# Patient Record
Sex: FEMALE | Race: WHITE | Employment: OTHER | URBAN - NONMETROPOLITAN AREA
[De-identification: names, ages, dates, MRNs, and addresses within clinical notes are randomized per-mention and may not be internally consistent; named-entity substitution may affect disease eponyms.]

---

## 2017-03-13 ENCOUNTER — OFFICE VISIT (OUTPATIENT)
Age: 82
End: 2017-03-13

## 2017-03-13 VITALS
HEIGHT: 63 IN | WEIGHT: 180 LBS | SYSTOLIC BLOOD PRESSURE: 130 MMHG | RESPIRATION RATE: 16 BRPM | HEART RATE: 87 BPM | OXYGEN SATURATION: 98 % | TEMPERATURE: 97.6 F | BODY MASS INDEX: 31.89 KG/M2 | DIASTOLIC BLOOD PRESSURE: 70 MMHG

## 2017-03-13 DIAGNOSIS — Z98.890 S/P LUMPECTOMY, LEFT BREAST: ICD-10-CM

## 2017-03-13 DIAGNOSIS — C50.212 CARCINOMA OF LEFT BREAST UPPER INNER QUADRANT (HCC): Primary | ICD-10-CM

## 2017-03-13 PROCEDURE — G8417 CALC BMI ABV UP PARAM F/U: HCPCS | Performed by: SURGERY

## 2017-03-13 PROCEDURE — G8427 DOCREV CUR MEDS BY ELIG CLIN: HCPCS | Performed by: SURGERY

## 2017-03-13 PROCEDURE — G8400 PT W/DXA NO RESULTS DOC: HCPCS | Performed by: SURGERY

## 2017-03-13 PROCEDURE — 1090F PRES/ABSN URINE INCON ASSESS: CPT | Performed by: SURGERY

## 2017-03-13 PROCEDURE — 4040F PNEUMOC VAC/ADMIN/RCVD: CPT | Performed by: SURGERY

## 2017-03-13 PROCEDURE — 1036F TOBACCO NON-USER: CPT | Performed by: SURGERY

## 2017-03-13 PROCEDURE — G8484 FLU IMMUNIZE NO ADMIN: HCPCS | Performed by: SURGERY

## 2017-03-13 PROCEDURE — 1123F ACP DISCUSS/DSCN MKR DOCD: CPT | Performed by: SURGERY

## 2017-03-13 PROCEDURE — 99212 OFFICE O/P EST SF 10 MIN: CPT | Performed by: SURGERY

## 2017-03-15 ENCOUNTER — OFFICE VISIT (OUTPATIENT)
Dept: ONCOLOGY | Age: 82
End: 2017-03-15

## 2017-03-15 VITALS
HEART RATE: 79 BPM | DIASTOLIC BLOOD PRESSURE: 75 MMHG | BODY MASS INDEX: 31.54 KG/M2 | SYSTOLIC BLOOD PRESSURE: 164 MMHG | OXYGEN SATURATION: 97 % | WEIGHT: 178 LBS | HEIGHT: 63 IN | TEMPERATURE: 97.1 F | RESPIRATION RATE: 18 BRPM

## 2017-03-15 DIAGNOSIS — C50.212 CARCINOMA OF LEFT BREAST UPPER INNER QUADRANT (HCC): Primary | ICD-10-CM

## 2017-03-15 DIAGNOSIS — Z98.890 S/P LUMPECTOMY, LEFT BREAST: ICD-10-CM

## 2017-03-15 DIAGNOSIS — Z92.3 STATUS POST RADIATION THERAPY WITHIN LAST FOUR WEEKS: ICD-10-CM

## 2017-03-15 PROCEDURE — G8417 CALC BMI ABV UP PARAM F/U: HCPCS | Performed by: INTERNAL MEDICINE

## 2017-03-15 PROCEDURE — 4040F PNEUMOC VAC/ADMIN/RCVD: CPT | Performed by: INTERNAL MEDICINE

## 2017-03-15 PROCEDURE — G8484 FLU IMMUNIZE NO ADMIN: HCPCS | Performed by: INTERNAL MEDICINE

## 2017-03-15 PROCEDURE — G8427 DOCREV CUR MEDS BY ELIG CLIN: HCPCS | Performed by: INTERNAL MEDICINE

## 2017-03-15 PROCEDURE — 99213 OFFICE O/P EST LOW 20 MIN: CPT | Performed by: INTERNAL MEDICINE

## 2017-03-15 PROCEDURE — 1123F ACP DISCUSS/DSCN MKR DOCD: CPT | Performed by: INTERNAL MEDICINE

## 2017-03-15 PROCEDURE — G8400 PT W/DXA NO RESULTS DOC: HCPCS | Performed by: INTERNAL MEDICINE

## 2017-03-15 PROCEDURE — 1036F TOBACCO NON-USER: CPT | Performed by: INTERNAL MEDICINE

## 2017-03-15 PROCEDURE — 1090F PRES/ABSN URINE INCON ASSESS: CPT | Performed by: INTERNAL MEDICINE

## 2017-03-15 ASSESSMENT — ENCOUNTER SYMPTOMS
BLOOD IN STOOL: 0
COUGH: 0
ABDOMINAL PAIN: 0
CONSTIPATION: 0
TROUBLE SWALLOWING: 0
DIARRHEA: 0
EYE REDNESS: 0
RECTAL PAIN: 0
CHEST TIGHTNESS: 0
COLOR CHANGE: 0
EYE ITCHING: 0
SORE THROAT: 0
SHORTNESS OF BREATH: 0
FACIAL SWELLING: 0
BACK PAIN: 0
ABDOMINAL DISTENTION: 0
VOICE CHANGE: 0
VOMITING: 0
WHEEZING: 0
EYE DISCHARGE: 0
NAUSEA: 0

## 2017-07-17 ENCOUNTER — OFFICE VISIT (OUTPATIENT)
Dept: ONCOLOGY | Age: 82
End: 2017-07-17
Payer: MEDICARE

## 2017-07-17 ENCOUNTER — APPOINTMENT (OUTPATIENT)
Dept: INFUSION THERAPY | Age: 82
End: 2017-07-17
Payer: MEDICARE

## 2017-07-17 VITALS
SYSTOLIC BLOOD PRESSURE: 166 MMHG | OXYGEN SATURATION: 96 % | WEIGHT: 179 LBS | TEMPERATURE: 97.2 F | BODY MASS INDEX: 31.71 KG/M2 | HEART RATE: 48 BPM | DIASTOLIC BLOOD PRESSURE: 68 MMHG | RESPIRATION RATE: 18 BRPM | HEIGHT: 63 IN

## 2017-07-17 DIAGNOSIS — Z12.31 ENCOUNTER FOR SCREENING MAMMOGRAM FOR MALIGNANT NEOPLASM OF BREAST: ICD-10-CM

## 2017-07-17 DIAGNOSIS — Z98.890 S/P LUMPECTOMY, LEFT BREAST: ICD-10-CM

## 2017-07-17 DIAGNOSIS — C50.212 CARCINOMA OF LEFT BREAST UPPER INNER QUADRANT (HCC): Primary | ICD-10-CM

## 2017-07-17 PROCEDURE — G8417 CALC BMI ABV UP PARAM F/U: HCPCS | Performed by: INTERNAL MEDICINE

## 2017-07-17 PROCEDURE — 4040F PNEUMOC VAC/ADMIN/RCVD: CPT | Performed by: INTERNAL MEDICINE

## 2017-07-17 PROCEDURE — 1036F TOBACCO NON-USER: CPT | Performed by: INTERNAL MEDICINE

## 2017-07-17 PROCEDURE — G8400 PT W/DXA NO RESULTS DOC: HCPCS | Performed by: INTERNAL MEDICINE

## 2017-07-17 PROCEDURE — 99213 OFFICE O/P EST LOW 20 MIN: CPT | Performed by: INTERNAL MEDICINE

## 2017-07-17 PROCEDURE — 99211 OFF/OP EST MAY X REQ PHY/QHP: CPT

## 2017-07-17 PROCEDURE — G8427 DOCREV CUR MEDS BY ELIG CLIN: HCPCS | Performed by: INTERNAL MEDICINE

## 2017-07-17 PROCEDURE — 1090F PRES/ABSN URINE INCON ASSESS: CPT | Performed by: INTERNAL MEDICINE

## 2017-07-17 PROCEDURE — 1123F ACP DISCUSS/DSCN MKR DOCD: CPT | Performed by: INTERNAL MEDICINE

## 2017-07-17 RX ORDER — TRAMADOL HYDROCHLORIDE 50 MG/1
50 TABLET ORAL EVERY 6 HOURS PRN
COMMUNITY
End: 2018-06-28 | Stop reason: ALTCHOICE

## 2017-07-17 ASSESSMENT — ENCOUNTER SYMPTOMS
ABDOMINAL DISTENTION: 0
BACK PAIN: 0
COUGH: 0
ABDOMINAL PAIN: 0
WHEEZING: 0
COLOR CHANGE: 0
EYE DISCHARGE: 0
RECTAL PAIN: 0
TROUBLE SWALLOWING: 0
EYE ITCHING: 0
SHORTNESS OF BREATH: 0
EYE REDNESS: 0
BLOOD IN STOOL: 0
SORE THROAT: 0
NAUSEA: 0
CHEST TIGHTNESS: 0
VOMITING: 0
VOICE CHANGE: 0
FACIAL SWELLING: 0
CONSTIPATION: 0
DIARRHEA: 0

## 2017-09-07 ENCOUNTER — HOSPITAL ENCOUNTER (OUTPATIENT)
Age: 82
Discharge: HOME | End: 2017-09-07
Payer: MEDICARE

## 2017-09-07 ENCOUNTER — HOSPITAL ENCOUNTER (OUTPATIENT)
Dept: RADIATION ONCOLOGY | Age: 82
Discharge: HOME | End: 2017-09-07
Payer: MEDICARE

## 2017-09-07 DIAGNOSIS — C50.212 CARCINOMA OF LEFT BREAST UPPER INNER QUADRANT (HCC): ICD-10-CM

## 2017-09-07 DIAGNOSIS — R53.81 PHYSICAL DECONDITIONING: ICD-10-CM

## 2017-09-07 DIAGNOSIS — I49.9 IRREGULAR HEART RATE: Primary | ICD-10-CM

## 2017-09-07 PROCEDURE — 99211 OFF/OP EST MAY X REQ PHY/QHP: CPT

## 2017-09-07 PROCEDURE — 93005 ELECTROCARDIOGRAM TRACING: CPT

## 2017-09-08 LAB
EKG ATRIAL RATE: 83 BPM
EKG P AXIS: 79 DEGREES
EKG P-R INTERVAL: 158 MS
EKG Q-T INTERVAL: 402 MS
EKG QRS DURATION: 130 MS
EKG QTC CALCULATION (BAZETT): 472 MS
EKG R AXIS: 35 DEGREES
EKG T AXIS: 72 DEGREES
EKG VENTRICULAR RATE: 83 BPM

## 2017-09-21 ENCOUNTER — HOSPITAL ENCOUNTER (OUTPATIENT)
Dept: PHYSICAL THERAPY | Age: 82
Setting detail: THERAPIES SERIES
Discharge: HOME | End: 2017-09-21
Payer: MEDICARE

## 2017-09-21 PROCEDURE — G8991 OTHER PT/OT GOAL STATUS: HCPCS

## 2017-09-21 PROCEDURE — G8990 OTHER PT/OT CURRENT STATUS: HCPCS

## 2017-09-21 ASSESSMENT — PAIN DESCRIPTION - DESCRIPTORS: DESCRIPTORS: ACHING

## 2017-09-21 ASSESSMENT — PAIN SCALES - GENERAL: PAINLEVEL_OUTOF10: 6

## 2017-09-21 ASSESSMENT — PAIN DESCRIPTION - LOCATION: LOCATION: SHOULDER

## 2017-09-21 ASSESSMENT — PAIN DESCRIPTION - ORIENTATION: ORIENTATION: LEFT;ANTERIOR

## 2017-09-28 ENCOUNTER — APPOINTMENT (OUTPATIENT)
Dept: PHYSICAL THERAPY | Age: 82
End: 2017-09-28
Payer: MEDICARE

## 2017-11-02 ENCOUNTER — HOSPITAL ENCOUNTER (OUTPATIENT)
Dept: PHYSICAL THERAPY | Age: 82
Setting detail: THERAPIES SERIES
Discharge: HOME | End: 2017-11-02
Payer: MEDICARE

## 2017-11-02 NOTE — PROGRESS NOTES
[address] [facility] DISCHARGE NOTE  OUTPATIENT  [address] [facility]    Patient Name: Geoffrey Arce        CSN: [id number]   YOB: 1934  Gender: female          Patient is discharged from Physical Therapy services at this time. See last note for details related to results of therapy and goal achievement. Reason for discharge:  She has only been seen for eval and chart has been on hold for over 30 days. Will discharge at this time.   New order needed to continue      Catalina Coker [id number]

## 2017-11-15 ENCOUNTER — HOSPITAL ENCOUNTER (OUTPATIENT)
Dept: WOMENS IMAGING | Age: 82
Discharge: HOME | End: 2017-11-15
Payer: MEDICARE

## 2017-11-15 DIAGNOSIS — C50.212 CARCINOMA OF LEFT BREAST UPPER INNER QUADRANT (HCC): ICD-10-CM

## 2017-11-15 DIAGNOSIS — Z98.890 S/P LUMPECTOMY, LEFT BREAST: ICD-10-CM

## 2017-11-15 PROCEDURE — G0279 TOMOSYNTHESIS, MAMMO: HCPCS

## 2017-11-30 ENCOUNTER — OFFICE VISIT (OUTPATIENT)
Dept: ONCOLOGY | Age: 82
End: 2017-11-30
Payer: MEDICARE

## 2017-11-30 ENCOUNTER — HOSPITAL ENCOUNTER (OUTPATIENT)
Dept: INFUSION THERAPY | Age: 82
Discharge: HOME | End: 2017-11-30
Payer: MEDICARE

## 2017-11-30 VITALS
HEART RATE: 76 BPM | SYSTOLIC BLOOD PRESSURE: 145 MMHG | DIASTOLIC BLOOD PRESSURE: 74 MMHG | HEIGHT: 63 IN | OXYGEN SATURATION: 96 % | RESPIRATION RATE: 16 BRPM | BODY MASS INDEX: 32.67 KG/M2 | TEMPERATURE: 97 F | WEIGHT: 184.4 LBS

## 2017-11-30 DIAGNOSIS — Z98.890 S/P LUMPECTOMY, LEFT BREAST: ICD-10-CM

## 2017-11-30 DIAGNOSIS — C50.212 CARCINOMA OF UPPER-INNER QUADRANT OF LEFT BREAST IN FEMALE, ESTROGEN RECEPTOR NEGATIVE (HCC): Primary | ICD-10-CM

## 2017-11-30 DIAGNOSIS — Z92.3 STATUS POST RADIATION THERAPY WITHIN LAST FOUR WEEKS: ICD-10-CM

## 2017-11-30 DIAGNOSIS — Z17.1 CARCINOMA OF UPPER-INNER QUADRANT OF LEFT BREAST IN FEMALE, ESTROGEN RECEPTOR NEGATIVE (HCC): Primary | ICD-10-CM

## 2017-11-30 PROCEDURE — 1090F PRES/ABSN URINE INCON ASSESS: CPT | Performed by: INTERNAL MEDICINE

## 2017-11-30 PROCEDURE — 1123F ACP DISCUSS/DSCN MKR DOCD: CPT | Performed by: INTERNAL MEDICINE

## 2017-11-30 PROCEDURE — 4040F PNEUMOC VAC/ADMIN/RCVD: CPT | Performed by: INTERNAL MEDICINE

## 2017-11-30 PROCEDURE — G8417 CALC BMI ABV UP PARAM F/U: HCPCS | Performed by: INTERNAL MEDICINE

## 2017-11-30 PROCEDURE — G8484 FLU IMMUNIZE NO ADMIN: HCPCS | Performed by: INTERNAL MEDICINE

## 2017-11-30 PROCEDURE — G8400 PT W/DXA NO RESULTS DOC: HCPCS | Performed by: INTERNAL MEDICINE

## 2017-11-30 PROCEDURE — 1036F TOBACCO NON-USER: CPT | Performed by: INTERNAL MEDICINE

## 2017-11-30 PROCEDURE — 99214 OFFICE O/P EST MOD 30 MIN: CPT | Performed by: INTERNAL MEDICINE

## 2017-11-30 PROCEDURE — G8427 DOCREV CUR MEDS BY ELIG CLIN: HCPCS | Performed by: INTERNAL MEDICINE

## 2017-11-30 PROCEDURE — 99211 OFF/OP EST MAY X REQ PHY/QHP: CPT

## 2017-11-30 RX ORDER — DESONIDE 0.5 MG/G
CREAM TOPICAL 2 TIMES DAILY
COMMUNITY

## 2017-11-30 RX ORDER — ACETAMINOPHEN 325 MG/1
650 TABLET ORAL PRN
COMMUNITY

## 2017-11-30 RX ORDER — METOPROLOL SUCCINATE 25 MG/1
25 TABLET, EXTENDED RELEASE ORAL DAILY
COMMUNITY
Start: 2017-10-23

## 2017-11-30 ASSESSMENT — ENCOUNTER SYMPTOMS
DIARRHEA: 0
CHEST TIGHTNESS: 0
SHORTNESS OF BREATH: 0
EYE REDNESS: 0
EYE DISCHARGE: 0
ABDOMINAL PAIN: 0
VOMITING: 0
BACK PAIN: 0
FACIAL SWELLING: 0
COLOR CHANGE: 0
CONSTIPATION: 0
VOICE CHANGE: 0
RECTAL PAIN: 0
NAUSEA: 0
WHEEZING: 0
BLOOD IN STOOL: 0
TROUBLE SWALLOWING: 0
COUGH: 0
ABDOMINAL DISTENTION: 0
EYE ITCHING: 0
SORE THROAT: 0

## 2017-11-30 NOTE — PROGRESS NOTES
Negative for color change, rash and wound. Neurological: Negative for dizziness, tremors, seizures, syncope, speech difficulty, weakness, light-headedness, numbness and headaches. Hematological: Negative for adenopathy. Does not bruise/bleed easily. Psychiatric/Behavioral: Negative for confusion and sleep disturbance. The patient is not nervous/anxious. Objective:   Physical Exam   Constitutional: She is oriented to person, place, and time. She appears well-developed and well-nourished. No distress. HENT:   Head: Normocephalic. Mouth/Throat: Oropharynx is clear and moist. No oropharyngeal exudate. Eyes: EOM are normal. Pupils are equal, round, and reactive to light. Right eye exhibits no discharge. Left eye exhibits no discharge. No scleral icterus. Neck: Normal range of motion. Neck supple. No JVD present. No tracheal deviation present. No thyromegaly present. Cardiovascular: Normal rate and normal heart sounds. Exam reveals no gallop and no friction rub. No murmur heard. Pulmonary/Chest: Effort normal and breath sounds normal. No stridor. No respiratory distress. She has no wheezes. She has no rales. She exhibits no tenderness. Left breast exhibits skin change (status post lumpectomy, incison well healed. ). Abdominal: Soft. Bowel sounds are normal. She exhibits no distension and no mass. There is no tenderness. There is no rebound. Musculoskeletal: Normal range of motion. She exhibits no edema. Good range of motion in all four extremities. Lymphadenopathy:     She has no cervical adenopathy. Neurological: She is alert and oriented to person, place, and time. She has normal reflexes. No cranial nerve deficit. She exhibits normal muscle tone. Skin: Skin is warm. No rash noted. No erythema. Psychiatric: She has a normal mood and affect. Her behavior is normal. Judgment and thought content normal.   Vitals reviewed.      BP (!) 145/74 (Site: Left Arm, Position: Sitting, Cuff

## 2018-03-07 ENCOUNTER — HOSPITAL ENCOUNTER (OUTPATIENT)
Dept: RADIATION ONCOLOGY | Age: 83
Discharge: HOME | End: 2018-03-07
Payer: MEDICARE

## 2018-03-07 PROCEDURE — 99211 OFF/OP EST MAY X REQ PHY/QHP: CPT

## 2018-06-28 ENCOUNTER — OFFICE VISIT (OUTPATIENT)
Dept: ONCOLOGY | Age: 83
End: 2018-06-28
Payer: MEDICARE

## 2018-06-28 ENCOUNTER — HOSPITAL ENCOUNTER (OUTPATIENT)
Dept: INFUSION THERAPY | Age: 83
Discharge: HOME | End: 2018-06-28
Payer: MEDICARE

## 2018-06-28 VITALS
OXYGEN SATURATION: 96 % | SYSTOLIC BLOOD PRESSURE: 157 MMHG | RESPIRATION RATE: 18 BRPM | HEIGHT: 63 IN | WEIGHT: 182.2 LBS | TEMPERATURE: 97.6 F | DIASTOLIC BLOOD PRESSURE: 70 MMHG | BODY MASS INDEX: 32.28 KG/M2 | HEART RATE: 87 BPM

## 2018-06-28 DIAGNOSIS — C50.212 CARCINOMA OF UPPER-INNER QUADRANT OF LEFT BREAST IN FEMALE, ESTROGEN RECEPTOR NEGATIVE (HCC): Primary | ICD-10-CM

## 2018-06-28 DIAGNOSIS — Z98.890 S/P LUMPECTOMY, LEFT BREAST: ICD-10-CM

## 2018-06-28 DIAGNOSIS — Z17.1 CARCINOMA OF UPPER-INNER QUADRANT OF LEFT BREAST IN FEMALE, ESTROGEN RECEPTOR NEGATIVE (HCC): Primary | ICD-10-CM

## 2018-06-28 PROCEDURE — G8427 DOCREV CUR MEDS BY ELIG CLIN: HCPCS | Performed by: INTERNAL MEDICINE

## 2018-06-28 PROCEDURE — 99211 OFF/OP EST MAY X REQ PHY/QHP: CPT

## 2018-06-28 PROCEDURE — 1036F TOBACCO NON-USER: CPT | Performed by: INTERNAL MEDICINE

## 2018-06-28 PROCEDURE — G8417 CALC BMI ABV UP PARAM F/U: HCPCS | Performed by: INTERNAL MEDICINE

## 2018-06-28 PROCEDURE — G8400 PT W/DXA NO RESULTS DOC: HCPCS | Performed by: INTERNAL MEDICINE

## 2018-06-28 PROCEDURE — 1090F PRES/ABSN URINE INCON ASSESS: CPT | Performed by: INTERNAL MEDICINE

## 2018-06-28 PROCEDURE — 1123F ACP DISCUSS/DSCN MKR DOCD: CPT | Performed by: INTERNAL MEDICINE

## 2018-06-28 PROCEDURE — 99213 OFFICE O/P EST LOW 20 MIN: CPT | Performed by: INTERNAL MEDICINE

## 2018-06-28 PROCEDURE — 4040F PNEUMOC VAC/ADMIN/RCVD: CPT | Performed by: INTERNAL MEDICINE

## 2018-06-28 ASSESSMENT — ENCOUNTER SYMPTOMS
RECTAL PAIN: 0
TROUBLE SWALLOWING: 0
EYE REDNESS: 0
SHORTNESS OF BREATH: 0
NAUSEA: 0
ABDOMINAL PAIN: 0
COLOR CHANGE: 0
VOMITING: 0
VOICE CHANGE: 0
SORE THROAT: 0
BACK PAIN: 0
BLOOD IN STOOL: 0
FACIAL SWELLING: 0
WHEEZING: 0
ABDOMINAL DISTENTION: 0
CHEST TIGHTNESS: 0
EYE DISCHARGE: 0
EYE ITCHING: 0
DIARRHEA: 0
CONSTIPATION: 0
COUGH: 0

## 2018-09-17 ENCOUNTER — HOSPITAL ENCOUNTER (OUTPATIENT)
Dept: RADIATION ONCOLOGY | Age: 83
Discharge: HOME | End: 2018-09-17
Payer: MEDICARE

## 2018-09-17 PROCEDURE — 99211 OFF/OP EST MAY X REQ PHY/QHP: CPT

## 2018-09-17 NOTE — PROGRESS NOTES
[address] [place] [address] 47, [address]  Phone: [phone]   Toll Free: [phone]   Fax: [phone]    RADIATION ONCOLOGY FOLLOW UP REPORT    PATIENT NAME:  Heladio Robertson     : 1934  MEDICAL RECORD NO: [id number]    LOCATION: [facility] NO: [id number]      PROVIDER: JAYSON Flores CNP        DATE OF SERVICE: 2018    FOLLOW UP PHYSICIANS: Dr. Francesca Jama, Dr. Isai Choe, Dr. 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  10/24/2016 ultrasound was performed of her left breast which confirm the abnormality.  2016 a biopsy was completed of the suspicious area.  The biopsy demonstrated the diagnosis listed above.  2016 she underwent lumpectomy and sentinel lymph node biopsy.  She had a positive margin which was reexcised on 2016.  She declined systemic chemotherapy despite the recommendation from the medical oncologist. Norma Shaffer did however agreed to undergo radiation therapy.  During the last week of treatment she developed superficial/dry desquamation along the inframammary fold she had moderate erythema in the axilla, nipple areola and in the upper inner quadrant tumor bed boost area.  Her skin reaction responded well to topical management and she did not have any unexpected side effects. INTERVAL HISTORY: Kingsley Morgan returns to [facility] today for a posttreatment checkup as part of her survivorship care after undergoing surgery and radiation therapy for breast cancer.  She denies any complaints specific to her radiation at this time. States she has been feeling well. She denies chest pain, sob, nausea, vomiting, constipation, diarrhea, pain, left breast or arm swelling or decreased ROM to LUE. She continues to use a cane for ambulation. She has triple negative disease and does not require antiestrogen treatment. TESTS: None completed since last visit. MEDICATIONS:   Current Outpatient Prescriptions   Medication Sig Dispense Refill    metoprolol succinate (TOPROL XL) 25 MG extended release tablet Take 25 mg by mouth daily       aspirin 81 MG tablet Take 81 mg by mouth daily      desonide (DESOWEN) 0.05 % cream Apply topically 2 times daily Apply topically 2 times daily.  acetaminophen (TYLENOL) 325 MG tablet Take 650 mg by mouth nightly      ramipril (ALTACE) 5 MG capsule Take 2.5 mg by mouth daily       venlafaxine (EFFEXOR) 37.5 MG tablet Take 75 mg by mouth daily       Multiple Vitamins-Minerals (MULTIVITAMIN PO) Take by mouth daily      Multiple Vitamins-Minerals (PRESERVISION AREDS PO) Take 1 capsule by mouth 2 times daily      docusate sodium (COLACE) 100 MG capsule Take 100 mg by mouth 2 times daily      metFORMIN (GLUCOPHAGE) 500 MG tablet Take 500 mg by mouth 2 times daily (with meals)      levothyroxine (SYNTHROID) 125 MCG tablet Take 88 mcg by mouth Daily       amitriptyline (ELAVIL) 10 MG tablet Take 25 mg by mouth nightly       pravastatin (PRAVACHOL) 20 MG tablet Take 20 mg by mouth daily      doxycycline hyclate (PERIOSTAT) 20 MG tablet Take 50 mg by mouth daily       calcium carbonate 600 MG TABS tablet Take 1 tablet by mouth daily           ROS: As noted in the HPI and Interval history, otherwise negative. EXAMINATION:   GENERAL: Nadine Carrion is a pleasant well-developed developed adult female she is alert and oriented ×3 in no acute distress  VITAL SIGNS:  Weight 81.6 kg, temperature 35.7°C, pulse 80, blood pressure 136/69, respirations 20, pulse ox 98% on room air.   HEENT: Normocephalic, atraumatic. PERRL. EOMI. ears nose and lips are within normal limits.  Oropharynx is unremarkable. Skin to cheeks are sue in color due to rosacea, this has greatly improved from last visit. NECK: Without palpable  adenopathy. Tender pink skin from rosacea (improved from last visit)  LYMPH: Without palpable supraclavicular, or axillary adenopathy  LUNGS: Clear no adventitious sounds, respiratory rate easy and unlabored. HEART: Regular rate and rhythm without murmur. BREASTS: Right breast is soft, nontender.  There are no suspicious masses, lumps or architectural distortions.  Nipple is everted without discharge.  Left breast is soft, nontender. There is postsurgical architectural distortion.  Surgical incision site is well-healed without signs of infection.  There are no suspicious masses or lesions.  Nipple is everted without discharge  ABDOMEN: Soft, nontender.  Active bowel sounds ×4. EXTREMITIES:  Without clubbing, or cyanosis.  No evidence of extremity lymphedema. NEUROLOGIC EXAMINATION: Cranial nerves grossly intact  MUSCULOSKELETAL: Has full range of motion of bilateral lower extremities. Good ROM to bilateral upper extremities.         ASSESSMENT AND PLAN: Richard Sanderson continues to be doing well after completing radiation therapy.  At this time she does not have any physical or mammographic signs of recurrent or persistent disease.       Plan:  1.  Follow up with her in 6 months time, offsetting appointment with medical oncology. 2.  Mammogram due November 2018: scheduled to have completed November 6th. 3.  Continue follow-up all other physicians as scheduled  4.  Rosacea: [address] PCP. Has greatly improved since last visit. Skin in affected areas remain tender and sore with palpation.   5.  Receptor negative breast cancer: Does not require anti-hormonal treatment        Electronically signed by JAYSON Smith CNP on 9/17/18 at 1:56 PM

## 2018-11-06 ENCOUNTER — HOSPITAL ENCOUNTER (OUTPATIENT)
Dept: WOMENS IMAGING | Age: 83
Discharge: HOME | End: 2018-11-06
Payer: MEDICARE

## 2018-11-06 DIAGNOSIS — Z17.1 CARCINOMA OF UPPER-INNER QUADRANT OF LEFT BREAST IN FEMALE, ESTROGEN RECEPTOR NEGATIVE (HCC): ICD-10-CM

## 2018-11-06 DIAGNOSIS — C50.212 CARCINOMA OF UPPER-INNER QUADRANT OF LEFT BREAST IN FEMALE, ESTROGEN RECEPTOR NEGATIVE (HCC): ICD-10-CM

## 2018-11-06 PROCEDURE — 77066 DX MAMMO INCL CAD BI: CPT

## 2019-01-07 ENCOUNTER — OFFICE VISIT (OUTPATIENT)
Dept: ONCOLOGY | Age: 84
End: 2019-01-07
Payer: MEDICARE

## 2019-01-07 ENCOUNTER — HOSPITAL ENCOUNTER (OUTPATIENT)
Dept: INFUSION THERAPY | Age: 84
Discharge: HOME | End: 2019-01-07
Payer: MEDICARE

## 2019-01-07 VITALS
HEIGHT: 63 IN | DIASTOLIC BLOOD PRESSURE: 67 MMHG | WEIGHT: 175.4 LBS | BODY MASS INDEX: 31.08 KG/M2 | RESPIRATION RATE: 16 BRPM | OXYGEN SATURATION: 94 % | TEMPERATURE: 98.1 F | SYSTOLIC BLOOD PRESSURE: 149 MMHG | HEART RATE: 82 BPM

## 2019-01-07 DIAGNOSIS — Z98.890 S/P LUMPECTOMY, LEFT BREAST: ICD-10-CM

## 2019-01-07 DIAGNOSIS — Z17.1 CARCINOMA OF UPPER-INNER QUADRANT OF LEFT BREAST IN FEMALE, ESTROGEN RECEPTOR NEGATIVE (HCC): Primary | ICD-10-CM

## 2019-01-07 DIAGNOSIS — Z92.3 STATUS POST RADIATION THERAPY WITHIN LAST FOUR WEEKS: ICD-10-CM

## 2019-01-07 DIAGNOSIS — C50.212 CARCINOMA OF UPPER-INNER QUADRANT OF LEFT BREAST IN FEMALE, ESTROGEN RECEPTOR NEGATIVE (HCC): Primary | ICD-10-CM

## 2019-01-07 PROCEDURE — 1090F PRES/ABSN URINE INCON ASSESS: CPT | Performed by: INTERNAL MEDICINE

## 2019-01-07 PROCEDURE — 99213 OFFICE O/P EST LOW 20 MIN: CPT | Performed by: INTERNAL MEDICINE

## 2019-01-07 PROCEDURE — G8484 FLU IMMUNIZE NO ADMIN: HCPCS | Performed by: INTERNAL MEDICINE

## 2019-01-07 PROCEDURE — 99211 OFF/OP EST MAY X REQ PHY/QHP: CPT

## 2019-01-07 PROCEDURE — 4040F PNEUMOC VAC/ADMIN/RCVD: CPT | Performed by: INTERNAL MEDICINE

## 2019-01-07 PROCEDURE — G8400 PT W/DXA NO RESULTS DOC: HCPCS | Performed by: INTERNAL MEDICINE

## 2019-01-07 PROCEDURE — G8417 CALC BMI ABV UP PARAM F/U: HCPCS | Performed by: INTERNAL MEDICINE

## 2019-01-07 PROCEDURE — 1036F TOBACCO NON-USER: CPT | Performed by: INTERNAL MEDICINE

## 2019-01-07 PROCEDURE — G8427 DOCREV CUR MEDS BY ELIG CLIN: HCPCS | Performed by: INTERNAL MEDICINE

## 2019-01-07 PROCEDURE — 1101F PT FALLS ASSESS-DOCD LE1/YR: CPT | Performed by: INTERNAL MEDICINE

## 2019-01-07 PROCEDURE — 1123F ACP DISCUSS/DSCN MKR DOCD: CPT | Performed by: INTERNAL MEDICINE

## 2019-01-07 ASSESSMENT — ENCOUNTER SYMPTOMS
RECTAL PAIN: 0
ABDOMINAL DISTENTION: 0
FACIAL SWELLING: 0
SORE THROAT: 0
DIARRHEA: 0
TROUBLE SWALLOWING: 0
BLOOD IN STOOL: 0
COUGH: 0
EYE REDNESS: 0
SHORTNESS OF BREATH: 0
EYE DISCHARGE: 0
EYE ITCHING: 0
ABDOMINAL PAIN: 0
NAUSEA: 0
VOICE CHANGE: 0
COLOR CHANGE: 0
WHEEZING: 0
CHEST TIGHTNESS: 0
BACK PAIN: 0
VOMITING: 0
CONSTIPATION: 0

## 2019-05-06 ENCOUNTER — HOSPITAL ENCOUNTER (OUTPATIENT)
Dept: RADIATION ONCOLOGY | Age: 84
Discharge: HOME | End: 2019-05-06
Payer: MEDICARE

## 2019-05-06 PROCEDURE — 99211 OFF/OP EST MAY X REQ PHY/QHP: CPT | Performed by: NURSE PRACTITIONER

## 2019-05-06 NOTE — PROGRESS NOTES
[address] [place] [address] 57, [address]  Phone: [phone]   Toll Free: [phone]   Fax: [phone]    RADIATION ONCOLOGY FOLLOW UP REPORT    PATIENT NAME:  Thom Robertson     : 1934  MEDICAL RECORD NO: [id number]    LOCATION: [facility] NO: [id number]      PROVIDER: JAYSON Lang CNP        DATE OF SERVICE: 2019    FOLLOW UP PHYSICIANS: Dr. Minh Miranda, Dr. Dillon Sánchez, Dr. 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  10/24/2016 ultrasound was performed of her left breast which confirm the abnormality.  2016 a biopsy was completed of the suspicious area.  The biopsy demonstrated the diagnosis listed above.  2016 she underwent lumpectomy and sentinel lymph node biopsy.  She had a positive margin which was reexcised on 2016.  She declined systemic chemotherapy despite the recommendation from the medical oncologist. Alan Reed did however agreed to undergo radiation therapy.  During the last week of treatment she developed superficial/dry desquamation along the inframammary fold she had moderate erythema in the axilla, nipple areola and in the upper inner quadrant tumor bed boost area.  Her skin reaction responded well to topical management and she did not have any unexpected side effects.      INTERVAL HISTORY: Dean Dunn returns to [facility] today for a posttreatment checkup as part of her survivorship care after undergoing surgery and radiation therapy for breast cancer.  She denies any complaints specific to her [address] PCP. Has greatly improved since last visit. Skin in affected areas remain tender and sore with palpation.   5.  Receptor negative breast cancer: Does not require anti-hormonal treatment         Electronically signed by JAYSON Hodges CNP on 5/6/19 at 2:19 PM

## 2019-07-09 ENCOUNTER — HOSPITAL ENCOUNTER (OUTPATIENT)
Dept: INFUSION THERAPY | Age: 84
Discharge: HOME | End: 2019-07-09
Payer: MEDICARE

## 2019-07-09 ENCOUNTER — OFFICE VISIT (OUTPATIENT)
Dept: ONCOLOGY | Age: 84
End: 2019-07-09
Payer: MEDICARE

## 2019-07-09 VITALS
OXYGEN SATURATION: 93 % | HEART RATE: 72 BPM | WEIGHT: 179.8 LBS | RESPIRATION RATE: 12 BRPM | SYSTOLIC BLOOD PRESSURE: 174 MMHG | BODY MASS INDEX: 31.86 KG/M2 | HEIGHT: 63 IN | DIASTOLIC BLOOD PRESSURE: 72 MMHG | TEMPERATURE: 97.8 F

## 2019-07-09 DIAGNOSIS — Z98.890 S/P LUMPECTOMY, LEFT BREAST: ICD-10-CM

## 2019-07-09 DIAGNOSIS — C50.212 CARCINOMA OF UPPER-INNER QUADRANT OF LEFT BREAST IN FEMALE, ESTROGEN RECEPTOR NEGATIVE (HCC): Primary | ICD-10-CM

## 2019-07-09 DIAGNOSIS — Z08 ENCOUNTER FOR FOLLOW-UP SURVEILLANCE OF BREAST CANCER: ICD-10-CM

## 2019-07-09 DIAGNOSIS — Z85.3 ENCOUNTER FOR FOLLOW-UP SURVEILLANCE OF BREAST CANCER: ICD-10-CM

## 2019-07-09 DIAGNOSIS — Z17.1 CARCINOMA OF UPPER-INNER QUADRANT OF LEFT BREAST IN FEMALE, ESTROGEN RECEPTOR NEGATIVE (HCC): Primary | ICD-10-CM

## 2019-07-09 PROCEDURE — 1123F ACP DISCUSS/DSCN MKR DOCD: CPT | Performed by: INTERNAL MEDICINE

## 2019-07-09 PROCEDURE — 99211 OFF/OP EST MAY X REQ PHY/QHP: CPT

## 2019-07-09 PROCEDURE — G8400 PT W/DXA NO RESULTS DOC: HCPCS | Performed by: INTERNAL MEDICINE

## 2019-07-09 PROCEDURE — G8417 CALC BMI ABV UP PARAM F/U: HCPCS | Performed by: INTERNAL MEDICINE

## 2019-07-09 PROCEDURE — 1036F TOBACCO NON-USER: CPT | Performed by: INTERNAL MEDICINE

## 2019-07-09 PROCEDURE — 99213 OFFICE O/P EST LOW 20 MIN: CPT | Performed by: INTERNAL MEDICINE

## 2019-07-09 PROCEDURE — G8427 DOCREV CUR MEDS BY ELIG CLIN: HCPCS | Performed by: INTERNAL MEDICINE

## 2019-07-09 PROCEDURE — 1090F PRES/ABSN URINE INCON ASSESS: CPT | Performed by: INTERNAL MEDICINE

## 2019-07-09 PROCEDURE — 4040F PNEUMOC VAC/ADMIN/RCVD: CPT | Performed by: INTERNAL MEDICINE

## 2019-07-09 ASSESSMENT — ENCOUNTER SYMPTOMS
VOICE CHANGE: 0
CONSTIPATION: 0
EYE REDNESS: 0
COUGH: 0
EYE ITCHING: 0
SORE THROAT: 0
ABDOMINAL DISTENTION: 0
CHEST TIGHTNESS: 0
FACIAL SWELLING: 0
TROUBLE SWALLOWING: 0
DIARRHEA: 0
SHORTNESS OF BREATH: 0
VOMITING: 0
ABDOMINAL PAIN: 0
BACK PAIN: 0
BLOOD IN STOOL: 0
WHEEZING: 0
EYE DISCHARGE: 0
COLOR CHANGE: 0
NAUSEA: 0
RECTAL PAIN: 0

## 2019-07-09 NOTE — PROGRESS NOTES
[facility] PROFESSIONAL SERVS  [facility]  [facility] Count includes the [facility] 57  [address] [address]  [address] [address]  Dept: [phone]  Dept Fax: [phone]: [phone]     Visit Date: 7/9/2019     Florence Loya is a 80 y.o. female who presents today for:   Chief Complaint   Patient presents with    Follow-up     Left Breast Cancer       6 months follow-up visit    HPI   Mecry Haskins is an 80-year-old female with h/o stage IC triple negative breast cancer. She had a screening mammogram in October 2016, it showed 1.4 cm irregular mass in the upper inner aspect of the left breast.  Subsequently she had a diagnostic mammogram and left breast ultrasound on October 24, 2016. They  showed 1.6 x 1 x 1.4 cm irregular solid mass in the left breast highly suspicious for malignancy. On November 1, 2016 she underwent left breast, upper inner quadrant core needle biopsies. Pathology report showed:   Invasive, poorly differentiated ductal carcinoma, Theresa score III. Estrogen Receptor:  (X) Negative ( < 1% of cells staining)  Progesterone Receptor: (X) Negative ( < 1% of cells staining)  Ki-67 (clone 30-9)  Percentage of positive nuclei:  40%               Inform HER-2 Dual KARL (Vignesh )  (X)  Nonamplified - HER2/CEP17 ratio  < 2.0 AND average HER2         copy number  < 4.0 signals/cell     She discussed her treatment options during her surgical consultation and decided to proceed with  lumpectomy and sentinel lymph node biopsy. She underwent surgery on November 17, 2016. Final pathology report showed: Jessica Naz Lymph node, left axilla, resection:   Negative for neoplasm in 1 out of 1 lymph node. B. Left breast, lumpectomy:   Invasive, poorly differentiated ductal carcinoma, Theresa score 3.   Ductal carcinoma in situ, comedo type, high grade.   Maximum tumor size = 1.8 cm.   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   Since she had a positive margin for DCIS, on November 29, 2016 she had (MULTIVITAMIN PO) Take by mouth daily      Multiple Vitamins-Minerals (PRESERVISION AREDS PO) Take 1 capsule by mouth 2 times daily      metFORMIN (GLUCOPHAGE) 500 MG tablet Take 500 mg by mouth 2 times daily (with meals)      levothyroxine (SYNTHROID) 125 MCG tablet Take 88 mcg by mouth Daily       amitriptyline (ELAVIL) 10 MG tablet Take 25 mg by mouth nightly       pravastatin (PRAVACHOL) 20 MG tablet Take 20 mg by mouth daily      doxycycline hyclate (PERIOSTAT) 20 MG tablet Take 50 mg by mouth daily       calcium carbonate 600 MG TABS tablet Take 1 tablet by mouth daily      docusate sodium (COLACE) 100 MG capsule Take 100 mg by mouth 2 times daily       No current facility-administered medications for this visit. No Known Allergies   Health Maintenance   Topic Date Due    DTaP/Tdap/Td vaccine (1 - Tdap) 09/06/1953    Shingles Vaccine (1 of 2) 09/06/1984    Annual Wellness Visit (AWV)  09/06/1997    DEXA (modify frequency per FRAX score)  09/06/1999    Pneumococcal 65+ years Vaccine (2 of 2 - PPSV23) 10/01/2017    TSH testing  07/12/2019    Potassium monitoring  07/12/2019    Creatinine monitoring  07/12/2019    Flu vaccine (1) 09/01/2019        Subjective:   Review of Systems   Constitutional: Positive for fatigue. Negative for activity change, appetite change, chills, fever and unexpected weight change. HENT: Negative for congestion, dental problem, facial swelling, hearing loss, mouth sores, nosebleeds, postnasal drip, sore throat, tinnitus, trouble swallowing and voice change. Eyes: Negative for discharge, redness, itching and visual disturbance. Respiratory: Negative for cough, chest tightness, shortness of breath and wheezing. Cardiovascular: Negative for chest pain, palpitations and leg swelling. Gastrointestinal: Negative for abdominal distention, abdominal pain, blood in stool, constipation, diarrhea, nausea, rectal pain and vomiting.    Endocrine: Negative for cold signs/symptoms suspicious for disease recurrence. I will see her in 6 months      Return in about 6 months (around 1/9/2020).

## 2019-10-10 ENCOUNTER — HOSPITAL ENCOUNTER (OUTPATIENT)
Dept: RADIATION ONCOLOGY | Age: 84
Discharge: HOME | End: 2019-10-10
Payer: MEDICARE

## 2019-10-10 VITALS
WEIGHT: 176.59 LBS | SYSTOLIC BLOOD PRESSURE: 166 MMHG | DIASTOLIC BLOOD PRESSURE: 76 MMHG | HEART RATE: 78 BPM | OXYGEN SATURATION: 97 % | RESPIRATION RATE: 16 BRPM | BODY MASS INDEX: 31.29 KG/M2 | TEMPERATURE: 98.4 F

## 2019-10-10 PROCEDURE — 99212 OFFICE O/P EST SF 10 MIN: CPT | Performed by: NURSE PRACTITIONER

## 2019-12-05 ENCOUNTER — TELEPHONE (OUTPATIENT)
Dept: ONCOLOGY | Age: 84
End: 2019-12-05

## 2019-12-05 DIAGNOSIS — C50.212 CARCINOMA OF UPPER-INNER QUADRANT OF LEFT BREAST IN FEMALE, ESTROGEN RECEPTOR NEGATIVE (HCC): Primary | ICD-10-CM

## 2019-12-05 DIAGNOSIS — Z17.1 CARCINOMA OF UPPER-INNER QUADRANT OF LEFT BREAST IN FEMALE, ESTROGEN RECEPTOR NEGATIVE (HCC): Primary | ICD-10-CM

## 2019-12-05 DIAGNOSIS — Z12.31 SCREENING MAMMOGRAM FOR HIGH-RISK PATIENT: ICD-10-CM

## 2019-12-09 ENCOUNTER — HOSPITAL ENCOUNTER (OUTPATIENT)
Dept: WOMENS IMAGING | Age: 84
Discharge: HOME | End: 2019-12-09
Payer: MEDICARE

## 2019-12-09 DIAGNOSIS — Z12.31 SCREENING MAMMOGRAM FOR HIGH-RISK PATIENT: ICD-10-CM

## 2019-12-09 PROCEDURE — 77063 BREAST TOMOSYNTHESIS BI: CPT

## 2020-01-03 ENCOUNTER — APPOINTMENT (OUTPATIENT)
Dept: GENERAL RADIOLOGY | Age: 85
DRG: 478 | End: 2020-01-03
Payer: MEDICARE

## 2020-01-03 ENCOUNTER — APPOINTMENT (OUTPATIENT)
Dept: CT IMAGING | Age: 85
DRG: 478 | End: 2020-01-03
Payer: MEDICARE

## 2020-01-03 ENCOUNTER — HOSPITAL ENCOUNTER (INPATIENT)
Age: 85
LOS: 7 days | Discharge: NURSING, REHAB OR LONG-TERM CARE | DRG: 478 | End: 2020-01-10
Attending: EMERGENCY MEDICINE | Admitting: FAMILY MEDICINE
Payer: MEDICARE

## 2020-01-03 ENCOUNTER — APPOINTMENT (OUTPATIENT)
Dept: MRI IMAGING | Age: 85
DRG: 478 | End: 2020-01-03
Payer: MEDICARE

## 2020-01-03 PROBLEM — R52 INTRACTABLE PAIN: Status: ACTIVE | Noted: 2020-01-03

## 2020-01-03 LAB
ALBUMIN SERPL-MCNC: 3.8 G/DL (ref 3.5–5.1)
ALP BLD-CCNC: 171 U/L (ref 38–126)
ALT SERPL-CCNC: 19 U/L (ref 11–66)
ANION GAP SERPL CALCULATED.3IONS-SCNC: 13 MEQ/L (ref 8–16)
AST SERPL-CCNC: 16 U/L (ref 5–40)
AVERAGE GLUCOSE: 246 MG/DL (ref 70–126)
BACTERIA: ABNORMAL
BASOPHILS # BLD: 0.9 %
BASOPHILS ABSOLUTE: 0.1 THOU/MM3 (ref 0–0.1)
BILIRUB SERPL-MCNC: 0.3 MG/DL (ref 0.3–1.2)
BILIRUBIN URINE: NEGATIVE
BLOOD, URINE: NEGATIVE
BUN BLDV-MCNC: 15 MG/DL (ref 7–22)
CALCIUM SERPL-MCNC: 9.7 MG/DL (ref 8.5–10.5)
CASTS: ABNORMAL /LPF
CASTS: ABNORMAL /LPF
CHARACTER, URINE: CLEAR
CHLORIDE BLD-SCNC: 97 MEQ/L (ref 98–111)
CO2: 26 MEQ/L (ref 23–33)
COLOR: ABNORMAL
CREAT SERPL-MCNC: 0.8 MG/DL (ref 0.4–1.2)
CRYSTALS: ABNORMAL
EOSINOPHIL # BLD: 2.2 %
EOSINOPHILS ABSOLUTE: 0.2 THOU/MM3 (ref 0–0.4)
EPITHELIAL CELLS, UA: ABNORMAL /HPF
ERYTHROCYTE [DISTWIDTH] IN BLOOD BY AUTOMATED COUNT: 12.6 % (ref 11.5–14.5)
ERYTHROCYTE [DISTWIDTH] IN BLOOD BY AUTOMATED COUNT: 40.9 FL (ref 35–45)
GAMMA GLUTAMYL TRANSFERASE: 17 U/L (ref 8–69)
GFR SERPL CREATININE-BSD FRML MDRD: 68 ML/MIN/1.73M2
GLUCOSE BLD-MCNC: 314 MG/DL (ref 70–108)
GLUCOSE BLD-MCNC: 323 MG/DL (ref 70–108)
GLUCOSE BLD-MCNC: 409 MG/DL (ref 70–108)
GLUCOSE, URINE: >= 1000 MG/DL
HBA1C MFR BLD: 10.2 % (ref 4.4–6.4)
HCT VFR BLD CALC: 39.6 % (ref 37–47)
HEMOGLOBIN: 13.2 GM/DL (ref 12–16)
IMMATURE GRANS (ABS): 0.03 THOU/MM3 (ref 0–0.07)
IMMATURE GRANULOCYTES: 0.4 %
KETONES, URINE: ABNORMAL
LEUKOCYTE EST, POC: NEGATIVE
LYMPHOCYTES # BLD: 18.4 %
LYMPHOCYTES ABSOLUTE: 1.4 THOU/MM3 (ref 1–4.8)
MCH RBC QN AUTO: 29.7 PG (ref 26–33)
MCHC RBC AUTO-ENTMCNC: 33.3 GM/DL (ref 32.2–35.5)
MCV RBC AUTO: 89.2 FL (ref 81–99)
MISCELLANEOUS LAB TEST RESULT: ABNORMAL
MONOCYTES # BLD: 9.2 %
MONOCYTES ABSOLUTE: 0.7 THOU/MM3 (ref 0.4–1.3)
NITRITE, URINE: NEGATIVE
NUCLEATED RED BLOOD CELLS: 0 /100 WBC
OSMOLALITY CALCULATION: 284.8 MOSMOL/KG (ref 275–300)
PH UA: 5 (ref 5–9)
PLATELET # BLD: 369 THOU/MM3 (ref 130–400)
PMV BLD AUTO: 9 FL (ref 9.4–12.4)
POTASSIUM REFLEX MAGNESIUM: 4.1 MEQ/L (ref 3.5–5.2)
PROTEIN UA: 30 MG/DL
PTH INTACT: 53.4 PG/ML (ref 15–65)
RBC # BLD: 4.44 MILL/MM3 (ref 4.2–5.4)
RBC URINE: ABNORMAL /HPF
RENAL EPITHELIAL, UA: ABNORMAL
SEG NEUTROPHILS: 68.9 %
SEGMENTED NEUTROPHILS ABSOLUTE COUNT: 5.4 THOU/MM3 (ref 1.8–7.7)
SODIUM BLD-SCNC: 136 MEQ/L (ref 135–145)
SPECIFIC GRAVITY UA: > 1.03 (ref 1–1.03)
TOTAL CK: 148 U/L (ref 30–135)
TOTAL PROTEIN: 7.4 G/DL (ref 6.1–8)
UROBILINOGEN, URINE: 1 EU/DL (ref 0–1)
VITAMIN D 25-HYDROXY: 24 NG/ML (ref 30–100)
WBC # BLD: 7.8 THOU/MM3 (ref 4.8–10.8)
WBC UA: ABNORMAL /HPF
YEAST: ABNORMAL

## 2020-01-03 PROCEDURE — 70450 CT HEAD/BRAIN W/O DYE: CPT

## 2020-01-03 PROCEDURE — 99222 1ST HOSP IP/OBS MODERATE 55: CPT | Performed by: NURSE PRACTITIONER

## 2020-01-03 PROCEDURE — 6360000004 HC RX CONTRAST MEDICATION: Performed by: EMERGENCY MEDICINE

## 2020-01-03 PROCEDURE — 85025 COMPLETE CBC W/AUTO DIFF WBC: CPT

## 2020-01-03 PROCEDURE — 82948 REAGENT STRIP/BLOOD GLUCOSE: CPT

## 2020-01-03 PROCEDURE — 0BBC3ZX EXCISION OF RIGHT UPPER LUNG LOBE, PERCUTANEOUS APPROACH, DIAGNOSTIC: ICD-10-PCS | Performed by: RADIOLOGY

## 2020-01-03 PROCEDURE — 76376 3D RENDER W/INTRP POSTPROCES: CPT

## 2020-01-03 PROCEDURE — 72125 CT NECK SPINE W/O DYE: CPT

## 2020-01-03 PROCEDURE — 82306 VITAMIN D 25 HYDROXY: CPT

## 2020-01-03 PROCEDURE — 1200000000 HC SEMI PRIVATE

## 2020-01-03 PROCEDURE — 82550 ASSAY OF CK (CPK): CPT

## 2020-01-03 PROCEDURE — 74177 CT ABD & PELVIS W/CONTRAST: CPT

## 2020-01-03 PROCEDURE — 6370000000 HC RX 637 (ALT 250 FOR IP): Performed by: EMERGENCY MEDICINE

## 2020-01-03 PROCEDURE — 99285 EMERGENCY DEPT VISIT HI MDM: CPT

## 2020-01-03 PROCEDURE — 36415 COLL VENOUS BLD VENIPUNCTURE: CPT

## 2020-01-03 PROCEDURE — 83036 HEMOGLOBIN GLYCOSYLATED A1C: CPT

## 2020-01-03 PROCEDURE — 81001 URINALYSIS AUTO W/SCOPE: CPT

## 2020-01-03 PROCEDURE — A9579 GAD-BASE MR CONTRAST NOS,1ML: HCPCS | Performed by: NURSE PRACTITIONER

## 2020-01-03 PROCEDURE — 82977 ASSAY OF GGT: CPT

## 2020-01-03 PROCEDURE — 2580000003 HC RX 258: Performed by: FAMILY MEDICINE

## 2020-01-03 PROCEDURE — 6360000002 HC RX W HCPCS: Performed by: FAMILY MEDICINE

## 2020-01-03 PROCEDURE — 83970 ASSAY OF PARATHORMONE: CPT

## 2020-01-03 PROCEDURE — 2709999900 HC NON-CHARGEABLE SUPPLY

## 2020-01-03 PROCEDURE — 71046 X-RAY EXAM CHEST 2 VIEWS: CPT

## 2020-01-03 PROCEDURE — 6370000000 HC RX 637 (ALT 250 FOR IP): Performed by: FAMILY MEDICINE

## 2020-01-03 PROCEDURE — 87086 URINE CULTURE/COLONY COUNT: CPT

## 2020-01-03 PROCEDURE — 80053 COMPREHEN METABOLIC PANEL: CPT

## 2020-01-03 PROCEDURE — 99223 1ST HOSP IP/OBS HIGH 75: CPT | Performed by: FAMILY MEDICINE

## 2020-01-03 PROCEDURE — 6360000004 HC RX CONTRAST MEDICATION: Performed by: NURSE PRACTITIONER

## 2020-01-03 PROCEDURE — 74183 MRI ABD W/O CNTR FLWD CNTR: CPT

## 2020-01-03 PROCEDURE — 71260 CT THORAX DX C+: CPT

## 2020-01-03 RX ORDER — CALCIUM CARBONATE 500(1250)
500 TABLET ORAL DAILY
Status: DISCONTINUED | OUTPATIENT
Start: 2020-01-03 | End: 2020-01-10 | Stop reason: HOSPADM

## 2020-01-03 RX ORDER — AMITRIPTYLINE HYDROCHLORIDE 25 MG/1
25 TABLET, FILM COATED ORAL NIGHTLY
Status: DISCONTINUED | OUTPATIENT
Start: 2020-01-03 | End: 2020-01-10 | Stop reason: HOSPADM

## 2020-01-03 RX ORDER — ACETAMINOPHEN 325 MG/1
650 TABLET ORAL EVERY 6 HOURS PRN
Status: DISCONTINUED | OUTPATIENT
Start: 2020-01-03 | End: 2020-01-10 | Stop reason: HOSPADM

## 2020-01-03 RX ORDER — DEXTROSE MONOHYDRATE 25 G/50ML
12.5 INJECTION, SOLUTION INTRAVENOUS PRN
Status: DISCONTINUED | OUTPATIENT
Start: 2020-01-03 | End: 2020-01-07

## 2020-01-03 RX ORDER — ACETAMINOPHEN 325 MG/1
650 TABLET ORAL NIGHTLY
Status: DISCONTINUED | OUTPATIENT
Start: 2020-01-03 | End: 2020-01-10 | Stop reason: HOSPADM

## 2020-01-03 RX ORDER — PRAVASTATIN SODIUM 20 MG
20 TABLET ORAL NIGHTLY
Status: DISCONTINUED | OUTPATIENT
Start: 2020-01-03 | End: 2020-01-10 | Stop reason: HOSPADM

## 2020-01-03 RX ORDER — DOCUSATE SODIUM 100 MG/1
100 CAPSULE, LIQUID FILLED ORAL 2 TIMES DAILY
Status: DISCONTINUED | OUTPATIENT
Start: 2020-01-03 | End: 2020-01-10 | Stop reason: HOSPADM

## 2020-01-03 RX ORDER — SODIUM CHLORIDE 0.9 % (FLUSH) 0.9 %
10 SYRINGE (ML) INJECTION EVERY 12 HOURS SCHEDULED
Status: DISCONTINUED | OUTPATIENT
Start: 2020-01-03 | End: 2020-01-10 | Stop reason: HOSPADM

## 2020-01-03 RX ORDER — TRAMADOL HYDROCHLORIDE 50 MG/1
50 TABLET ORAL EVERY 12 HOURS PRN
Status: DISCONTINUED | OUTPATIENT
Start: 2020-01-03 | End: 2020-01-10 | Stop reason: HOSPADM

## 2020-01-03 RX ORDER — METOPROLOL SUCCINATE 25 MG/1
25 TABLET, EXTENDED RELEASE ORAL DAILY
Status: DISCONTINUED | OUTPATIENT
Start: 2020-01-03 | End: 2020-01-10 | Stop reason: HOSPADM

## 2020-01-03 RX ORDER — SODIUM CHLORIDE 0.9 % (FLUSH) 0.9 %
10 SYRINGE (ML) INJECTION PRN
Status: DISCONTINUED | OUTPATIENT
Start: 2020-01-03 | End: 2020-01-10 | Stop reason: HOSPADM

## 2020-01-03 RX ORDER — NICOTINE POLACRILEX 4 MG
15 LOZENGE BUCCAL PRN
Status: DISCONTINUED | OUTPATIENT
Start: 2020-01-03 | End: 2020-01-10 | Stop reason: HOSPADM

## 2020-01-03 RX ORDER — ONDANSETRON 2 MG/ML
4 INJECTION INTRAMUSCULAR; INTRAVENOUS EVERY 6 HOURS PRN
Status: DISCONTINUED | OUTPATIENT
Start: 2020-01-03 | End: 2020-01-10 | Stop reason: HOSPADM

## 2020-01-03 RX ORDER — DEXTROSE MONOHYDRATE 50 MG/ML
100 INJECTION, SOLUTION INTRAVENOUS PRN
Status: DISCONTINUED | OUTPATIENT
Start: 2020-01-03 | End: 2020-01-07

## 2020-01-03 RX ORDER — ASPIRIN 81 MG/1
81 TABLET, CHEWABLE ORAL DAILY
Status: DISCONTINUED | OUTPATIENT
Start: 2020-01-03 | End: 2020-01-10 | Stop reason: HOSPADM

## 2020-01-03 RX ORDER — LIDOCAINE 4 G/G
1 PATCH TOPICAL DAILY
Status: DISCONTINUED | OUTPATIENT
Start: 2020-01-03 | End: 2020-01-10 | Stop reason: HOSPADM

## 2020-01-03 RX ORDER — LEVOTHYROXINE SODIUM 88 UG/1
88 TABLET ORAL DAILY
Status: DISCONTINUED | OUTPATIENT
Start: 2020-01-03 | End: 2020-01-10 | Stop reason: HOSPADM

## 2020-01-03 RX ORDER — RAMIPRIL 2.5 MG/1
2.5 CAPSULE ORAL DAILY
Status: DISCONTINUED | OUTPATIENT
Start: 2020-01-03 | End: 2020-01-10 | Stop reason: HOSPADM

## 2020-01-03 RX ORDER — DOXYCYCLINE HYCLATE 100 MG
50 TABLET ORAL DAILY
Status: DISCONTINUED | OUTPATIENT
Start: 2020-01-03 | End: 2020-01-10 | Stop reason: HOSPADM

## 2020-01-03 RX ORDER — VENLAFAXINE 37.5 MG/1
75 TABLET ORAL DAILY
Status: DISCONTINUED | OUTPATIENT
Start: 2020-01-03 | End: 2020-01-10 | Stop reason: HOSPADM

## 2020-01-03 RX ADMIN — PRAVASTATIN SODIUM 20 MG: 20 TABLET ORAL at 21:35

## 2020-01-03 RX ADMIN — ENOXAPARIN SODIUM 40 MG: 40 INJECTION SUBCUTANEOUS at 21:35

## 2020-01-03 RX ADMIN — CALCIUM 500 MG: 500 TABLET ORAL at 16:28

## 2020-01-03 RX ADMIN — DOXYCYCLINE HYCLATE 50 MG: 100 TABLET, COATED ORAL at 16:28

## 2020-01-03 RX ADMIN — IOPAMIDOL 80 ML: 755 INJECTION, SOLUTION INTRAVENOUS at 09:50

## 2020-01-03 RX ADMIN — LEVOTHYROXINE SODIUM 88 MCG: 88 TABLET ORAL at 16:28

## 2020-01-03 RX ADMIN — RAMIPRIL 2.5 MG: 2.5 CAPSULE ORAL at 16:28

## 2020-01-03 RX ADMIN — METFORMIN HYDROCHLORIDE 500 MG: 500 TABLET ORAL at 19:03

## 2020-01-03 RX ADMIN — VENLAFAXINE 75 MG: 37.5 TABLET ORAL at 16:28

## 2020-01-03 RX ADMIN — INSULIN LISPRO 2 UNITS: 100 INJECTION, SOLUTION INTRAVENOUS; SUBCUTANEOUS at 21:35

## 2020-01-03 RX ADMIN — ACETAMINOPHEN 650 MG: 325 TABLET ORAL at 21:35

## 2020-01-03 RX ADMIN — Medication 10 ML: at 21:35

## 2020-01-03 RX ADMIN — AMITRIPTYLINE HYDROCHLORIDE 25 MG: 25 TABLET, FILM COATED ORAL at 21:35

## 2020-01-03 RX ADMIN — METOPROLOL SUCCINATE 25 MG: 25 TABLET, FILM COATED, EXTENDED RELEASE ORAL at 16:28

## 2020-01-03 RX ADMIN — INSULIN LISPRO 6 UNITS: 100 INJECTION, SOLUTION INTRAVENOUS; SUBCUTANEOUS at 19:03

## 2020-01-03 RX ADMIN — GADOTERIDOL 10 ML: 279.3 INJECTION, SOLUTION INTRAVENOUS at 20:13

## 2020-01-03 ASSESSMENT — PAIN SCALES - GENERAL
PAINLEVEL_OUTOF10: 0
PAINLEVEL_OUTOF10: 0
PAINLEVEL_OUTOF10: 3
PAINLEVEL_OUTOF10: 0

## 2020-01-03 ASSESSMENT — ENCOUNTER SYMPTOMS
BACK PAIN: 1
SHORTNESS OF BREATH: 0
VOMITING: 0
ABDOMINAL PAIN: 0
NAUSEA: 0

## 2020-01-03 ASSESSMENT — PAIN DESCRIPTION - PAIN TYPE: TYPE: ACUTE PAIN

## 2020-01-03 ASSESSMENT — PAIN DESCRIPTION - LOCATION: LOCATION: BACK

## 2020-01-03 ASSESSMENT — PAIN DESCRIPTION - FREQUENCY: FREQUENCY: CONTINUOUS

## 2020-01-03 ASSESSMENT — PAIN DESCRIPTION - ORIENTATION: ORIENTATION: RIGHT;MID

## 2020-01-03 NOTE — ED NOTES
Patient presents to ED for R. Mid back pain and incontinence. Patient reports before Jay she fell when trying to sit in a chair and has had the R. Mid back pain since but is worse with movement. Patient reports in the last two days she has had bladder incontinence. Denies any dysuria or hematuria. Rates pain 3/10. Shows no signs of distress. Skin warm and dry. Respirations easy and unlabored.       Rosa Mcfarland RN  01/03/20 [id number]

## 2020-01-03 NOTE — CONSULTS
Patient has no known allergies. Social History:   TOBACCO:   reports that she has never smoked. She has never used smokeless tobacco.  ETOH:   reports no history of alcohol use. Review Of Systems  GENERAL: No fever, chills or weight loss. EYES:  No  blurred vision, double vision   CARDIOVASCULAR: No chest pain or palpitations. RESPIRATORY:  No dyspnea or cough. GI:  See HPI  MUSCULOSKELETAL: +rib pain +back pain  :   No dysuria or hematuria. SKIN:  No rashes or jaundice. NEUROLOGIC:  No headaches or seizures, numbness or tingling of arms, or legs. PSYCH:  No anxiety or depression. ENDOCRINE:  No polyuria or polydipsia. BLOOD:  No anemia, bleeding disorder, blood or blood product transfusion. PHYSICAL EXAM:  /75   Pulse 85   Temp 98.3 °F (36.8 °C) (Oral)   Resp 18   Ht 5' 3\" (1.6 m)   Wt 150 lb (68 kg)   SpO2 95%   BMI 26.57 kg/m²     General appearance: No apparent distress, appears stated age and cooperative. HEENT: Normal cephalic, atraumatic without obvious deformity. Pupils equal, round, and reactive to light. Neck: Supple, with full range of motion. No jugular venous distention. Trachea midline. Respiratory:  Normal respiratory effort. Clear to auscultation, bilaterally without Rales/Wheezes/Rhonchi. Cardiovascular: Regular rate and rhythm without murmurs, rubs or gallops. Abdomen: Soft, non-tender, non-distended with active bowel sounds. Musculoskeletal: No clubbing, cyanosis or edema bilaterally. Skin: Pink, warm, dry. No rashes or lesions.   Psychiatric: Alert and oriented, thought content appropriate, normal insight    Labs:   Recent Labs     01/03/20  0830   WBC 7.8   HGB 13.2   HCT 39.6        Recent Labs     01/03/20  0830      K 4.1   CL 97*   CO2 26   BUN 15   CREATININE 0.8   CALCIUM 9.7     Recent Labs     01/03/20  0830   AST 16   ALT 19   BILITOT 0.3   ALKPHOS 171*     Radiology:   CT chest 01/03/20      Impression   Acute minimally displaced fracture of the anterior T2 vertebral body with an age indeterminate mild to moderate compression fracture component. Associated mild prevertebral edema or hemorrhage.    2.3 x 3 cm right upper lobe pulmonary mass consistent with primary lung carcinoma versus metastasis. 8 x 7 mm left upper lobe pulmonary nodule. No acute traumatic abnormality of the chest.   No acute pneumonia. Bilateral multilobar atelectasis. 1.5 x 1.3 cm right thyroid nodule, consider nonemergent ultrasound for further evaluation. Postoperative changes of the left breast.     CT abdomen/pelvis 01/03/20     Impression   No acute traumatic abnormality of the solid or hollow viscera of the abdomen and pelvis. No acute inflammatory or infectious process in the abdomen or pelvis. Colonic diverticulosis without diverticulitis. Severe pancreatic atrophy with marked diffuse pancreatic ductal dilatation. 2 subcentimeter nodular foci in the duct in the pancreatic tail. Neoplasm not excluded. Code Status: Full Code    ASSESSMENT:  1. Right rib and back pain secondary to fall  2. Fracture anterior T2 vertebral body  3. Right upper lobe pulmonary mass  4. Left upper lobe nodule  5. H/O breast cancer- radiation complete 2018  6. Right thyroid nodule  7. Pancreatic ductal dilatation and nodular foci  8. DM  9. Urinary incontinence  10. CKD    PLAN:    1. MRCP  2. Oncology on board, planning for CT biopsy of lung mass  3. Await palliative care recs  4. Monitor labs  5. Supportive care per primary team  Will follow       Case reviewed and impression/plan reviewed in collaboration with Dr. Marlin Nuñez  Electronically signed by JAYSON Liu CNP on 1/3/2020 at 3:42 PM    [facility]  Thank you for the consultation.

## 2020-01-03 NOTE — CONSULTS
[facility]    Patient - Mirtha Garza   MRN -  [id number]   Wilber Stephen # - [de-identified]   - 1934      Date of Admission -  1/3/2020  7:51 AM  Date of evaluation -  1/3/2020  Room - Zahira Ash MD Primary Care Physician - Rocio Casey MD       Reason for Consult    Hx breast cancer  New lung mass and possible pancreatic lesion    [address] Problems    Diagnosis Date Noted    Intractable pain [R52] 2020     JOHN Garza is a 80 y.o. female with stage IC triple negative breast cancer s/p surgery and radiation was admitted for intractable pain and found to have T2 wedge fracture after fall on . On CT chest imaging, a lung mass was noted and CTAP showed a pancreatic lesion concerning for metastasis or new primary. She denies cough, or hemoptysis or weight loss, fever, chills, headache, SOB/VINCENT, chest pain or palpitations, abdominal pain, nausea/vomiting, diarrhea/constipation, new leg pain or swelling. She has had a couple episodes of incontinence where she cannot get to the bathroom in time. Oncology History    Checo Springer is an 80-year-old female with h/o stage IC triple negative breast cancer. She had a screening mammogram in 2016, it showed 1.4 cm irregular mass in the upper inner aspect of the left breast.  Subsequently she had a diagnostic mammogram and left breast ultrasound on 2016. They  showed 1.6 x 1 x 1.4 cm irregular solid mass in the left breast highly suspicious for malignancy. On 2016 she underwent left breast, upper inner quadrant core needle biopsies. Pathology report showed:   Invasive, poorly differentiated ductal carcinoma, [place] score III.   Estrogen Receptor:  (X) Negative ( < 1% of cells staining)  Progesterone Receptor: (X) Negative ( < 1% of cells staining)  Ki-67 (clone 30-9)  Percentage of positive LEFT BREAST    Depression     Diabetes ([facility] [address]     Hyperlipidemia     Hypertension     Hypothyroidism     PONV (postoperative nausea and vomiting)     Spinal stenosis       Past Surgical History           Procedure Laterality Date    BREAST SURGERY Left     lumpectomy    EYE SURGERY  2006    cataracts    HYSTERECTOMY  1980    OTHER SURGICAL HISTORY Left 11/29/2016    Re-excision deep margins left breast    TONSILLECTOMY      as a child     Diet    DIET GENERAL; No Added Salt (3-4 GM)  Allergies    Patient has no known allergies.   Social History     Social History     Socioeconomic History    Marital status:      Spouse name: Not on file    Number of children: Not on file    Years of education: Not on file    Highest education level: Not on file   Occupational History    Not on file   Social Needs    Financial resource strain: Not on file    Food insecurity:     Worry: Not on file     Inability: Not on file    Transportation needs:     Medical: Not on file     Non-medical: Not on file   Tobacco Use    Smoking status: Never Smoker    Smokeless tobacco: Never Used   Substance and Sexual Activity    Alcohol use: No    Drug use: No    Sexual activity: Not on file   Lifestyle    Physical activity:     Days per week: Not on file     Minutes per session: Not on file    Stress: Not on file   Relationships    Social connections:     Talks on phone: Not on file     Gets together: Not on file     Attends [employer] service: Not on file     Active member of club or organization: Not on file     Attends meetings of clubs or organizations: Not on file     Relationship status: Not on file    Intimate partner violence:     Fear of current or ex partner: Not on file     Emotionally abused: Not on file     Physically abused: Not on file     Forced sexual activity: Not on file   Other Topics Concern    Not on file   Social History Narrative    Not on file     Family History          Problem Relation Age of Onset    Cancer Father         brain    Cancer Paternal Grandfather         stomach     ROS     Review of Systems   Pertinent review of systems noted in HPI, all other ROS negative. Vitals     height is 5' 3\" (1.6 m) and weight is 150 lb (68 kg). Her oral temperature is 98.3 °F (36.8 °C). Her blood pressure is 135/75 and her pulse is 85. Her respiration is 18 and oxygen saturation is 95%. Exam   Physical Exam   General appearance: No apparent distress, alert and cooperative. Eating lunch  HEENT: Pupils equal, round, and reactive to light. Conjunctivae/corneas clear. Oral mucosa moist  Neck: Supple, with full range of motion. Trachea midline. Respiratory:  Normal respiratory effort. Clear to auscultation, bilaterally without Rales/Wheezes/Rhonchi. Cardiovascular: Regular rate and rhythm with normal S1/S2 without murmurs, rubs or gallops. Abdomen: Soft, non-tender, non-distended with active bowel sounds. Musculoskeletal: No clubbing, cyanosis or edema bilaterally. Full range of motion without deformity. Skin: Skin color, texture, turgor normal.  No rashes or lesions. Neurologic:  Neurovascularly intact without any focal sensory/motor deficits. Cranial nerves: II-XII intact, grossly non-focal.  Tenderness at T2  Psychiatric: Alert and oriented, thought content appropriate, normal insight  Peripheral Pulses: +2 palpable, equal bilaterally        Labs   CBC  Recent Labs     01/03/20  0830   WBC 7.8   RBC 4.44   HGB 13.2   HCT 39.6   MCV 89.2   MCH 29.7   MCHC 33.3      MPV 9.0*      BMP  Recent Labs     01/03/20  0830      K 4.1   CL 97*   CO2 26   BUN 15   CREATININE 0.8   GLUCOSE 314*   CALCIUM 9.7     LFT  Recent Labs     01/03/20  0830   AST 16   ALT 19   BILITOT 0.3   ALKPHOS 171*     INR  No results for input(s): INR, PROTIME in the last 72 hours. PTT  No results for input(s): APTT in the last 72 hours.     Radiology        Xr Chest Standard (2 Vw)    Result Date: 1/3/2020  PROCEDURE: XR CHEST (2 VW) CLINICAL INFORMATION: trauma, fall R lateral rib pain, urinary incontinence. COMPARISON: No prior study. TECHNIQUE: PA and lateral views of the chest. FINDINGS: Lungs/pleura: There is a 2.4 x 2.3 cm right upper lobe mass. There is no pneumonia or pulmonary edema. There is moderate to marked elevation of the right hemidiaphragm with mild right basilar atelectasis. There is minimal linear atelectasis or scarring in the mid left lung. There is a 3 to 4 mm right basilar pulmonary nodule which may represent a calcified granuloma. No pleural effusion. No pneumothorax. Heart: Heart size is normal. Mediastinum/rachid: No obvious mass or adenopathy. Skeleton: Bones are osteopenic. There are mild degenerative changes of the thoracic spine. There is no obvious fracture or dislocation. Soft tissues: Left breast soft tissues are smaller than those on the right suggestive of prior mastectomy. There are left lateral chest wall surgical clips. 2.4 x 2.3 cm right upper lobe mass. Recommend chest CT for further evaluation. 3 to 4 mm right basilar pulmonary nodule, possibly a calcified granuloma. Elevated right diaphragm with mild right basilar atelectasis. Minimal mid left lung atelectasis or scarring. No acute pneumonia. **This report has been created using voice recognition software. It may contain minor errors which are inherent in voice recognition technology. ** Final report electronically signed by Dr. Sanam Luis on 1/3/2020 9:05 AM    Ct Head Wo Contrast    Result Date: 1/3/2020  PROCEDURE: CT HEAD WO CONTRAST CLINICAL INFORMATION: fall, incontinence. COMPARISON: None TECHNIQUE: Noncontrast 5 mm axial images were obtained through the brain. Sagittal and coronal reconstructions were obtained and reviewed.  All CT scans at this facility use dose modulation, iterative reconstruction, and/or weight-based dosing when appropriate to reduce radiation dose to as low as reasonably achievable. FINDINGS: Brain: There is no acute ischemic infarct, hemorrhage, midline shift, mass, or mass effect. There are mild deep white matter hypodensities, nonspecific in nature but probably representing small vessel chronic ischemic changes. There is age appropriate cortical atrophy. Ventricles/basal cisterns: The ventricles and cisterns are of appropriate size and configuration for the patient's age. No evidence of obstructive hydrocephalus. Vascular: There are atherosclerotic calcifications in the bilateral vertebral and cavernous internal carotid arteries. Skull base/calvarium/osseous structures: Unremarkable Soft tissues: Unremarkable Intraorbital contents: Unremarkable Sinuses: Unremarkable; the imaged sinuses are clear without evidence of mucosal thickening or fluid levels. Mastoids: Unremarkable; the mastoid air cells are clear. No acute ischemic infarct, hemorrhage, or mass effect. Aging changes as discussed above. **This report has been created using voice recognition software. It may contain minor errors which are inherent in voice recognition technology. ** Final report electronically signed by Dr. Lucía Brower on 1/3/2020 10:22 AM    Ct Chest W Contrast    Result Date: 1/3/2020  PROCEDURE: CT CHEST W CONTRAST CLINICAL INFORMATION: fall. COMPARISON: Chest x-ray earlier today TECHNIQUE: 5 mm axial images were obtained through the chest after the administration of intravenous contrast. Sagittal and coronal reconstructions were obtained. All CT scans at this facility use dose modulation, iterative reconstruction, and/or weight-based dosing when appropriate to reduce radiation dose to as low as reasonably achievable. FINDINGS: Lungs: There is a lobulated 2.3 x 3 cm lateral right upper lobe pulmonary mass consistent with primary lung carcinoma versus a metastasis; there is an additional 8 x 7 mm left upper lobe pulmonary nodule (axial image 26).  There is also a subtle 4 x 3 mm noncalcified nodule in the lateral right middle lobe (axial image 31). There are a few scattered calcified granulomas. There is bilateral multilobar atelectasis. There is no pneumonia. The trachea and major bronchi are unremarkable. Pleura: No pleural effusion. No pneumothorax. Diaphragm: There is moderate elevation of the right hemidiaphragm. Heart: Heart size is normal. There is no pericardial effusion. Isamar and mediastinum: There is no mass or adenopathy. There are calcified mediastinal and right hilar lymph nodes. There is a small hiatal hernia. Pulmonary vasculature: Unremarkable Thoracic aorta/vascular: No thoracic aortic aneurysm or dissection. The imaged portions of the brachiocephalic arteries are unremarkable. Imaged upper abdomen: See the accompanying abdomen pelvis CT report. Musculoskeletal system: There is multilevel cervical and thoracic degenerative disc disease and endplate spondylosis. There is a mild to moderate compression fracture of T2 of indeterminate age however there is an acute minimally displaced fracture of the anterior aspect of the vertebral body. This is better seen on the accompanying cervical spine CT. There is associated mild prevertebral edema or hemorrhage at this level. . There is an old mild compression fracture of T7. There is mild thoracic levoscoliosis. Chest/body wall soft tissues: There are postoperative changes of the left breast with multiple surgical clips and an adjacent 1.9 x 1.3 cm scar in the medial left breast. Thyroid: There is a 1.5 x 1.3 cm right thyroid nodule. Consider nonemergent ultrasound for further evaluation. Acute minimally displaced fracture of the anterior T2 vertebral body with an age indeterminate mild to moderate compression fracture component. Associated mild prevertebral edema or hemorrhage. 2.3 x 3 cm right upper lobe pulmonary mass consistent with primary lung carcinoma versus metastasis. 8 x 7 mm left upper lobe pulmonary nodule.  No acute traumatic unremarkable. There is no evidence of bowel wall thickening. No evidence of bowel obstruction. Appendix: Unremarkable. No findings to suggest acute appendicitis. Omentum and mesentery: Unremarkable Aorta, vascular: No aortic aneurysm or dissection. No significant vascular abnormality. Reproductive: The uterus is absent consistent with hysterectomy. The adnexal regions are unremarkable. There is a pessary in the vagina. Bladder: The wall of the urinary bladder is mildly thickened, most likely due to incomplete distension. No calcified stones. Intraperitoneal/retroperitoneal Space: There is no ascites, abscess, adenopathy, or mass. No pneumoperitoneum. Abdominal and pelvic body wall soft tissues: There is a tiny fat-containing umbilical hernia. Musculoskeletal structures: There are old mild compression fracture deformities of T7 and T8. There is no acute fracture. There is multilevel thoracolumbar degenerative disc disease with endplate spondylosis. There is multilevel lumbar facet joint DJD. There is grade 1 anterolisthesis of L4 on L5, likely degenerative in nature. No acute traumatic abnormality of the solid or hollow viscera of the abdomen and pelvis. No acute inflammatory or infectious process in the abdomen or pelvis. Colonic diverticulosis without diverticulitis. Severe pancreatic atrophy with marked diffuse pancreatic ductal dilatation. 2 subcentimeter nodular foci in the duct in the pancreatic tail. Neoplasm not excluded. Additional nonemergent findings as detailed above. **This report has been created using voice recognition software. It may contain minor errors which are inherent in voice recognition technology. ** Final report electronically signed by Dr. Darwin Guerrero on 1/3/2020 10:55 AM    Ct Lumbar Reconstruction Wo Post Process    Result Date: 1/3/2020  PROCEDURE: CT LUMBAR RECONSTRUCTION WO POST PROCESS CLINICAL INFORMATION: pain. Rib and back pain.  COMPARISON: Lumbar spine radiographs dated 9/19/2016. TECHNIQUE: Axial, sagittal and coronal reformatted images of the lumbar spine reconstructed from the patient's abdomen and pelvis CT. IV contrast is present. All CT scans at this facility use dose modulation, iterative reconstruction, and/or weight-based dosing when appropriate to reduce radiation dose to as low as reasonably achievable. FINDINGS: There is grade 1 anterolisthesis of L4 relative to L5 on the basis of degenerative change, stable compared to prior radiographs. There is otherwise anatomic vertebral body height and alignment. No fracture of the lumbar vertebral column is identified. There is mild osteophytosis at the sacral iliac joints bilaterally. There is disc space narrowing and vacuum disc phenomenon at T8-9 through T11-12 with associated anterior osteophytes. At T12-L1 there is no significant spinal canal or neuroforaminal stenosis. At L1-2 there is a shallow disc bulge without significant spinal canal stenosis and moderate bilateral neural foraminal stenosis in association with mild facet hypertrophy. At L2-3 there is a shallow disc bulge with facet hypertrophy and ligamentum flavum thickening which causes moderate spinal canal stenosis. There is also moderate bilateral foraminal stenosis at this level. At L3-4 there is a shallow disc bulge with prominent facet hypertrophy and ligamentum flavum thickening which causes moderate to severe spinal canal stenosis and moderate to severe left and moderate right neuroforaminal stenosis. At L4-5 there is pronounced facet hypertrophy and ligament flavum thickening causing the anterolisthesis of L4 relative to L5 with partial uncovering the disc and superimposed disc bulge contributing to moderate to severe spinal canal stenosis and mild to moderate left and moderate right neural foraminal stenosis. At L5-S1 there is no significant spinal canal stenosis.  There is mild to moderate bilateral neural foraminal stenosis in association with facet Imaging Technologist: Falguni Gallardo RT(R)(M), [facility] letter sent: Normal-All Doctor Names  [id number]       Assessment/Recommendations    Shahab Hugo is a 80 y.o. female with stage IC triple negative breast cancer s/p surgery and radiation was admitted for intractable pain and found to have T2 wedge fracture after fall on 12/21. On CT chest imaging, a lung mass was noted and CTAP showed a pancreatic lesion concerning for metastasis or new primary. Stage IC triple negative breast cancer  - follows with Dr. Ronald Virk  - s/p lumpectomy and sentinel lymph node biopsy and post surgical RT. Decided not to do chemotherapy. Has been followed in surveillance since   - last mammogram 12/9 which was neg for recurrence. Recommended yearly follow up. - Will need to see Dr. Ronald Virk one week after CT guided biopsy completed. New lung mass - concern for new primary or metastasis  - CT guided biopsy - discussed with patient and her daughters (POA) and they are ok with proceeding with biopsy    Possible pancreatic neoplasm- seen on CTAP  - MRI abdomen with pancreas focus    T2 wedge compression fracture  - orthopedic consult    Other diagnoses  DM type 2  Essential hypertension  Hypothyroidism    Case discussed with nurse and patient/family. Questions and concerns addressed.   Plan made in collaboration with Dr. Ronald Virk    Electronically signed by   JAYSON Helm NP on 1/3/2020 at 2:00 PM

## 2020-01-03 NOTE — ED NOTES
Patient cathed for urine specimen and tolerated well. Urine specimen obtained and sent to lab.       Serge Neville RN  01/03/20 0044

## 2020-01-03 NOTE — H&P
Assessment and Plan:        1. Intractable right lower chest wall pain with RUQ pain: Patient had recent fall on [address] and never seaked any medical attention since then, has history of left breast cancer diagnosed in 2017 s/p lumpectomy and radiation therapy completed 2018. CT abdomen/pelvis showed Severe pancreatic atrophy with marked diffuse pancreatic ductal dilatation. 2 subcentimeter nodular foci in the duct in the pancreatic tail. Neoplasm not excluded. May use tylenol 650 q6 hours PRN for moderate pain and tramadol 50 mg q 12 hours for severe pain. Consult IP GI team for above findings and concern of metastasis. Also consult Oncologist as they know the patient already for further recommendations. 2. Acute minimally displaced fracture of the anterior T2 vertebral body with an age indeterminate mild to moderate compression fracture component:  Pain medications as in #1 and oncology team consulted  3. 2.3 x 3 cm right upper lobe pulmonary mass consistent with primary lung carcinoma versus metastasis: oncology team consulted  4. 8 x 7 mm left upper lobe pulmonary nodule:  oncology team consulted  5. 1.5 x 1.3 cm right thyroid nodule: US outpatient  6. History of left breast cancer s/p lumpectomy and radiation therapy completed 2018: oncology team consulted  7. Urinary incontinence: no saddle anesthesia, radiculopathy, lower back pain, or bowel incontinence, monitor closely with Is&Os  8. Slight ALP elevation: check CPK, GGT, vit D, parathyroid H, other liver enzymes normal.  9. Type II DM uncontrolled: A1c 7/2018 8.7, repeat today, start sliding scale, restart metformin, if continue elevated BS levels then may increase metformin dose, if A1c > 9 then may start basal insulin. BS level currently 314. 10. CKD II: avoid nephrotoxics, monitor closely.   11. Giving above findings will consult Palliative care for future goals of care    PMH, PSH, SH, FHX reviewed in chart review snap shot in EPIC    CC:

## 2020-01-03 NOTE — ED NOTES
ED to inpatient nurses report    Chief Complaint   Patient presents with    Back Pain    Incontinence      Present to ED from home  LOC: alert and orientated to name, place, date  Vital signs   Vitals:    01/03/20 0836 01/03/20 1031 01/03/20 1059 01/03/20 1230   BP: 133/74  (!) 151/87 135/75   Pulse: 80 86 86 85   Resp: 18 18 18 18   Temp:       TempSrc:       SpO2: 100% 100% 96% 95%   Weight:       Height:          Oxygen Baseline roomair    Current needs required none at this time. LDAs:   Peripheral IV 01/03/20 Right Antecubital (Active)   Site Assessment Clean;Dry; Intact 1/3/2020 12:31 PM   Line Status Normal saline locked 1/3/2020 12:31 PM   Dressing Status Clean;Dry; Intact 1/3/2020 12:31 PM   Dressing Intervention New 1/3/2020  8:26 AM     Mobility: patient remained in cot while in ED. Pending ED orders: none at this time.    Present condition: stable    Electronically signed by Erin Wynn RN on 1/3/2020 at 12:46 PM       Erin Wynn RN  01/03/20 [id number]

## 2020-01-03 NOTE — ED PROVIDER NOTES
[facility] [address] [address] EMERGENCY DEPT  eMERGENCYdEPARTMENT eNCOUnter      Pt Name: Mirtha Garza  MRN: [id number]  [place] 1934  Date of evaluation: 1/3/2020  Provider:Kendrick Ferguson DO, [place] COMPLAINT       Chief Complaint   Patient presents with    Back Pain    Incontinence       HISTORY OF PRESENT ILLNESS    Mirtha Garza is a 80 y.o. female who presents to the emergency department for evaluation of complaints/symptoms in result of a fall taking place on 12/21/19. The patient was standing in the kitchen at a family [place] party when she stepped backwards and lost her footing over a chair. Patient states she fell flat on her back, hitting her head on the patio door in the duration of this fall. This fall was witnessed. There was no LOC. Patient was helped up by family members and has been ambulatory since the fall. She presents here with complaints of lower back as well as right rib pain with gradual onset since the fall. She also had an episode of urinary incontinence early this morning which is unusual for her. She denies bowel incontinence. She denies numbness to these areas. She denies any  symptoms over the past few days leading up to this episode of incontinence. Patient denies chest, abdominal pain, or SOB. She denies any new weakness, tingling, numbness of her extremities since this fall. In regards to hitting her head, she denies headache, vision change, lightheadedness, or dizziness. She was asymptomatic prior to this fall and states it was strictly mechanical. The patient has a medical history including HTN, HLD, DM, arthritis, spinal stenosis, and breast cancer. DIL is at bedside who was one of the family members that witnessed this fall. She confirms patient's story. There are no other complaints, symptoms, or concerns voiced at this time. Triage notes and Nursing notes were reviewed by myself. Any discrepancies are addressedabove.     PAST MEDICAL HISTORY     Past Medical History:   Diagnosis Date    Arthritis     Cancer ([facility] [address] 11/2016    LEFT BREAST    Depression     [facility]     Hyperlipidemia     Hypertension     Hypothyroidism     PONV (postoperative nausea and vomiting)     Spinal stenosis        SURGICALHISTORY       Past Surgical History:   Procedure Laterality Date    BREAST SURGERY Left     lumpectomy    EYE SURGERY  2006    cataracts    HYSTERECTOMY  1980    OTHER SURGICAL HISTORY Left 11/29/2016    Re-excision deep margins left breast    TONSILLECTOMY      as a child       CURRENT MEDICATIONS       Previous Medications    ACETAMINOPHEN (TYLENOL) 325 MG TABLET    Take 650 mg by mouth nightly    AMITRIPTYLINE (ELAVIL) 10 MG TABLET    Take 25 mg by mouth nightly     ASPIRIN 81 MG TABLET    Take 81 mg by mouth daily    CALCIUM CARBONATE 600 MG TABS TABLET    Take 1 tablet by mouth daily    DESONIDE (DESOWEN) 0.05 % CREAM    Apply topically 2 times daily Apply topically 2 times daily. DOCUSATE SODIUM (COLACE) 100 MG CAPSULE    Take 100 mg by mouth 2 times daily    DOXYCYCLINE HYCLATE (PERIOSTAT) 20 MG TABLET    Take 50 mg by mouth daily     LEVOTHYROXINE (SYNTHROID) 125 MCG TABLET    Take 88 mcg by mouth Daily     METFORMIN (GLUCOPHAGE) 500 MG TABLET    Take 500 mg by mouth 2 times daily (with meals)    METOPROLOL SUCCINATE (TOPROL XL) 25 MG EXTENDED RELEASE TABLET    Take 25 mg by mouth daily     MULTIPLE VITAMINS-MINERALS (MULTIVITAMIN PO)    Take by mouth daily    MULTIPLE VITAMINS-MINERALS (PRESERVISION AREDS PO)    Take 1 capsule by mouth 2 times daily    PRAVASTATIN (PRAVACHOL) 20 MG TABLET    Take 20 mg by mouth daily    RAMIPRIL (ALTACE) 5 MG CAPSULE    Take 2.5 mg by mouth daily     VENLAFAXINE (EFFEXOR) 37.5 MG TABLET    Take 75 mg by mouth daily        ALLERGIES     Patient has no known allergies.     FAMILY HISTORY       Family History   Problem Relation Age of Onset    Cancer Father         brain    Cancer Paternal Grandfather         stomach BP Temp Temp Source Pulse Resp SpO2 Height Weight   (!) 153/81 98.3 °F (36.8 °C) Oral 98 18 100 % 5' 3\" (1.6 m) 150 lb (68 kg)       Physical Exam  Vitals signs and nursing note reviewed. Exam conducted with a chaperone present. Constitutional:       Appearance: Normal appearance. She is well-developed. HENT:      Head: Normocephalic and atraumatic. Eyes:      Conjunctiva/sclera: Conjunctivae normal.   Neck:      Musculoskeletal: Normal range of motion and neck supple. Trachea: No tracheal deviation. Cardiovascular:      Rate and Rhythm: Normal rate and regular rhythm. Pulmonary:      Effort: Pulmonary effort is normal.      Breath sounds: Normal breath sounds. Chest:      Chest wall: Tenderness (point tenderness to inferior costal margin on the right) present. Abdominal:      General: There is no distension. Palpations: Abdomen is soft. Tenderness: There is no tenderness. Genitourinary:     Rectum: External hemorrhoid (non thrombosed) present. Comments: Rectal exam was performed. There was no blood appreciated. Patient has a good rectal tone and perirectal sensation. Musculoskeletal: Normal range of motion. Cervical back: Normal.      Thoracic back: Normal.      Lumbar back: She exhibits tenderness (midline). Comments: No bruising appreciated to back   Skin:     General: Skin is warm and dry. Neurological:      Mental Status: She is alert and oriented to person, place, and time. Cranial Nerves: No cranial nerve deficit. Sensory: Sensation is intact. Motor: Motor function is intact. No weakness. Comments: Patient has 5/5 strength of the BLE.         DIAGNOSTIC RESULTS     EKG: (none if blank)  All EKG's are interpreted by the Emergency Department Physician who either signs or Co-signs this chart in the absence of a cardiologist.    None     RADIOLOGY: (none if blank)  Interpretation per the Radiologist below, if available at the time of this Final report electronically signed by Dr. Morris Robledo MD on 1/3/2020 10:32 AM      CT CHEST W CONTRAST   Final Result   Acute minimally displaced fracture of the anterior T2 vertebral body with an age indeterminate mild to moderate compression fracture component. Associated mild prevertebral edema or hemorrhage. 2.3 x 3 cm right upper lobe pulmonary mass consistent with primary lung carcinoma versus metastasis. 8 x 7 mm left upper lobe pulmonary nodule. No acute traumatic abnormality of the chest.   No acute pneumonia. Bilateral multilobar atelectasis. 1.5 x 1.3 cm right thyroid nodule, consider nonemergent ultrasound for further evaluation. Postoperative changes of the left breast.      Results discussed with Dr. Micaela Simental on 1/3/2020 at 10:40 AM.      **This report has been created using voice recognition software. It may contain minor errors which are inherent in voice recognition technology. **      Final report electronically signed by Dr. Ricky Patel on 1/3/2020 10:43 AM      XR CHEST STANDARD (2 VW)   Final Result      2.4 x 2.3 cm right upper lobe mass. Recommend chest CT for further evaluation. 3 to 4 mm right basilar pulmonary nodule, possibly a calcified granuloma. Elevated right diaphragm with mild right basilar atelectasis. Minimal mid left lung atelectasis or scarring. No acute pneumonia. **This report has been created using voice recognition software. It may contain minor errors which are inherent in voice recognition technology. **      Final report electronically signed by Dr. Ricky Patel on 1/3/2020 9:05 AM          LABS:  Labs Reviewed   CBC WITH AUTO DIFFERENTIAL - Abnormal; Notable for the following components:       Result Value    MPV 9.0 (*)     All other components within normal limits   COMPREHENSIVE METABOLIC PANEL W/ REFLEX TO MG FOR LOW K - Abnormal; Notable for the following components:    Glucose 314 (*)     Chloride 97 (*)     Alkaline Phosphatase Estuardo Peterson DO. Signed by:Lesli Hahn, 01/03/20 11:24 AM    Provider:  I personally performed the services described in the documentation, reviewedand edited thedocumentation which was dictated to the scribe in my presence, and it accurately records my words and actions.     Estuardo Peterson DO 1/3/20 11:24 AM         Estuardo Peterson DO, FACEP (electronically signed)  Attending Emergency Physician        Estuardo Peterson DO  01/03/20 1124

## 2020-01-04 LAB
CA 19-9: 12 U/ML (ref 0–35)
ERYTHROCYTE [DISTWIDTH] IN BLOOD BY AUTOMATED COUNT: 12.7 % (ref 11.5–14.5)
ERYTHROCYTE [DISTWIDTH] IN BLOOD BY AUTOMATED COUNT: 42.5 FL (ref 35–45)
GLUCOSE BLD-MCNC: 193 MG/DL (ref 70–108)
GLUCOSE BLD-MCNC: 262 MG/DL (ref 70–108)
GLUCOSE BLD-MCNC: 364 MG/DL (ref 70–108)
GLUCOSE BLD-MCNC: 372 MG/DL (ref 70–108)
GLUCOSE BLD-MCNC: 80 MG/DL (ref 70–108)
HCT VFR BLD CALC: 38.2 % (ref 37–47)
HEMOGLOBIN: 12.2 GM/DL (ref 12–16)
LACTIC ACID: 0.9 MMOL/L (ref 0.5–2.2)
MCH RBC QN AUTO: 29.5 PG (ref 26–33)
MCHC RBC AUTO-ENTMCNC: 31.9 GM/DL (ref 32.2–35.5)
MCV RBC AUTO: 92.5 FL (ref 81–99)
PLATELET # BLD: 346 THOU/MM3 (ref 130–400)
PMV BLD AUTO: 9.2 FL (ref 9.4–12.4)
RBC # BLD: 4.13 MILL/MM3 (ref 4.2–5.4)
WBC # BLD: 8.2 THOU/MM3 (ref 4.8–10.8)

## 2020-01-04 PROCEDURE — 86301 IMMUNOASSAY TUMOR CA 19-9: CPT

## 2020-01-04 PROCEDURE — 83605 ASSAY OF LACTIC ACID: CPT

## 2020-01-04 PROCEDURE — 1200000000 HC SEMI PRIVATE

## 2020-01-04 PROCEDURE — 6370000000 HC RX 637 (ALT 250 FOR IP): Performed by: NURSE PRACTITIONER

## 2020-01-04 PROCEDURE — 36415 COLL VENOUS BLD VENIPUNCTURE: CPT

## 2020-01-04 PROCEDURE — 2709999900 HC NON-CHARGEABLE SUPPLY

## 2020-01-04 PROCEDURE — 94760 N-INVAS EAR/PLS OXIMETRY 1: CPT

## 2020-01-04 PROCEDURE — 6370000000 HC RX 637 (ALT 250 FOR IP): Performed by: FAMILY MEDICINE

## 2020-01-04 PROCEDURE — 85027 COMPLETE CBC AUTOMATED: CPT

## 2020-01-04 PROCEDURE — 2580000003 HC RX 258: Performed by: FAMILY MEDICINE

## 2020-01-04 PROCEDURE — 6360000002 HC RX W HCPCS: Performed by: FAMILY MEDICINE

## 2020-01-04 PROCEDURE — 82948 REAGENT STRIP/BLOOD GLUCOSE: CPT

## 2020-01-04 PROCEDURE — 99233 SBSQ HOSP IP/OBS HIGH 50: CPT | Performed by: NURSE PRACTITIONER

## 2020-01-04 RX ADMIN — PRAVASTATIN SODIUM 20 MG: 20 TABLET ORAL at 22:00

## 2020-01-04 RX ADMIN — METOPROLOL SUCCINATE 25 MG: 25 TABLET, FILM COATED, EXTENDED RELEASE ORAL at 09:40

## 2020-01-04 RX ADMIN — METFORMIN HYDROCHLORIDE 500 MG: 500 TABLET ORAL at 09:41

## 2020-01-04 RX ADMIN — INSULIN LISPRO 9 UNITS: 100 INJECTION, SOLUTION INTRAVENOUS; SUBCUTANEOUS at 18:21

## 2020-01-04 RX ADMIN — AMITRIPTYLINE HYDROCHLORIDE 25 MG: 25 TABLET, FILM COATED ORAL at 22:00

## 2020-01-04 RX ADMIN — VENLAFAXINE 75 MG: 37.5 TABLET ORAL at 22:00

## 2020-01-04 RX ADMIN — Medication 10 ML: at 09:45

## 2020-01-04 RX ADMIN — INSULIN LISPRO 15 UNITS: 100 INJECTION, SOLUTION INTRAVENOUS; SUBCUTANEOUS at 16:46

## 2020-01-04 RX ADMIN — ENOXAPARIN SODIUM 40 MG: 40 INJECTION SUBCUTANEOUS at 22:04

## 2020-01-04 RX ADMIN — INSULIN LISPRO 1 UNITS: 100 INJECTION, SOLUTION INTRAVENOUS; SUBCUTANEOUS at 09:43

## 2020-01-04 RX ADMIN — RAMIPRIL 2.5 MG: 2.5 CAPSULE ORAL at 09:40

## 2020-01-04 RX ADMIN — DOCUSATE SODIUM 100 MG: 100 CAPSULE, LIQUID FILLED ORAL at 22:03

## 2020-01-04 RX ADMIN — DOXYCYCLINE HYCLATE 50 MG: 100 TABLET, COATED ORAL at 09:40

## 2020-01-04 RX ADMIN — ACETAMINOPHEN 650 MG: 325 TABLET ORAL at 22:03

## 2020-01-04 RX ADMIN — CALCIUM 500 MG: 500 TABLET ORAL at 09:41

## 2020-01-04 RX ADMIN — Medication 10 ML: at 22:00

## 2020-01-04 RX ADMIN — LEVOTHYROXINE SODIUM 88 MCG: 88 TABLET ORAL at 06:19

## 2020-01-04 ASSESSMENT — PAIN SCALES - GENERAL
PAINLEVEL_OUTOF10: 0

## 2020-01-04 NOTE — PROGRESS NOTES
Hospitalist Progress Note      [facility]    [facility]Bed[facility]8A-09/009-A  YOB: 1934  MRN: [id number]   Acct: [de-identified]   PCP: Kathryn Camara MD  Date of Admission: 1/3/2020    Assessment/Plan:    1. Intractable right lower chest wall pain with RUQ pain: CT abdomen/pelvis demonstrated  severe pancreatic atrophy with marked diffuse pancreatic ductal dilatation. 2 subcentimeter nodular foci in the duct in the pancreatic tail. Neoplasm not excluded. MRCP conducted on 1/4/2020 reports pancreatic atrophy as well as moderate ductal dilation. Multiple tiny gallstones are noted. May use tylenol 650 q6h PRN for moderate pain and tramadol 50 mg q12h for severe pain. GI and oncology following, appreciate recommendations. 2. Type 2 diabetes mellitus, uncontrolled: Current hemoglobin A1c 10.2. Accu-Cheks before meals and at bedtime with sliding scale insulin coverage (high-dose corrective algorithm). Continue metformin. 3. Acute minimally displaced fracture of the anterior T2 vertebral body with an age indeterminate mild to moderate compression fracture component: Orthopedic surgery following. 4. 2.3x3 cm right upper lobe pulmonary mass consistent with primary lung carcinoma versus metastasis: Oncology following. 5. 8x7 mm left upper lobe pulmonary nodule: Oncology following. 6. 1.5x1.3 cm right thyroid nodule: Outpatient ultrasound will need to be conducted. 7. History of left breast cancer status post lumpectomy and radiation therapy completed 2018: Oncology following. 8. Urinary incontinence: Relatively new onset. No saddle anesthesia, radiculopathy, lower back pain, or bowel incontinence, monitor closely with I&O's. Attempt Keagle exercises. We will continue to monitor. 9. Hypothyroidism: Stable, continue Synthroid. 10. Hypertension: Stable, continue metoprolol. 11. Hyperlipidemia: Continue pravastatin.   12. Slight ALP elevation: situation and denies any physical complaints at this time. The patient reports having a good appetite and denies any chest pain, shortness of breath, nausea, vomiting, diarrhea, constipation, lightheadedness, dizziness, headaches, or vision changes. The patient and family were updated on the plan of care and they had no other needs or questions at this time. Past medical history, family history, social history and allergies reviewed again and is unchanged since admission. ROS (14 point review of systems completed. Pertinent positives noted. Otherwise ROS is negative) :  GENERAL: No fever,chills, or night sweats. SKIN: No lesions or rashes. HEAD: No headaches or recent injury. EYES: No acute changes in vision, no diplopia or blurred vision. EARS: No hearing loss, no tinnitus. NOSE/THROAT: No rhinorrhea or pharyngitis, no nasal drainage. NECK: No lumps or unusual neck stiffness. PULMONARY: Respirations easy and non-labored, no acute distress. CARDIAC: No chest pain, pressure, Negative for lower leg edema. GI: Abdomen is soft and non-tender, non-distended. PERIPHERAL VASCULAR: No intermittent claudication or unusual leg cramps. MUSCULOSKELETAL: Occasional arthralgias, myalgias. NEUROLOGICAL: Denies any headache, near syncope, seizures or syncope. HEMATOLOGIC:  No unusual bruising or bleeding. PSYCH: Denies any homicidal or suicidial ideations.     Medications:  Reviewed    Infusion Medications    dextrose       Scheduled Medications    insulin lispro  0-18 Units Subcutaneous TID WC    insulin lispro  0-9 Units Subcutaneous Nightly    lidocaine  1 patch Transdermal Daily    [Held by provider] metFORMIN  500 mg Oral BID WC    levothyroxine  88 mcg Oral Daily    amitriptyline  25 mg Oral Nightly    pravastatin  20 mg Oral Nightly    doxycycline hyclate  50 mg Oral Daily    calcium elemental  500 mg Oral Daily    docusate sodium  100 mg Oral BID    venlafaxine  75 mg Oral Daily    ramipril  2.5 mg Oral Daily    metoprolol succinate  25 mg Oral Daily    aspirin  81 mg Oral Daily    acetaminophen  650 mg Oral Nightly    sodium chloride flush  10 mL Intravenous 2 times per day    enoxaparin  40 mg Subcutaneous Daily     PRN Meds: acetaminophen, traMADol, sodium chloride flush, magnesium hydroxide, ondansetron, glucose, dextrose, glucagon (rDNA), dextrose      Intake/Output Summary (Last 24 hours) at 1/4/2020 1443  Last data filed at 1/4/2020 0930  Gross per 24 hour   Intake 360 ml   Output --   Net 360 ml       Diet:  DIET GENERAL; Carb Control: 3 carb choices (45 gms)/meal; No Added Salt (3-4 GM)    Exam:  /67   Pulse 86   Temp 97.9 °F (36.6 °C) (Oral)   Resp 18   Ht 5' 3\" (1.6 m)   Wt 150 lb (68 kg)   SpO2 97%   BMI 26.57 kg/m²     General appearance: Alert and appropriate, pleasant geriatric female. No apparent distress, appears stated age and cooperative. HEENT: Pupils equal, round, and reactive to light. Conjunctivae/corneas clear. Neck: Supple, with full range of motion. No jugular venous distention. Trachea midline. Respiratory:  Normal respiratory effort. Clear to auscultation, bilaterally without Rales/Wheezes/Rhonchi. Cardiovascular: Regular rate and rhythm with normal S1/S2 without murmurs, rubs or gallops. Abdomen: Soft, non-tender, non-distended with normal bowel sounds. Musculoskeletal: Passive and active ROM x 4 extremities. Skin: Skin color, texture, turgor normal.  No rashes or lesions. Neurologic:  Neurovascularly intact without any focal sensory/motor deficits. Cranial nerves: II-XII intact, grossly non-focal.  Psychiatric: Alert and oriented to person, place, time, and situation.  Thought content appropriate, normal insight  Capillary Refill: Brisk,< 3 seconds   Peripheral Pulses: +2 palpable, equal bilaterally     Labs:   Recent Labs     01/03/20  0830 01/04/20  0320   WBC 7.8 8.2   HGB 13.2 12.2   HCT 39.6 38.2    346     Recent Labs Pending)     Xr Chest Standard (2 Vw)    Result Date: 1/3/2020  PROCEDURE: XR CHEST (2 VW) CLINICAL INFORMATION: trauma, fall R lateral rib pain, urinary incontinence. COMPARISON: No prior study. TECHNIQUE: PA and lateral views of the chest. FINDINGS: Lungs/pleura: There is a 2.4 x 2.3 cm right upper lobe mass. There is no pneumonia or pulmonary edema. There is moderate to marked elevation of the right hemidiaphragm with mild right basilar atelectasis. There is minimal linear atelectasis or scarring in the mid left lung. There is a 3 to 4 mm right basilar pulmonary nodule which may represent a calcified granuloma. No pleural effusion. No pneumothorax. Heart: Heart size is normal. Mediastinum/rachid: No obvious mass or adenopathy. Skeleton: Bones are osteopenic. There are mild degenerative changes of the thoracic spine. There is no obvious fracture or dislocation. Soft tissues: Left breast soft tissues are smaller than those on the right suggestive of prior mastectomy. There are left lateral chest wall surgical clips. 2.4 x 2.3 cm right upper lobe mass. Recommend chest CT for further evaluation. 3 to 4 mm right basilar pulmonary nodule, possibly a calcified granuloma. Elevated right diaphragm with mild right basilar atelectasis. Minimal mid left lung atelectasis or scarring. No acute pneumonia. **This report has been created using voice recognition software. It may contain minor errors which are inherent in voice recognition technology. ** Final report electronically signed by Dr. Kiran Dutta on 1/3/2020 9:05 AM    Ct Head Wo Contrast    Result Date: 1/3/2020  PROCEDURE: CT HEAD WO CONTRAST CLINICAL INFORMATION: fall, incontinence. COMPARISON: None TECHNIQUE: Noncontrast 5 mm axial images were obtained through the brain. Sagittal and coronal reconstructions were obtained and reviewed.  All CT scans at this facility use dose modulation, iterative reconstruction, and/or weight-based dosing when appropriate to reduce radiation dose to as low as reasonably achievable. FINDINGS: Brain: There is no acute ischemic infarct, hemorrhage, midline shift, mass, or mass effect. There are mild deep white matter hypodensities, nonspecific in nature but probably representing small vessel chronic ischemic changes. There is age appropriate cortical atrophy. Ventricles/basal cisterns: The ventricles and cisterns are of appropriate size and configuration for the patient's age. No evidence of obstructive hydrocephalus. Vascular: There are atherosclerotic calcifications in the bilateral vertebral and cavernous internal carotid arteries. Skull base/calvarium/osseous structures: Unremarkable Soft tissues: Unremarkable Intraorbital contents: Unremarkable Sinuses: Unremarkable; the imaged sinuses are clear without evidence of mucosal thickening or fluid levels. Mastoids: Unremarkable; the mastoid air cells are clear. No acute ischemic infarct, hemorrhage, or mass effect. Aging changes as discussed above. **This report has been created using voice recognition software. It may contain minor errors which are inherent in voice recognition technology. ** Final report electronically signed by Dr. Darwin Guerrero on 1/3/2020 10:22 AM    Ct Chest W Contrast    Result Date: 1/3/2020  PROCEDURE: CT CHEST W CONTRAST CLINICAL INFORMATION: fall. COMPARISON: Chest x-ray earlier today TECHNIQUE: 5 mm axial images were obtained through the chest after the administration of intravenous contrast. Sagittal and coronal reconstructions were obtained. All CT scans at this facility use dose modulation, iterative reconstruction, and/or weight-based dosing when appropriate to reduce radiation dose to as low as reasonably achievable. FINDINGS: Lungs: There is a lobulated 2.3 x 3 cm lateral right upper lobe pulmonary mass consistent with primary lung carcinoma versus a metastasis; there is an additional 8 x 7 mm left upper lobe pulmonary nodule (axial image 26).  There is also a subtle 4 x 3 mm noncalcified nodule in the lateral right middle lobe (axial image 31). There are a few scattered calcified granulomas. There is bilateral multilobar atelectasis. There is no pneumonia. The trachea and major bronchi are unremarkable. Pleura: No pleural effusion. No pneumothorax. Diaphragm: There is moderate elevation of the right hemidiaphragm. Heart: Heart size is normal. There is no pericardial effusion. Isamar and mediastinum: There is no mass or adenopathy. There are calcified mediastinal and right hilar lymph nodes. There is a small hiatal hernia. Pulmonary vasculature: Unremarkable Thoracic aorta/vascular: No thoracic aortic aneurysm or dissection. The imaged portions of the brachiocephalic arteries are unremarkable. Imaged upper abdomen: See the accompanying abdomen pelvis CT report. Musculoskeletal system: There is multilevel cervical and thoracic degenerative disc disease and endplate spondylosis. There is a mild to moderate compression fracture of T2 of indeterminate age however there is an acute minimally displaced fracture of the anterior aspect of the vertebral body. This is better seen on the accompanying cervical spine CT. There is associated mild prevertebral edema or hemorrhage at this level. . There is an old mild compression fracture of T7. There is mild thoracic levoscoliosis. Chest/body wall soft tissues: There are postoperative changes of the left breast with multiple surgical clips and an adjacent 1.9 x 1.3 cm scar in the medial left breast. Thyroid: There is a 1.5 x 1.3 cm right thyroid nodule. Consider nonemergent ultrasound for further evaluation. Acute minimally displaced fracture of the anterior T2 vertebral body with an age indeterminate mild to moderate compression fracture component. Associated mild prevertebral edema or hemorrhage. 2.3 x 3 cm right upper lobe pulmonary mass consistent with primary lung carcinoma versus metastasis.  8 x 7 mm left upper lobe pulmonary nodule. No acute traumatic abnormality of the chest. No acute pneumonia. Bilateral multilobar atelectasis. 1.5 x 1.3 cm right thyroid nodule, consider nonemergent ultrasound for further evaluation. Postoperative changes of the left breast. Results discussed with Dr. Evaristo Garcia on 1/3/2020 at 10:40 AM. **This report has been created using voice recognition software. It may contain minor errors which are inherent in voice recognition technology. ** Final report electronically signed by Dr. Coral Cole on 1/3/2020 10:43 AM    Ct Cervical Spine Wo Contrast    Result Date: 1/3/2020  PROCEDURE: CT CERVICAL SPINE WO CONTRAST CLINICAL INFORMATION: trauma . COMPARISON: No prior study. TECHNIQUE: 2-D multiplanar noncontrast images of the cervical spine. Bone windows only. All CT scans at this facility use dose modulation, iterative reconstruction, and/or weight-based dosing when appropriate to reduce radiation dose to as low as reasonably achievable. FINDINGS: There is an acute anterior wedge compression fracture of T2. There is no retropulsion or central canal encroachment. There is a approximately 25% loss of vertebral body height. There is periureteral vertebral hematoma. There is no acute cervical spine fracture. There is disc space narrowing and degenerative change at every level is most severe at C5-6 and C6-7. Hypertrophic facet changes are present throughout and are greater on the left and the right. There is marked right neural foraminal narrowing at C5-6. No precervical soft tissue swelling. Inhomogeneous appearance of the thyroid with enlargement of the right thyroid lobe suggesting goiter. Case discussed with Dr. Marylen Bacca at the time of exam.     Acute anterior wedge compression fracture of T2 with no central canal encroachment. **This report has been created using voice recognition software. It may contain minor errors which are inherent in voice recognition technology. ** Final report electronically signed by  colon are otherwise unremarkable. There is no evidence of bowel wall thickening. No evidence of bowel obstruction. Appendix: Unremarkable. No findings to suggest acute appendicitis. Omentum and mesentery: Unremarkable Aorta, vascular: No aortic aneurysm or dissection. No significant vascular abnormality. Reproductive: The uterus is absent consistent with hysterectomy. The adnexal regions are unremarkable. There is a pessary in the vagina. Bladder: The wall of the urinary bladder is mildly thickened, most likely due to incomplete distension. No calcified stones. Intraperitoneal/retroperitoneal Space: There is no ascites, abscess, adenopathy, or mass. No pneumoperitoneum. Abdominal and pelvic body wall soft tissues: There is a tiny fat-containing umbilical hernia. Musculoskeletal structures: There are old mild compression fracture deformities of T7 and T8. There is no acute fracture. There is multilevel thoracolumbar degenerative disc disease with endplate spondylosis. There is multilevel lumbar facet joint DJD. There is grade 1 anterolisthesis of L4 on L5, likely degenerative in nature. No acute traumatic abnormality of the solid or hollow viscera of the abdomen and pelvis. No acute inflammatory or infectious process in the abdomen or pelvis. Colonic diverticulosis without diverticulitis. Severe pancreatic atrophy with marked diffuse pancreatic ductal dilatation. 2 subcentimeter nodular foci in the duct in the pancreatic tail. Neoplasm not excluded. Additional nonemergent findings as detailed above. **This report has been created using voice recognition software. It may contain minor errors which are inherent in voice recognition technology. ** Final report electronically signed by Dr. Elieser Yan on 1/3/2020 10:55 AM    Mri Abdomen W Wo Contrast Mrcp    Result Date: 1/4/2020  PROCEDURE: MRI ABDOMEN W WO CONTRAST MRCP CLINICAL INFORMATION: possible pancreatic mass & duct on CT history of breast cancer COMPARISON: CT 1/3/2020 TECHNIQUE: Routine MRI abdomen without and with IV contrast.  Routine MRCP was also performed. The MRCP examination includes 3D reconstructions of the biliary tree and the pancreatic duct. CONTRAST: 10 cc ProHance FINDINGS: LIVER: The liver is normal size. The hepatic parenchymal signal is normal. 4 mm probable cyst near the anterior branch of the right portal vein. There is no intrahepatic biliary dilatation. There is normal enhancement of the portal and the hepatic veins. There is normal enhancement of the portal venous confluence, SMV, and splenic vein. GALLBLADDER: The gallbladder is filled with multiple small stones. No wall thickening or pericholecystic fluid. BILIARY TREE: The common duct is normal size measuring 4 mm There is no gross choledocholithiasis. PANCREAS: The pancreas is markedly atrophied. The pancreatic duct is moderately prominent measuring up to 8 mm particularly within the tail of the pancreas. No gross mass is suggested. There is no peripancreatic fluid or inflammation. SPLEEN:  No mass or enlargement. ADRENAL GLANDS: No mass or enlargement. KIDNEYS: No mass or obstruction. BOWEL: Nonobstructive. FREE AIR/FREE FLUID/INFLAMMATION: None. LYMPHADENOPATHY: There are no pathologically enlarged lymph nodes. ABDOMINAL AORTA/IVC: Unremarkable. LUNG BASES: Unremarkable. MUSCULOSKELETAL: Unremarkable. OTHER: None. A gross pancreatic mass is not seen. There is pancreatic atrophy as well as moderate ductal dilatation. Multiple tiny gallstones are noted **This report has been created using voice recognition software. It may contain minor errors which are inherent in voice recognition technology. ** Final report electronically signed by Dr. Delmer Tipton on 1/4/2020 2:00 PM    Ct Lumbar Reconstruction Wo Post Process    Result Date: 1/3/2020  PROCEDURE: CT LUMBAR RECONSTRUCTION WO POST PROCESS CLINICAL INFORMATION: pain. Rib and back pain.  COMPARISON: Lumbar spine radiographs hypertrophy and ligamentum flavum thickening. 1. No evidence of acute osseous injury of the lumbar spine. 2. Multilevel degenerative changes with areas of moderate to severe spinal canal stenosis at L3-4 and L4-5 and up to moderate to severe neuroforaminal stenosis. **This report has been created using voice recognition software. It may contain minor errors which are inherent in voice recognition technology. ** Final report electronically signed by Dr. Vance Engle MD on 1/3/2020 10:32 AM    Ct Thoracic Reconstruction Wo Post Process    Result Date: 1/3/2020  PROCEDURE: CT THORACIC RECONSTRUCTION WO POST PROCESS CLINICAL INFORMATION: T2 compression fracture seen on Cervical spine CT. History of breast cancer. COMPARISON: CT of the cervical spine from the same date. TECHNIQUE: Axial, sagittal and coronal reformatted images of the thoracic spine reconstructed from the patient's chest CT. IV contrast is present. All CT scans at this facility use dose modulation, iterative reconstruction, and/or weight-based dosing when appropriate to reduce radiation dose to as low as reasonably achievable. FINDINGS: Redemonstration of an anterior wedge shape configuration of the T2 vertebral body with approximately 30% anterior height loss with an acute fracture lucency at the anterior aspect of the body. The posterior elements appear intact. There is a mild anterior wedge shape configuration of the T7 vertebral body with approximately 20% anterior height loss without definite acute fracture lucency. There is an exaggerated thoracic kyphosis. There is a levocurvature of the thoracic spine. There is otherwise  anatomic vertebral body height and alignment. No definite focal lytic or sclerotic lesion is identified within the thoracic spine. Visualized portions of the ribs appear intact. There is vacuum disc phenomenon at T6-7 through T11-12 with disc space narrowing at these levels.  There is endplate sclerosis and mild osteophytosis at T9-10 through T12-L1 anteriorly. There is no significant spinal canal or neuroforaminal stenosis at any thoracic level. 1. Redemonstration of acute compression deformity of T2 with approximately 30% anterior height loss. No definite underlying focal lesion is identified although MRI is much more sensitive for evaluation of metastatic disease. 2. Chronic appearing mild compression deformity of the T7 vertebral body with approximately 20% anterior height loss. 3. Mild degenerative changes of the thoracic spine without significant spinal canal or neuroforaminal stenosis at any thoracic level. **This report has been created using voice recognition software. It may contain minor errors which are inherent in voice recognition technology. ** Final report electronically signed by Dr. Roxane Leon MD on 1/3/2020 3:43 PM      Electronically signed by JAYSON Richardson CNP on 1/4/2020 at 2:43 PM

## 2020-01-04 NOTE — PROGRESS NOTES
Gastroenterology Progress Note:     Patient Name:  Nabor Almeida   MRN: [id number]  [id number]  YOB: 1934  Admit Date: 1/3/2020  7:51 AM  Primary Care Physician: Justyna Chaudhry MD   [id number]     Patient seen and examined. 24 hours events and chart reviewed. Patient awake and alert daughter-in-law at bedside. She denies any abdominal pain, nausea or vomiting      Review of Systems  Neuro- negative for headache, dizziness, altered level of consciousness  Cardiovascular- negative for CP, SOB, peripheral edema, orthopnea      ( #  )Medications Reviewed ;(   )Old records reviewed    No intake/output data recorded. I/O this shift:  In: 240 [P.O.:240]  Out: -     Diet:  DIET GENERAL; No Added Salt (3-4 GM)      BP (!) 112/48   Pulse 80   Temp 97.8 °F (36.6 °C) (Oral)   Resp 18   Ht 5' 3\" (1.6 m)   Wt 150 lb (68 kg)   SpO2 94%   BMI 26.57 kg/m²     Physical Exam:    General:  Nourished in no distress  CV: Heart RRR with s1 s2 heard, no murmurs, rubs, gallops. Resp: Even, easy without cough or accessory use. Lungs clear to ascultation bilaterally. Abd: Round, soft, nontender. No hepatosplenomegaly or mass present. Active bowel sounds heard. No distention noted. Ext:  Without cyanosis, clubbing, edema. Skin: Pink, warm, dry  Neuro:  Alert, oriented x3 with no obvious deficits.        Rectal: deferred  Lines/tubes:       Labs: WBC:    Lab Results   Component Value Date    WBC 8.2 01/04/2020     Platelets:    Lab Results   Component Value Date     01/04/2020     Hemoglobin/Hematocrit:    Lab Results   Component Value Date    HGB 12.2 01/04/2020    HCT 38.2 01/04/2020     BMP:    Lab Results   Component Value Date     01/03/2020    K 4.1 01/03/2020    CL 97 01/03/2020    CO2 26 01/03/2020    BUN 15 01/03/2020    LABALBU 3.8 01/03/2020    CREATININE 0.8 01/03/2020    CALCIUM 9.7 01/03/2020    LABGLOM 68 01/03/2020    GLUCOSE 314 01/03/2020    GLUCOSE 186 04/10/2018     Hepatic Function Panel:    Lab Results   Component Value Date    ALKPHOS 171 01/03/2020    ALT 19 01/03/2020    AST 16 01/03/2020    PROT 7.4 01/03/2020    BILITOT 0.3 01/03/2020    LABALBU 3.8 01/03/2020     Calcium:    Lab Results   Component Value Date    CALCIUM 9.7 01/03/2020     PT/INR:  No results found for: PROTIME, INR  PTT:  No results found for: APTT, PTT[APTT     Significant Diagnostic Studies:     Current Meds:  Scheduled Meds:   lidocaine  1 patch Transdermal Daily    metFORMIN  500 mg Oral BID     levothyroxine  88 mcg Oral Daily    amitriptyline  25 mg Oral Nightly    pravastatin  20 mg Oral Nightly    doxycycline hyclate  50 mg Oral Daily    calcium elemental  500 mg Oral Daily    docusate sodium  100 mg Oral BID    venlafaxine  75 mg Oral Daily    ramipril  2.5 mg Oral Daily    metoprolol succinate  25 mg Oral Daily    aspirin  81 mg Oral Daily    acetaminophen  650 mg Oral Nightly    sodium chloride flush  10 mL Intravenous 2 times per day    enoxaparin  40 mg Subcutaneous Daily    insulin lispro  0-6 Units Subcutaneous TID     insulin lispro  0-3 Units Subcutaneous Nightly     Continuous Infusions:   dextrose       PRN Meds:.acetaminophen, traMADol, sodium chloride flush, magnesium hydroxide, ondansetron, glucose, dextrose, glucagon (rDNA), dextrose     Radiology:   CT chest 01/03/20      Impression   Acute minimally displaced fracture of the anterior T2 vertebral body with an age indeterminate mild to moderate compression fracture component. Associated mild prevertebral edema or hemorrhage.    2.3 x 3 cm right upper lobe pulmonary mass consistent with primary lung carcinoma versus metastasis. 8 x 7 mm left upper lobe pulmonary nodule. No acute traumatic abnormality of the chest.   No acute pneumonia. Bilateral multilobar atelectasis. 1.5 x 1.3 cm right thyroid nodule, consider nonemergent ultrasound for further evaluation.    Postoperative changes of the left breast.      CT outpatient  [id number]  Oncology on board planning for CT biopsy of lung mass   Work-up for dilated pancreatic duct can be done outpatient.   Patient to follow-up with [facility] in 1 week and arrangements can be made discussed with patient and daughter-in-law in the room  Supportive care per primary  GI to sign off      Case reviewed and impression/plan reviewed in collaboration with Dr. Delores Lou, CNP for Dr. Radha Gonzalez   1/4/2020

## 2020-01-04 NOTE — CONSULTS
[address] [place] [address]                                  CONSULTATION    PATIENT NAME: Thom Mcnulty                   :        1934  MED REC NO:   [id number]                           ROOM:       0009  ACCOUNT NO:   [de-identified]                           ADMIT DATE: 2020  PROVIDER:     Jacy Flaherty PA-C    CONSULT DATE:  2020    We are consulted by the hospital medicine service for evaluation of a T2  compression fracture. CHIEF COMPLAINT:  Upper back pain and upper abdominal pain. HISTORY OF PRESENT ILLNESS:  The patient is an 66-year-old female who  presented to the emergency department yesterday, 2020, with  complaints of significant right lower chest wall pain with right upper  quadrant pain. She was admitted into the hospital medicine service for  this pain. She was worked up with multiple scans and thoracic imaging  did show an acute minimally displaced fracture of the anterior T2  vertebral body. She has also been worked up for a suspicious lung  nodule, possible for cancer. She does have a history of breast cancer  which was treated in 2016. She is also being worked up for pancreatic  ductal dilation and nodule _____ duct and tail. We are consulted for  evaluation and treatment of her T2 compression fracture. The patient  does report that several days ago on  she fell backwards landing  flat on her back and at that time had a severe increase in upper back  pain which radiated across the shoulders. Now, she notes varying  degrees of pain on a day-to-day basis, but does feel in general that she  has improved in terms of her upper back pain. She complains mostly of  abdominal upper quadrant pain. She is here in the hospital today with  her two daughters. She has already been worked up with an MRCP and they  are awaiting those results.   She has a planned CT biopsy of the lung  nodule coming up. There is obviously significant concern for cancer and  metastatic lesions throughout these areas and for that reason, there is  also concern for metastasis to the T2 body, possibly from the lung  nodule, causing this to be a pathologic fracture. She denies any loss  of sensation in the lower extremities or loss of strength. ASSESSMENT:  T2 compression fracture with concern for possible  metastatic component. PLAN:  Both Dr. Triston Howard and myself have seen the patient and discussed  with both of her daughters her findings. We did discuss that it is very  common for people of her age to sustain compression fractures which may  have nothing to do with any type of cancerous lesion at all given the  recent history of a fall; however, due to the fact that there is  concurrent workup for a cancerous lesion, it would be beneficial to have  a biopsy of this T2 vertebral body to identify if there are any  malignant cells. Dr. Triston Howard has recommended that we consult Dr. Jeyson Morales for evaluation and treatment for the patient which include  a T2 bone biopsy as well as possible augmentation versus cold therapy  ablation of this T2 vertebral body. We will make this contact with Dr. Jenny Stevens and allow him to resume care as at this time she does not  require any surgical management from an orthopedic spine standpoint. We  will continue to follow along if needed. PHYSICAL EXAMINATION:  VITAL SIGNS:  Most recent vital signs show temperature 97.8 degrees,  respirations 18, pulse 80, blood pressure 112/48, and SpO2 94%. GENERAL:  The patient is resting in bed. She is awake, alert, and  oriented x3. She is in no acute distress. MUSCULOSKELETAL:  Examination of the cervicothoracic spine does show  some tenderness to palpation in the midline of the upper thoracic spine. She also complains of tenderness on the right paraspinal musculature. There are no obvious wounds or deformities.   There is no

## 2020-01-04 NOTE — PROGRESS NOTES
Explained patients right to have family, representative or physician notified of their admission. Patient has Declined for physician to be notified. Patient has Declined for family/representative to be notified.

## 2020-01-05 LAB
GLUCOSE BLD-MCNC: 174 MG/DL (ref 70–108)
GLUCOSE BLD-MCNC: 209 MG/DL (ref 70–108)
GLUCOSE BLD-MCNC: 233 MG/DL (ref 70–108)
GLUCOSE BLD-MCNC: 258 MG/DL (ref 70–108)
URINE CULTURE, ROUTINE: NORMAL

## 2020-01-05 PROCEDURE — 2709999900 HC NON-CHARGEABLE SUPPLY

## 2020-01-05 PROCEDURE — 6370000000 HC RX 637 (ALT 250 FOR IP): Performed by: FAMILY MEDICINE

## 2020-01-05 PROCEDURE — 2580000003 HC RX 258: Performed by: FAMILY MEDICINE

## 2020-01-05 PROCEDURE — 6360000002 HC RX W HCPCS: Performed by: FAMILY MEDICINE

## 2020-01-05 PROCEDURE — 82948 REAGENT STRIP/BLOOD GLUCOSE: CPT

## 2020-01-05 PROCEDURE — 6370000000 HC RX 637 (ALT 250 FOR IP): Performed by: NURSE PRACTITIONER

## 2020-01-05 PROCEDURE — 1200000000 HC SEMI PRIVATE

## 2020-01-05 PROCEDURE — 99232 SBSQ HOSP IP/OBS MODERATE 35: CPT | Performed by: NURSE PRACTITIONER

## 2020-01-05 RX ADMIN — METFORMIN HYDROCHLORIDE 500 MG: 500 TABLET ORAL at 16:55

## 2020-01-05 RX ADMIN — INSULIN LISPRO 2 UNITS: 100 INJECTION, SOLUTION INTRAVENOUS; SUBCUTANEOUS at 13:29

## 2020-01-05 RX ADMIN — DOXYCYCLINE HYCLATE 50 MG: 100 TABLET, COATED ORAL at 09:08

## 2020-01-05 RX ADMIN — PRAVASTATIN SODIUM 20 MG: 20 TABLET ORAL at 20:45

## 2020-01-05 RX ADMIN — LEVOTHYROXINE SODIUM 88 MCG: 88 TABLET ORAL at 09:08

## 2020-01-05 RX ADMIN — ACETAMINOPHEN 650 MG: 325 TABLET ORAL at 20:45

## 2020-01-05 RX ADMIN — VENLAFAXINE 75 MG: 37.5 TABLET ORAL at 09:08

## 2020-01-05 RX ADMIN — INSULIN LISPRO 1 UNITS: 100 INJECTION, SOLUTION INTRAVENOUS; SUBCUTANEOUS at 18:53

## 2020-01-05 RX ADMIN — RAMIPRIL 2.5 MG: 2.5 CAPSULE ORAL at 09:07

## 2020-01-05 RX ADMIN — METOPROLOL SUCCINATE 25 MG: 25 TABLET, FILM COATED, EXTENDED RELEASE ORAL at 09:08

## 2020-01-05 RX ADMIN — AMITRIPTYLINE HYDROCHLORIDE 25 MG: 25 TABLET, FILM COATED ORAL at 20:46

## 2020-01-05 RX ADMIN — DOCUSATE SODIUM 100 MG: 100 CAPSULE, LIQUID FILLED ORAL at 20:46

## 2020-01-05 RX ADMIN — Medication 10 ML: at 09:08

## 2020-01-05 RX ADMIN — ENOXAPARIN SODIUM 40 MG: 40 INJECTION SUBCUTANEOUS at 09:08

## 2020-01-05 RX ADMIN — METFORMIN HYDROCHLORIDE 500 MG: 500 TABLET ORAL at 09:08

## 2020-01-05 RX ADMIN — DOCUSATE SODIUM 100 MG: 100 CAPSULE, LIQUID FILLED ORAL at 09:08

## 2020-01-05 RX ADMIN — INSULIN LISPRO 1 UNITS: 100 INJECTION, SOLUTION INTRAVENOUS; SUBCUTANEOUS at 23:07

## 2020-01-05 RX ADMIN — ENOXAPARIN SODIUM 40 MG: 40 INJECTION SUBCUTANEOUS at 20:45

## 2020-01-05 RX ADMIN — CALCIUM 500 MG: 500 TABLET ORAL at 09:07

## 2020-01-05 ASSESSMENT — PAIN SCALES - GENERAL
PAINLEVEL_OUTOF10: 0
PAINLEVEL_OUTOF10: 0

## 2020-01-05 NOTE — PROGRESS NOTES
Hospitalist Progress Note      Patient:  Leopoldo Broach Schoffner    Unit/Bed:8A-09/009-A  YOB: 1934  MRN: [id number]   Acct: [de-identified]   PCP: Timur Mancini MD  Date of Admission: 1/3/2020    Assessment/Plan:    1. Intractable right lower chest wall pain with RUQ pain: CT abdomen/pelvis demonstrated  severe pancreatic atrophy with marked diffuse pancreatic ductal dilatation. 2 subcentimeter nodular foci in the duct in the pancreatic tail. Neoplasm not excluded. MRCP conducted on 1/4/2020 reports pancreatic atrophy as well as moderate ductal dilation. Multiple tiny gallstones are noted. May use tylenol 650 q6h PRN for moderate pain and tramadol 50 mg q12h for severe pain. GI and oncology following, appreciate recommendations. 2. Type 2 diabetes mellitus, uncontrolled: Current hemoglobin A1c 10.2. Accu-Cheks before meals and at bedtime with sliding scale insulin coverage (low-dose corrective algorithm). Continue carb controlled diet. Continue metformin. 3. Acute minimally displaced fracture of the anterior T2 vertebral body with an age indeterminate mild to moderate compression fracture component: Orthopedic surgery following. 4. 2.3x3 cm right upper lobe pulmonary mass consistent with primary lung carcinoma versus metastasis: Oncology following. 5. 8x7 mm left upper lobe pulmonary nodule: Oncology following. 6. 1.5x1.3 cm right thyroid nodule: Outpatient ultrasound will need to be conducted. 7. History of left breast cancer status post lumpectomy and radiation therapy completed 2018: Oncology following. 8. Urinary incontinence: Relatively new onset. No saddle anesthesia, radiculopathy, lower back pain, or bowel incontinence, monitor closely with I&O's. Attempt Keagle exercises. We will continue to monitor. 9. Hypothyroidism: Stable, continue Synthroid. 10. Hypertension: Stable, continue metoprolol.   Cardiac, no added salt diet.  11. Hyperlipidemia: Continue pravastatin. 12. Slight ALP elevation: Likely secondary to gallstones. 13. Chronic kidney disease stage II: Stable. Current creatinine 0.8. Avoid nephrotoxic agents. Chief Complaint: Intractable pain    Initial H and P:-    Kate Celestin is an 49-year-old  female, lifetime non-smoker, with a medical history of spinal stenosis, hypothyroidism, hypertension, hyperlipidemia, diabetes mellitus, depression, cancer of the left breast, and arthritis. Patient presents [facility] ER for a reevaluation of prior complaints/symptoms that resulted from a fall taking place on 12/21/2019. The patient was at a [employer] party, standing in the kitchen, when she stepped backwards and lost her footing, falling over a chair. The patient stated that she fell flat on her back and hit her head on the patio door but denied any loss of consciousness. The fall was witnessed by family and she was helped up and has been ambulatory ever since. The patient continues to have lower back pain as well as right rib pain that has progressively enhanced since the fall. She is also developed urinary incontinence over the past week or so which is not normal for her. She does however deny any bowel incontinence. She denied any paresthesia or any other genitourinary symptoms leading up to the incontinence. The patient also reports urgency. She did however deny any chest pain, abdominal pain, shortness of breath, weakness, tingling, numbness after the fall. The patient denies any headache, vision changes, lightheadedness, dizziness after hitting her head. She does report that the fall was strictly mechanical and was asymptomatic prior to the fall. The hospitalist service was contacted for further evaluation, treatment, management. Subjective (past 24 hours):   Patient resting in bed at the time of the interview.   She is alert and oriented to person, place, time, and enoxaparin  40 mg Subcutaneous Daily     PRN Meds: acetaminophen, traMADol, sodium chloride flush, magnesium hydroxide, ondansetron, glucose, dextrose, glucagon (rDNA), dextrose      Intake/Output Summary (Last 24 hours) at 1/5/2020 0909  Last data filed at 1/5/2020 0908  Gross per 24 hour   Intake 320 ml   Output 100 ml   Net 220 ml       Diet:  DIET GENERAL; Carb Control: 3 carb choices (45 gms)/meal; No Added Salt (3-4 GM)    Exam:  /86   Pulse 76   Temp 97.9 °F (36.6 °C) (Oral)   Resp 16   Ht 5' 3\" (1.6 m)   Wt 150 lb (68 kg)   SpO2 95%   BMI 26.57 kg/m²     General appearance: Alert and appropriate, pleasant geriatric female. No apparent distress, appears stated age and cooperative. HEENT: Pupils equal, round, and reactive to light. Conjunctivae/corneas clear. Neck: Supple, with full range of motion. No jugular venous distention. Trachea midline. Respiratory:  Normal respiratory effort. Clear to auscultation, bilaterally without Rales/Wheezes/Rhonchi. Cardiovascular: Regular rate and rhythm with normal S1/S2 without murmurs, rubs or gallops. Abdomen: Soft, non-tender, non-distended with normal bowel sounds. Musculoskeletal: Passive and active ROM x 4 extremities. Skin: Skin color, texture, turgor normal.  No rashes or lesions. Neurologic:  Neurovascularly intact without any focal sensory/motor deficits. Cranial nerves: II-XII intact, grossly non-focal.  Psychiatric: Alert and oriented to person, place, time, and situation.  Thought content appropriate, normal insight  Capillary Refill: Brisk,< 3 seconds   Peripheral Pulses: +2 palpable, equal bilaterally     Labs:   Recent Labs     01/03/20  0830 01/04/20  0320   WBC 7.8 8.2   HGB 13.2 12.2   HCT 39.6 38.2    346     Recent Labs     01/03/20  0830      K 4.1   CL 97*   CO2 26   BUN 15   CREATININE 0.8   CALCIUM 9.7     Recent Labs     01/03/20  0830   AST 16   ALT 19   BILITOT 0.3   ALKPHOS 171*     No results for input(s): INR in the last 72 hours. Recent Labs     01/03/20  1248   CKTOTAL 148*       Microbiology:    Blood culture #1: No results found for: BC    Blood culture #2:No results found for: Estefany Corn    Organism:No results found for: ORG    No results found for: LABGRAM    MRSA culture only:No results found for: [facility]    Urine culture:   Lab Results   Component Value Date    LABURIN No growth-preliminary No growth  01/03/2020       Respiratory culture: No results found for: CULTRESP    Aerobic and Anaerobic :  No results found for: LABAERO  No results found for: LABANAE    Urinalysis:      Lab Results   Component Value Date    NITRU NEGATIVE 01/03/2020    WBCUA 0-2 01/03/2020    BACTERIA NONE SEEN 01/03/2020    RBCUA 0-2 01/03/2020    BLOODU NEGATIVE 01/03/2020    SPECGRAV >1.030 01/03/2020       Radiology:  MRI Abdomen W WO Contrast Mrcp   Final Result      A gross pancreatic mass is not seen. There is pancreatic atrophy as well as moderate ductal dilatation. Multiple tiny gallstones are noted      **This report has been created using voice recognition software. It may contain minor errors which are inherent in voice recognition technology. **      Final report electronically signed by Dr. Dylon Taylor on 1/4/2020 2:00 PM      CT THORACIC RECONSTRUCTION WO POST PROCESS   Final Result      1. Redemonstration of acute compression deformity of T2 with approximately 30% anterior height loss. No definite underlying focal lesion is identified although MRI is much more sensitive for evaluation of metastatic disease. 2. Chronic appearing mild compression deformity of the T7 vertebral body with approximately 20% anterior height loss. 3. Mild degenerative changes of the thoracic spine without significant spinal canal or neuroforaminal stenosis at any thoracic level. **This report has been created using voice recognition software. It may contain minor errors which are inherent in voice recognition technology. ** software. It may contain minor errors which are inherent in voice recognition technology. **      Final report electronically signed by Dr. Silva Solano MD on 1/3/2020 10:32 AM      CT CHEST W CONTRAST   Final Result   Acute minimally displaced fracture of the anterior T2 vertebral body with an age indeterminate mild to moderate compression fracture component. Associated mild prevertebral edema or hemorrhage. 2.3 x 3 cm right upper lobe pulmonary mass consistent with primary lung carcinoma versus metastasis. 8 x 7 mm left upper lobe pulmonary nodule. No acute traumatic abnormality of the chest.   No acute pneumonia. Bilateral multilobar atelectasis. 1.5 x 1.3 cm right thyroid nodule, consider nonemergent ultrasound for further evaluation. Postoperative changes of the left breast.      Results discussed with Dr. Orin Mcneal on 1/3/2020 at 10:40 AM.      **This report has been created using voice recognition software. It may contain minor errors which are inherent in voice recognition technology. **      Final report electronically signed by Dr. Roberto Angulo on 1/3/2020 10:43 AM      XR CHEST STANDARD (2 VW)   Final Result      2.4 x 2.3 cm right upper lobe mass. Recommend chest CT for further evaluation. 3 to 4 mm right basilar pulmonary nodule, possibly a calcified granuloma. Elevated right diaphragm with mild right basilar atelectasis. Minimal mid left lung atelectasis or scarring. No acute pneumonia. **This report has been created using voice recognition software. It may contain minor errors which are inherent in voice recognition technology. **      Final report electronically signed by Dr. Roberto Angulo on 1/3/2020 9:05 AM      CT NEEDLE BIOPSY LUNG/MEDIASTINUM PERCUTANEOUS    (Results Pending)     Xr Chest Standard (2 Vw)    Result Date: 1/3/2020  PROCEDURE: XR CHEST (2 VW) CLINICAL INFORMATION: trauma, fall R lateral rib pain, urinary incontinence. COMPARISON: No prior study. hypodensities, nonspecific in nature but probably representing small vessel chronic ischemic changes. There is age appropriate cortical atrophy. Ventricles/basal cisterns: The ventricles and cisterns are of appropriate size and configuration for the patient's age. No evidence of obstructive hydrocephalus. Vascular: There are atherosclerotic calcifications in the bilateral vertebral and cavernous internal carotid arteries. Skull base/calvarium/osseous structures: Unremarkable Soft tissues: Unremarkable Intraorbital contents: Unremarkable Sinuses: Unremarkable; the imaged sinuses are clear without evidence of mucosal thickening or fluid levels. Mastoids: Unremarkable; the mastoid air cells are clear. No acute ischemic infarct, hemorrhage, or mass effect. Aging changes as discussed above. **This report has been created using voice recognition software. It may contain minor errors which are inherent in voice recognition technology. ** Final report electronically signed by Dr. Wendy Martinez on 1/3/2020 10:22 AM    Ct Chest W Contrast    Result Date: 1/3/2020  PROCEDURE: CT CHEST W CONTRAST CLINICAL INFORMATION: fall. COMPARISON: Chest x-ray earlier today TECHNIQUE: 5 mm axial images were obtained through the chest after the administration of intravenous contrast. Sagittal and coronal reconstructions were obtained. All CT scans at this facility use dose modulation, iterative reconstruction, and/or weight-based dosing when appropriate to reduce radiation dose to as low as reasonably achievable. FINDINGS: Lungs: There is a lobulated 2.3 x 3 cm lateral right upper lobe pulmonary mass consistent with primary lung carcinoma versus a metastasis; there is an additional 8 x 7 mm left upper lobe pulmonary nodule (axial image 26). There is also a subtle 4 x 3 mm noncalcified nodule in the lateral right middle lobe (axial image 31). There are a few scattered calcified granulomas. There is bilateral multilobar atelectasis.  There is no pneumonia. The trachea and major bronchi are unremarkable. Pleura: No pleural effusion. No pneumothorax. Diaphragm: There is moderate elevation of the right hemidiaphragm. Heart: Heart size is normal. There is no pericardial effusion. Isamar and mediastinum: There is no mass or adenopathy. There are calcified mediastinal and right hilar lymph nodes. There is a small hiatal hernia. Pulmonary vasculature: Unremarkable Thoracic aorta/vascular: No thoracic aortic aneurysm or dissection. The imaged portions of the brachiocephalic arteries are unremarkable. Imaged upper abdomen: See the accompanying abdomen pelvis CT report. Musculoskeletal system: There is multilevel cervical and thoracic degenerative disc disease and endplate spondylosis. There is a mild to moderate compression fracture of T2 of indeterminate age however there is an acute minimally displaced fracture of the anterior aspect of the vertebral body. This is better seen on the accompanying cervical spine CT. There is associated mild prevertebral edema or hemorrhage at this level. . There is an old mild compression fracture of T7. There is mild thoracic levoscoliosis. Chest/body wall soft tissues: There are postoperative changes of the left breast with multiple surgical clips and an adjacent 1.9 x 1.3 cm scar in the medial left breast. Thyroid: There is a 1.5 x 1.3 cm right thyroid nodule. Consider nonemergent ultrasound for further evaluation. Acute minimally displaced fracture of the anterior T2 vertebral body with an age indeterminate mild to moderate compression fracture component. Associated mild prevertebral edema or hemorrhage. 2.3 x 3 cm right upper lobe pulmonary mass consistent with primary lung carcinoma versus metastasis. 8 x 7 mm left upper lobe pulmonary nodule. No acute traumatic abnormality of the chest. No acute pneumonia. Bilateral multilobar atelectasis.  1.5 x 1.3 cm right thyroid nodule, consider nonemergent ultrasound for further evaluation. Postoperative changes of the left breast. Results discussed with Dr. Evaristo Garcia on 1/3/2020 at 10:40 AM. **This report has been created using voice recognition software. It may contain minor errors which are inherent in voice recognition technology. ** Final report electronically signed by Dr. Coral Cole on 1/3/2020 10:43 AM    Ct Cervical Spine Wo Contrast    Result Date: 1/3/2020  PROCEDURE: CT CERVICAL SPINE WO CONTRAST CLINICAL INFORMATION: trauma . COMPARISON: No prior study. TECHNIQUE: 2-D multiplanar noncontrast images of the cervical spine. Bone windows only. All CT scans at this facility use dose modulation, iterative reconstruction, and/or weight-based dosing when appropriate to reduce radiation dose to as low as reasonably achievable. FINDINGS: There is an acute anterior wedge compression fracture of T2. There is no retropulsion or central canal encroachment. There is a approximately 25% loss of vertebral body height. There is periureteral vertebral hematoma. There is no acute cervical spine fracture. There is disc space narrowing and degenerative change at every level is most severe at C5-6 and C6-7. Hypertrophic facet changes are present throughout and are greater on the left and the right. There is marked right neural foraminal narrowing at C5-6. No precervical soft tissue swelling. Inhomogeneous appearance of the thyroid with enlargement of the right thyroid lobe suggesting goiter. Case discussed with Dr. Marylen Bacca at the time of exam.     Acute anterior wedge compression fracture of T2 with no central canal encroachment. **This report has been created using voice recognition software. It may contain minor errors which are inherent in voice recognition technology. ** Final report electronically signed by Dr. Margarita Trevino on 1/3/2020 10:23 AM    Ct Abdomen Pelvis W Iv Contrast Additional Contrast? None    Result Date: 1/3/2020  PROCEDURE: CT ABDOMEN PELVIS W IV CONTRAST CLINICAL INFORMATION: fall, R lateral rib pain, urinary incontinence, please recon spine . COMPARISON: None. TECHNIQUE: 5 mm axial CT images were obtained through the abdomen and pelvis after the administration of intravenous contrast. Coronal and sagittal reconstructions were obtained. All CT scans at this facility use dose modulation, iterative reconstruction, and/or weight-based dosing when appropriate to reduce radiation dose to as low as reasonably achievable. FINDINGS: Lower chest: See the accompanying chest CT report. Liver: There are scattered calcified granulomas. There is a 4 mm round hypodense lesion in the anterior right lobe of the liver, too small to characterize but possibly a cyst.. There is no liver mass or intrahepatic biliary dilatation. Gallbladder/Biliary tree: Unremarkable. No calcified gallstones. No extrahepatic biliary dilatation. Spleen: There are calcified granulomas. No splenomegaly. Pancreas: There is severe pancreatic atrophy with diffuse severe dilatation of the pancreatic duct. No obstructing process is identified. There are 2 subcentimeter nodular foci in the pancreatic duct in the tail (sagittal image 26, coronal image 27). Neoplasm not excluded. There are no findings to suggest acute pancreatitis. Adrenal glands: There is thickening of the limbs of the adrenal glands. No mass. Kidneys and ureters: There is a 1.2 x 0.9 cm ovoid hypodense lesion in the extreme upper pole of the left kidney, likely a cyst. There is bilateral renal scarring. No hydroureteronephrosis. No renal or ureteral calculi. No findings to suggest acute pyelonephritis. Stomach, small bowel, and colon: There is a small hiatal hernia. Stomach and duodenum are otherwise unremarkable. There is colonic diverticulosis without diverticulitis. Small bowel and colon are otherwise unremarkable. There is no evidence of bowel wall thickening. No evidence of bowel obstruction. Appendix: Unremarkable. No findings to suggest acute appendicitis. Omentum and mesentery: Unremarkable Aorta, vascular: No aortic aneurysm or dissection. No significant vascular abnormality. Reproductive: The uterus is absent consistent with hysterectomy. The adnexal regions are unremarkable. There is a pessary in the vagina. Bladder: The wall of the urinary bladder is mildly thickened, most likely due to incomplete distension. No calcified stones. Intraperitoneal/retroperitoneal Space: There is no ascites, abscess, adenopathy, or mass. No pneumoperitoneum. Abdominal and pelvic body wall soft tissues: There is a tiny fat-containing umbilical hernia. Musculoskeletal structures: There are old mild compression fracture deformities of T7 and T8. There is no acute fracture. There is multilevel thoracolumbar degenerative disc disease with endplate spondylosis. There is multilevel lumbar facet joint DJD. There is grade 1 anterolisthesis of L4 on L5, likely degenerative in nature. No acute traumatic abnormality of the solid or hollow viscera of the abdomen and pelvis. No acute inflammatory or infectious process in the abdomen or pelvis. Colonic diverticulosis without diverticulitis. Severe pancreatic atrophy with marked diffuse pancreatic ductal dilatation. 2 subcentimeter nodular foci in the duct in the pancreatic tail. Neoplasm not excluded. Additional nonemergent findings as detailed above. **This report has been created using voice recognition software. It may contain minor errors which are inherent in voice recognition technology. ** Final report electronically signed by Dr. Ajay Flores on 1/3/2020 10:55 AM    Mri Abdomen W Wo Contrast Mrcp    Result Date: 1/4/2020  PROCEDURE: MRI ABDOMEN W WO CONTRAST MRCP CLINICAL INFORMATION: possible pancreatic mass & duct on CT history of breast cancer COMPARISON: CT 1/3/2020 TECHNIQUE: Routine MRI abdomen without and with IV contrast.  Routine MRCP was also performed.  The MRCP examination includes 3D reconstructions of the biliary tree and this facility use dose modulation, iterative reconstruction, and/or weight-based dosing when appropriate to reduce radiation dose to as low as reasonably achievable. FINDINGS: There is grade 1 anterolisthesis of L4 relative to L5 on the basis of degenerative change, stable compared to prior radiographs. There is otherwise anatomic vertebral body height and alignment. No fracture of the lumbar vertebral column is identified. There is mild osteophytosis at the sacral iliac joints bilaterally. There is disc space narrowing and vacuum disc phenomenon at T8-9 through T11-12 with associated anterior osteophytes. At T12-L1 there is no significant spinal canal or neuroforaminal stenosis. At L1-2 there is a shallow disc bulge without significant spinal canal stenosis and moderate bilateral neural foraminal stenosis in association with mild facet hypertrophy. At L2-3 there is a shallow disc bulge with facet hypertrophy and ligamentum flavum thickening which causes moderate spinal canal stenosis. There is also moderate bilateral foraminal stenosis at this level. At L3-4 there is a shallow disc bulge with prominent facet hypertrophy and ligamentum flavum thickening which causes moderate to severe spinal canal stenosis and moderate to severe left and moderate right neuroforaminal stenosis. At L4-5 there is pronounced facet hypertrophy and ligament flavum thickening causing the anterolisthesis of L4 relative to L5 with partial uncovering the disc and superimposed disc bulge contributing to moderate to severe spinal canal stenosis and mild to moderate left and moderate right neural foraminal stenosis. At L5-S1 there is no significant spinal canal stenosis. There is mild to moderate bilateral neural foraminal stenosis in association with facet hypertrophy and ligamentum flavum thickening. 1. No evidence of acute osseous injury of the lumbar spine.  2. Multilevel degenerative changes with areas of moderate to severe spinal canal

## 2020-01-06 ENCOUNTER — APPOINTMENT (OUTPATIENT)
Dept: MRI IMAGING | Age: 85
DRG: 478 | End: 2020-01-06
Payer: MEDICARE

## 2020-01-06 LAB
ANION GAP SERPL CALCULATED.3IONS-SCNC: 14 MEQ/L (ref 8–16)
BUN BLDV-MCNC: 25 MG/DL (ref 7–22)
CALCIUM SERPL-MCNC: 9.1 MG/DL (ref 8.5–10.5)
CHLORIDE BLD-SCNC: 102 MEQ/L (ref 98–111)
CO2: 22 MEQ/L (ref 23–33)
CREAT SERPL-MCNC: 0.9 MG/DL (ref 0.4–1.2)
EKG ATRIAL RATE: 84 BPM
EKG P AXIS: 58 DEGREES
EKG P-R INTERVAL: 170 MS
EKG Q-T INTERVAL: 382 MS
EKG QRS DURATION: 144 MS
EKG QTC CALCULATION (BAZETT): 451 MS
EKG R AXIS: -13 DEGREES
EKG T AXIS: 66 DEGREES
EKG VENTRICULAR RATE: 84 BPM
ERYTHROCYTE [DISTWIDTH] IN BLOOD BY AUTOMATED COUNT: 12.8 % (ref 11.5–14.5)
ERYTHROCYTE [DISTWIDTH] IN BLOOD BY AUTOMATED COUNT: 41.7 FL (ref 35–45)
GFR SERPL CREATININE-BSD FRML MDRD: 59 ML/MIN/1.73M2
GLUCOSE BLD-MCNC: 167 MG/DL (ref 70–108)
GLUCOSE BLD-MCNC: 178 MG/DL (ref 70–108)
GLUCOSE BLD-MCNC: 180 MG/DL (ref 70–108)
GLUCOSE BLD-MCNC: 187 MG/DL (ref 70–108)
GLUCOSE BLD-MCNC: 261 MG/DL (ref 70–108)
HCT VFR BLD CALC: 39.1 % (ref 37–47)
HEMOGLOBIN: 12.8 GM/DL (ref 12–16)
MCH RBC QN AUTO: 29.8 PG (ref 26–33)
MCHC RBC AUTO-ENTMCNC: 32.7 GM/DL (ref 32.2–35.5)
MCV RBC AUTO: 90.9 FL (ref 81–99)
PLATELET # BLD: 397 THOU/MM3 (ref 130–400)
PMV BLD AUTO: 9.6 FL (ref 9.4–12.4)
POTASSIUM SERPL-SCNC: 4.3 MEQ/L (ref 3.5–5.2)
RBC # BLD: 4.3 MILL/MM3 (ref 4.2–5.4)
SODIUM BLD-SCNC: 138 MEQ/L (ref 135–145)
WBC # BLD: 7.7 THOU/MM3 (ref 4.8–10.8)

## 2020-01-06 PROCEDURE — 93010 ELECTROCARDIOGRAM REPORT: CPT | Performed by: INTERNAL MEDICINE

## 2020-01-06 PROCEDURE — 6370000000 HC RX 637 (ALT 250 FOR IP): Performed by: FAMILY MEDICINE

## 2020-01-06 PROCEDURE — 99222 1ST HOSP IP/OBS MODERATE 55: CPT | Performed by: NURSE PRACTITIONER

## 2020-01-06 PROCEDURE — 2580000003 HC RX 258: Performed by: FAMILY MEDICINE

## 2020-01-06 PROCEDURE — 72146 MRI CHEST SPINE W/O DYE: CPT

## 2020-01-06 PROCEDURE — 82948 REAGENT STRIP/BLOOD GLUCOSE: CPT

## 2020-01-06 PROCEDURE — 93005 ELECTROCARDIOGRAM TRACING: CPT | Performed by: NURSE PRACTITIONER

## 2020-01-06 PROCEDURE — 85027 COMPLETE CBC AUTOMATED: CPT

## 2020-01-06 PROCEDURE — 2709999900 HC NON-CHARGEABLE SUPPLY

## 2020-01-06 PROCEDURE — 36415 COLL VENOUS BLD VENIPUNCTURE: CPT

## 2020-01-06 PROCEDURE — 1200000000 HC SEMI PRIVATE

## 2020-01-06 PROCEDURE — 80048 BASIC METABOLIC PNL TOTAL CA: CPT

## 2020-01-06 PROCEDURE — 6370000000 HC RX 637 (ALT 250 FOR IP): Performed by: NURSE PRACTITIONER

## 2020-01-06 PROCEDURE — 99232 SBSQ HOSP IP/OBS MODERATE 35: CPT | Performed by: NURSE PRACTITIONER

## 2020-01-06 RX ADMIN — LEVOTHYROXINE SODIUM 88 MCG: 88 TABLET ORAL at 08:15

## 2020-01-06 RX ADMIN — RAMIPRIL 2.5 MG: 2.5 CAPSULE ORAL at 08:15

## 2020-01-06 RX ADMIN — AMITRIPTYLINE HYDROCHLORIDE 25 MG: 25 TABLET, FILM COATED ORAL at 23:06

## 2020-01-06 RX ADMIN — INSULIN LISPRO 1 UNITS: 100 INJECTION, SOLUTION INTRAVENOUS; SUBCUTANEOUS at 13:35

## 2020-01-06 RX ADMIN — CALCIUM 500 MG: 500 TABLET ORAL at 08:15

## 2020-01-06 RX ADMIN — METFORMIN HYDROCHLORIDE 500 MG: 500 TABLET ORAL at 18:11

## 2020-01-06 RX ADMIN — PRAVASTATIN SODIUM 20 MG: 20 TABLET ORAL at 23:06

## 2020-01-06 RX ADMIN — VENLAFAXINE 75 MG: 37.5 TABLET ORAL at 08:15

## 2020-01-06 RX ADMIN — INSULIN LISPRO 2 UNITS: 100 INJECTION, SOLUTION INTRAVENOUS; SUBCUTANEOUS at 23:07

## 2020-01-06 RX ADMIN — DOCUSATE SODIUM 100 MG: 100 CAPSULE, LIQUID FILLED ORAL at 08:17

## 2020-01-06 RX ADMIN — METFORMIN HYDROCHLORIDE 500 MG: 500 TABLET ORAL at 08:15

## 2020-01-06 RX ADMIN — METOPROLOL SUCCINATE 25 MG: 25 TABLET, FILM COATED, EXTENDED RELEASE ORAL at 08:15

## 2020-01-06 RX ADMIN — Medication 10 ML: at 23:06

## 2020-01-06 RX ADMIN — Medication 10 ML: at 08:18

## 2020-01-06 RX ADMIN — ACETAMINOPHEN 650 MG: 325 TABLET ORAL at 23:06

## 2020-01-06 RX ADMIN — DOXYCYCLINE HYCLATE 50 MG: 100 TABLET, COATED ORAL at 08:15

## 2020-01-06 RX ADMIN — INSULIN LISPRO 1 UNITS: 100 INJECTION, SOLUTION INTRAVENOUS; SUBCUTANEOUS at 18:08

## 2020-01-06 RX ADMIN — INSULIN LISPRO 1 UNITS: 100 INJECTION, SOLUTION INTRAVENOUS; SUBCUTANEOUS at 10:32

## 2020-01-06 ASSESSMENT — PAIN SCALES - GENERAL
PAINLEVEL_OUTOF10: 0
PAINLEVEL_OUTOF10: 0

## 2020-01-06 NOTE — PROGRESS NOTES
Amy Mccollum 60  PHYSICAL THERAPY MISSED TREATMENT NOTE  ACUTE CARE    Date: 2020  Patient Name: Alfred Rios        MRN: [id number]   : 1934  (80 y.o.)  Gender: female                REASON FOR MISSED TREATMENT:  Missed Treat. Pt off unit for MRI, will check back tomorrow.

## 2020-01-06 NOTE — PROGRESS NOTES
positive nuclei:  40%               Inform HER-2 Dual KARL (Vignesh [address])  (X)  Nonamplified - HER2/CEP17 ratio  < 2.0 AND average HER2         copy number  < 4.0 signals/cell     She discussed her treatment options during her surgical consultation and decided to proceed with  lumpectomy and sentinel lymph node biopsy. She underwent surgery on November 17, 2016. Final pathology report showed: [place] Lymph node, left axilla, resection:   Negative for neoplasm in 1 out of 1 lymph node. B. Left breast, lumpectomy:   Invasive, poorly differentiated ductal carcinoma, Theresa score 3.   Ductal carcinoma in situ, comedo type, high grade.   Maximum tumor size = 1.8 cm.   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 Since she had a positive margin for DCIS, on November 29, 2016 she had  reexcision which was negative for residual carcinoma. The patient decided against systemic adjuvant chemotherapy. She received postlumpectomy RTX,  6000 cGy, completed in March 2017.   Meds    Current Medications    insulin lispro  0-6 Units Subcutaneous TID WC    insulin lispro  0-3 Units Subcutaneous Nightly    lidocaine  1 patch Transdermal Daily    metFORMIN  500 mg Oral BID WC    levothyroxine  88 mcg Oral Daily    amitriptyline  25 mg Oral Nightly    pravastatin  20 mg Oral Nightly    doxycycline hyclate  50 mg Oral Daily    calcium elemental  500 mg Oral Daily    docusate sodium  100 mg Oral BID    venlafaxine  75 mg Oral Daily    ramipril  2.5 mg Oral Daily    metoprolol succinate  25 mg Oral Daily    aspirin  81 mg Oral Daily    acetaminophen  650 mg Oral Nightly    sodium chloride flush  10 mL Intravenous 2 times per day     acetaminophen, traMADol, sodium chloride flush, magnesium hydroxide, ondansetron, glucose, dextrose, glucagon (rDNA), dextrose  IV Drips/Infusions   dextrose       Past Medical History         Diagnosis Date    Arthritis     Cancer ([facility] [address] 11/2016    LEFT BREAST    Depression  Diabetes ([facility] [address]     Hyperlipidemia     Hypertension     Hypothyroidism     PONV (postoperative nausea and vomiting)     Spinal stenosis       Past Surgical History           Procedure Laterality Date    BREAST SURGERY Left     lumpectomy    EYE SURGERY  2006    cataracts    HYSTERECTOMY  1980    OTHER SURGICAL HISTORY Left 11/29/2016    Re-excision deep margins left breast    TONSILLECTOMY      as a child     Diet    DIET GENERAL; Carb Control: 3 carb choices (45 gms)/meal; No Added Salt (3-4 GM)  Allergies    Patient has no known allergies.   Social History     Social History     Socioeconomic History    Marital status:      Spouse name: Not on file    Number of children: Not on file    Years of education: Not on file    Highest education level: Not on file   Occupational History    Not on file   Social Needs    Financial resource strain: Not on file    Food insecurity:     Worry: Not on file     Inability: Not on file    Transportation needs:     Medical: Not on file     Non-medical: Not on file   Tobacco Use    Smoking status: Never Smoker    Smokeless tobacco: Never Used   Substance and Sexual Activity    Alcohol use: No    Drug use: No    Sexual activity: Not on file   Lifestyle    Physical activity:     Days per week: Not on file     Minutes per session: Not on file    Stress: Not on file   Relationships    Social connections:     Talks on phone: Not on file     Gets together: Not on file     Attends [employer] service: Not on file     Active member of club or organization: Not on file     Attends meetings of clubs or organizations: Not on file     Relationship status: Not on file    Intimate partner violence:     Fear of current or ex partner: Not on file     Emotionally abused: Not on file     Physically abused: Not on file     Forced sexual activity: Not on file   Other Topics Concern    Not on file   Social History Narrative    Not on file     Family History and/or weight-based dosing when appropriate to reduce radiation dose to as low as reasonably achievable. FINDINGS: Brain: There is no acute ischemic infarct, hemorrhage, midline shift, mass, or mass effect. There are mild deep white matter hypodensities, nonspecific in nature but probably representing small vessel chronic ischemic changes. There is age appropriate cortical atrophy. Ventricles/basal cisterns: The ventricles and cisterns are of appropriate size and configuration for the patient's age. No evidence of obstructive hydrocephalus. Vascular: There are atherosclerotic calcifications in the bilateral vertebral and cavernous internal carotid arteries. Skull base/calvarium/osseous structures: Unremarkable Soft tissues: Unremarkable Intraorbital contents: Unremarkable Sinuses: Unremarkable; the imaged sinuses are clear without evidence of mucosal thickening or fluid levels. Mastoids: Unremarkable; the mastoid air cells are clear. No acute ischemic infarct, hemorrhage, or mass effect. Aging changes as discussed above. **This report has been created using voice recognition software. It may contain minor errors which are inherent in voice recognition technology. ** Final report electronically signed by Dr. Darwin Guerrero on 1/3/2020 10:22 AM    Ct Chest W Contrast    Result Date: 1/3/2020  PROCEDURE: CT CHEST W CONTRAST CLINICAL INFORMATION: fall. COMPARISON: Chest x-ray earlier today TECHNIQUE: 5 mm axial images were obtained through the chest after the administration of intravenous contrast. Sagittal and coronal reconstructions were obtained. All CT scans at this facility use dose modulation, iterative reconstruction, and/or weight-based dosing when appropriate to reduce radiation dose to as low as reasonably achievable. FINDINGS: Lungs:  There is a lobulated 2.3 x 3 cm lateral right upper lobe pulmonary mass consistent with primary lung carcinoma versus a metastasis; there is an additional 8 x 7 mm left upper lobe pulmonary nodule (axial image 26). There is also a subtle 4 x 3 mm noncalcified nodule in the lateral right middle lobe (axial image 31). There are a few scattered calcified granulomas. There is bilateral multilobar atelectasis. There is no pneumonia. The trachea and major bronchi are unremarkable. Pleura: No pleural effusion. No pneumothorax. Diaphragm: There is moderate elevation of the right hemidiaphragm. Heart: Heart size is normal. There is no pericardial effusion. Isamar and mediastinum: There is no mass or adenopathy. There are calcified mediastinal and right hilar lymph nodes. There is a small hiatal hernia. Pulmonary vasculature: Unremarkable Thoracic aorta/vascular: No thoracic aortic aneurysm or dissection. The imaged portions of the brachiocephalic arteries are unremarkable. Imaged upper abdomen: See the accompanying abdomen pelvis CT report. Musculoskeletal system: There is multilevel cervical and thoracic degenerative disc disease and endplate spondylosis. There is a mild to moderate compression fracture of T2 of indeterminate age however there is an acute minimally displaced fracture of the anterior aspect of the vertebral body. This is better seen on the accompanying cervical spine CT. There is associated mild prevertebral edema or hemorrhage at this level. . There is an old mild compression fracture of T7. There is mild thoracic levoscoliosis. Chest/body wall soft tissues: There are postoperative changes of the left breast with multiple surgical clips and an adjacent 1.9 x 1.3 cm scar in the medial left breast. Thyroid: There is a 1.5 x 1.3 cm right thyroid nodule. Consider nonemergent ultrasound for further evaluation. Acute minimally displaced fracture of the anterior T2 vertebral body with an age indeterminate mild to moderate compression fracture component. Associated mild prevertebral edema or hemorrhage.   2.3 x 3 cm right upper lobe pulmonary mass consistent with primary lung carcinoma technology. ** Final report electronically signed by Dr. Adrienne Aparicio on 1/3/2020 10:23 AM    Ct Abdomen Pelvis W Iv Contrast Additional Contrast? None    Result Date: 1/3/2020  PROCEDURE: CT ABDOMEN PELVIS W IV CONTRAST CLINICAL INFORMATION: fall, R lateral rib pain, urinary incontinence, please recon spine . COMPARISON: None. TECHNIQUE: 5 mm axial CT images were obtained through the abdomen and pelvis after the administration of intravenous contrast. Coronal and sagittal reconstructions were obtained. All CT scans at this facility use dose modulation, iterative reconstruction, and/or weight-based dosing when appropriate to reduce radiation dose to as low as reasonably achievable. FINDINGS: Lower chest: See the accompanying chest CT report. Liver: There are scattered calcified granulomas. There is a 4 mm round hypodense lesion in the anterior right lobe of the liver, too small to characterize but possibly a cyst.. There is no liver mass or intrahepatic biliary dilatation. Gallbladder/Biliary tree: Unremarkable. No calcified gallstones. No extrahepatic biliary dilatation. Spleen: There are calcified granulomas. No splenomegaly. Pancreas: There is severe pancreatic atrophy with diffuse severe dilatation of the pancreatic duct. No obstructing process is identified. There are 2 subcentimeter nodular foci in the pancreatic duct in the tail (sagittal image 26, coronal image 27). Neoplasm not excluded. There are no findings to suggest acute pancreatitis. Adrenal glands: There is thickening of the limbs of the adrenal glands. No mass. Kidneys and ureters: There is a 1.2 x 0.9 cm ovoid hypodense lesion in the extreme upper pole of the left kidney, likely a cyst. There is bilateral renal scarring. No hydroureteronephrosis. No renal or ureteral calculi. No findings to suggest acute pyelonephritis. Stomach, small bowel, and colon: There is a small hiatal hernia. Stomach and duodenum are otherwise unremarkable.  There is colonic diverticulosis without diverticulitis. Small bowel and colon are otherwise unremarkable. There is no evidence of bowel wall thickening. No evidence of bowel obstruction. Appendix: Unremarkable. No findings to suggest acute appendicitis. Omentum and mesentery: Unremarkable Aorta, vascular: No aortic aneurysm or dissection. No significant vascular abnormality. Reproductive: The uterus is absent consistent with hysterectomy. The adnexal regions are unremarkable. There is a pessary in the vagina. Bladder: The wall of the urinary bladder is mildly thickened, most likely due to incomplete distension. No calcified stones. Intraperitoneal/retroperitoneal Space: There is no ascites, abscess, adenopathy, or mass. No pneumoperitoneum. Abdominal and pelvic body wall soft tissues: There is a tiny fat-containing umbilical hernia. Musculoskeletal structures: There are old mild compression fracture deformities of T7 and T8. There is no acute fracture. There is multilevel thoracolumbar degenerative disc disease with endplate spondylosis. There is multilevel lumbar facet joint DJD. There is grade 1 anterolisthesis of L4 on L5, likely degenerative in nature. No acute traumatic abnormality of the solid or hollow viscera of the abdomen and pelvis. No acute inflammatory or infectious process in the abdomen or pelvis. Colonic diverticulosis without diverticulitis. Severe pancreatic atrophy with marked diffuse pancreatic ductal dilatation. 2 subcentimeter nodular foci in the duct in the pancreatic tail. Neoplasm not excluded. Additional nonemergent findings as detailed above. **This report has been created using voice recognition software. It may contain minor errors which are inherent in voice recognition technology. ** Final report electronically signed by Dr. Ricky Patel on 1/3/2020 10:55 AM    Ct Lumbar Reconstruction Wo Post Process    Result Date: 1/3/2020  PROCEDURE: CT LUMBAR RECONSTRUCTION WO POST PROCESS CLINICAL INFORMATION: pain. Rib and back pain. COMPARISON: Lumbar spine radiographs dated 9/19/2016. TECHNIQUE: Axial, sagittal and coronal reformatted images of the lumbar spine reconstructed from the patient's abdomen and pelvis CT. IV contrast is present. All CT scans at this facility use dose modulation, iterative reconstruction, and/or weight-based dosing when appropriate to reduce radiation dose to as low as reasonably achievable. FINDINGS: There is grade 1 anterolisthesis of L4 relative to L5 on the basis of degenerative change, stable compared to prior radiographs. There is otherwise anatomic vertebral body height and alignment. No fracture of the lumbar vertebral column is identified. There is mild osteophytosis at the sacral iliac joints bilaterally. There is disc space narrowing and vacuum disc phenomenon at T8-9 through T11-12 with associated anterior osteophytes. At T12-L1 there is no significant spinal canal or neuroforaminal stenosis. At L1-2 there is a shallow disc bulge without significant spinal canal stenosis and moderate bilateral neural foraminal stenosis in association with mild facet hypertrophy. At L2-3 there is a shallow disc bulge with facet hypertrophy and ligamentum flavum thickening which causes moderate spinal canal stenosis. There is also moderate bilateral foraminal stenosis at this level. At L3-4 there is a shallow disc bulge with prominent facet hypertrophy and ligamentum flavum thickening which causes moderate to severe spinal canal stenosis and moderate to severe left and moderate right neuroforaminal stenosis. At L4-5 there is pronounced facet hypertrophy and ligament flavum thickening causing the anterolisthesis of L4 relative to L5 with partial uncovering the disc and superimposed disc bulge contributing to moderate to severe spinal canal stenosis and mild to moderate left and moderate right neural foraminal stenosis. At L5-S1 there is no significant spinal canal stenosis.  There is mild to moderate bilateral neural foraminal stenosis in association with facet hypertrophy and ligamentum flavum thickening. 1. No evidence of acute osseous injury of the lumbar spine. 2. Multilevel degenerative changes with areas of moderate to severe spinal canal stenosis at L3-4 and L4-5 and up to moderate to severe neuroforaminal stenosis. **This report has been created using voice recognition software. It may contain minor errors which are inherent in voice recognition technology. ** Final report electronically signed by Dr. Hailey Estrada MD on 1/3/2020 10:32 AM    [facility] Nando Digital Screen Bilateral    Result Date: 12/9/2019  IMPRESSION: Mammogram BI-RADS: 2: Benign There is no mammographic evidence of malignancy. A 1 year screening mammogram is recommended. The patient has been entered into our database and they will receive a notification when they are due for their next exam.  The patient was notified of the results. #[id number] - [facility] NANDO DIGITAL SCREEN BILATERAL BILATERAL DIGITAL SCREENING MAMMOGRAM 3D/2D WITH CAD: 12/9/2019 CLINICAL: Tomosynthesis. Screening. Personal history of breast cancer. Comparison is made to exams dated:  11/6/2018 mammogram and 11/15/2017 mammogram - [facility]. The tissue of both breasts is heterogeneously dense. This may lower the sensitivity of mammography. Current study was also evaluated with a Computer Aided Detection (CAD) system. There are benign vascular calcifications both breasts. There also are benign scattered calcifications both breasts. Additionally, there are benign calcifications right breast.  Additionally, there alsoare post operative findings left breast.  No significant masses, calcifications, or other findings are seen  in either breast.  There has been no significant interval change.  Dr. Kreline roldan/jairo:12/9/2019 16:21:12  copy to: David Butler M.D., [facility], ph: [phone],  fax: [phone]

## 2020-01-06 NOTE — CONSULTS
Pain Consult Note      Admitting Physician: Bill Dotson APRN - CNP    Primary Care Provider: Timur Mancini MD     Reason for Consult:  Pain management consult requested by Macy Pedraza PA-C for patient with intractable back pain, T2 compression fracture. History of Present Illness:  Alfred Rios is a 80 y.o. female admitted to [address] [place] [address] on 1/3/2020. Patient presented to [address] [place] [address] with a chief complaint of intractable back pain./ Patient with a history of left breast cancer s/p lumpectomy and radiation therapy, type II diabetes, CKD, lung mass. Patient reports that she developed mid upper back pain after falling in the kitchen 12/21/2019at a [employer] party losing footing when her foot caught a chair in which she landed flat on her back. Work up was completed and she was found to have an acute T2 compression fracture maybe related to osteoporosis but metastatic disease could not be ruled out. Patient reports that she does not have any pain at rest when she is still but states that she tries not to move because her pain increases and gets to be severe level. Prior to admission she lived alone and plans on being discharged back home. Discussed that reason for consult was to improve pain control with a Kyphoplasty in which I described procedure in detail with patient and familly in which they are agreeable to proceed. The patient was seen by the pain team today which included myself, Dr. Bora Lake, and at the time of our evaluation the patient complains of pain in her mid upper back in which she rates at a 0-2/10 at rest but increases to 6-8/10 with any movement. Patient describes her pain as a sharp and hurting pain. States \"I don't want to move at all\". Taking only tylenol for pain which does help some. Again her goal is to return home to her house alone.      Past Medical History:        Diagnosis Date    Arthritis     Cancer ([facility] [address] 11/2016    LEFT BREAST  Depression     Diabetes ([facility] [address]     Hyperlipidemia     Hypertension     Hypothyroidism     PONV (postoperative nausea and vomiting)     Spinal stenosis        Past Surgical History:        Procedure Laterality Date    BREAST SURGERY Left     lumpectomy    EYE SURGERY  2006    cataracts    HYSTERECTOMY  1980    OTHER SURGICAL HISTORY Left 11/29/2016    Re-excision deep margins left breast    TONSILLECTOMY      as a child       Allergies:    No Known Allergies     Current Medications:   Current Facility-Administered Medications: insulin lispro (HUMALOG) injection vial 0-6 Units, 0-6 Units, Subcutaneous, TID WC  insulin lispro (HUMALOG) injection vial 0-3 Units, 0-3 Units, Subcutaneous, Nightly  lidocaine 4 % external patch 1 patch, 1 patch, Transdermal, Daily  metFORMIN (GLUCOPHAGE) tablet 500 mg, 500 mg, Oral, BID WC  levothyroxine (SYNTHROID) tablet 88 mcg, 88 mcg, Oral, Daily  amitriptyline (ELAVIL) tablet 25 mg, 25 mg, Oral, Nightly  pravastatin (PRAVACHOL) tablet 20 mg, 20 mg, Oral, Nightly  doxycycline hyclate (VIBRA-TABS) tablet 50 mg, 50 mg, Oral, Daily  calcium elemental (OSCAL) tablet 500 mg, 500 mg, Oral, Daily  docusate sodium (COLACE) capsule 100 mg, 100 mg, Oral, BID  venlafaxine (EFFEXOR) tablet 75 mg, 75 mg, Oral, Daily  ramipril (ALTACE) capsule 2.5 mg, 2.5 mg, Oral, Daily  metoprolol succinate (TOPROL XL) extended release tablet 25 mg, 25 mg, Oral, Daily  aspirin chewable tablet 81 mg, 81 mg, Oral, Daily  acetaminophen (TYLENOL) tablet 650 mg, 650 mg, Oral, Nightly  acetaminophen (TYLENOL) tablet 650 mg, 650 mg, Oral, Q6H PRN  traMADol (ULTRAM) tablet 50 mg, 50 mg, Oral, Q12H PRN  sodium chloride flush 0.9 % injection 10 mL, 10 mL, Intravenous, 2 times per day  sodium chloride flush 0.9 % injection 10 mL, 10 mL, Intravenous, PRN  magnesium hydroxide (MILK OF MAGNESIA) 400 MG/5ML suspension 30 mL, 30 mL, Oral, Daily PRN  ondansetron (ZOFRAN) injection 4 mg, 4 mg, Intravenous, Q6H PRN  glucose (GLUTOSE) 40 % oral gel 15 g, 15 g, Oral, PRN  dextrose 50 % IV solution, 12.5 g, Intravenous, PRN  glucagon (rDNA) injection 1 mg, 1 mg, Intramuscular, PRN  dextrose 5 % solution, 100 mL/hr, Intravenous, PRN    Social History:  Social History     Socioeconomic History    Marital status:      Spouse name: Not on file    Number of children: Not on file    Years of education: Not on file    Highest education level: Not on file   Occupational History    Not on file   Social Needs    Financial resource strain: Not on file    Food insecurity:     Worry: Not on file     Inability: Not on file    Transportation needs:     Medical: Not on file     Non-medical: Not on file   Tobacco Use    Smoking status: Never Smoker    Smokeless tobacco: Never Used   Substance and Sexual Activity    Alcohol use: No    Drug use: No    Sexual activity: Not on file   Lifestyle    Physical activity:     Days per week: Not on file     Minutes per session: Not on file    Stress: Not on file   Relationships    Social connections:     Talks on phone: Not on file     Gets together: Not on file     Attends Christianity service: Not on file     Active member of club or organization: Not on file     Attends meetings of clubs or organizations: Not on file     Relationship status: Not on file    Intimate partner violence:     Fear of current or ex partner: Not on file     Emotionally abused: Not on file     Physically abused: Not on file     Forced sexual activity: Not on file   Other Topics Concern    Not on file   Social History Narrative    Not on file       Family History:       Problem Relation Age of Onset    Cancer Father         brain    Cancer Paternal Grandfather         stomach       Review of Systems:  CONSTITUTIONAL:  Debility and pain   EYES:  negative  HEENT:  negative  RESPIRATORY:  negative  CARDIOVASCULAR:  negative  GASTROINTESTINAL:  Incontinence + BM 1/6/2019   GENITOURINARY:  Incontinence on admission which has resolved   SKIN:  negative  HEMATOLOGIC/LYMPHATIC:  Negative   MUSCULOSKELETAL:  positive for  myalgias, arthralgias, pain and muscle weakness  NEUROLOGICAL:  negative  BEHAVIOR/PSYCH:  negative  System review otherwise negative      Physical Exam:  BP (!) 101/55   Pulse 84   Temp 97.4 °F (36.3 °C) (Oral)   Resp 20   Ht 5' 3\" (1.6 m)   Wt 172 lb 12.8 oz (78.4 kg)   SpO2 90%   BMI 30.61 kg/m²      awake  Orientation:   person, place, time  Mood: within normal limits  Affect: calm  General appearance: Patient is well nourished, well developed, well groomed and in no acute distress, appearing stated age    Memory:   normal,   Attention/Concentration: normal  Language:  normal    Cranial Nerves:  cranial nerves II-XII are grossly intact  ROM:  Normal ROM in all 4 extremities, guarded with movement d/t pain in upper back   Motor Exam:  Motor exam is 5 out of 5 all extremities with the exception of 4/5 strength bilateral lower extremities   Tenderness mid upper back at T2 level   Tone:  normal  Muscle bulk: within normal limits  Sensory:  Sensory intact      Heart: normal rate, regular rhythm, normal S1, S2, no murmurs, rubs, clicks or gallops  Lungs: Diminished, clear to auscultation without wheezes or rales.  On room air   Abdomen: soft, non-tender, non-distended, normal bowel sounds, no masses or organomegaly    Skin: warm and dry, no rash or erythema  Peripheral vascular: Pulses: Normal upper and lower extremity pulses; Edema: trace      Diagnostics:  Recent Results (from the past 24 hour(s))   POCT glucose    Collection Time: 01/05/20  5:30 PM   Result Value Ref Range    POC Glucose 174 (H) 70 - 108 mg/dl   POCT glucose    Collection Time: 01/05/20 11:05 PM   Result Value Ref Range    POC Glucose 209 (H) 70 - 108 mg/dl   CBC    Collection Time: 01/06/20  4:53 AM   Result Value Ref Range    WBC 7.7 4.8 - 10.8 thou/mm3    RBC 4.30 4.20 - 5.40 mill/mm3    Hemoglobin 12.8 12.0 - 16.0 gm/dl Hematocrit 39.1 37.0 - 47.0 %    MCV 90.9 81.0 - 99.0 fL    MCH 29.8 26.0 - 33.0 pg    MCHC 32.7 32.2 - 35.5 gm/dl    RDW-CV 12.8 11.5 - 14.5 %    RDW-SD 41.7 35.0 - 45.0 fL    Platelets 904 698 - 744 thou/mm3    MPV 9.6 9.4 - 12.4 fL   Basic Metabolic Panel    Collection Time: 01/06/20  4:53 AM   Result Value Ref Range    Sodium 138 135 - 145 meq/L    Potassium 4.3 3.5 - 5.2 meq/L    Chloride 102 98 - 111 meq/L    CO2 22 (L) 23 - 33 meq/L    Glucose 180 (H) 70 - 108 mg/dL    BUN 25 (H) 7 - 22 mg/dL    CREATININE 0.9 0.4 - 1.2 mg/dL    Calcium 9.1 8.5 - 10.5 mg/dL   Anion Gap    Collection Time: 01/06/20  4:53 AM   Result Value Ref Range    Anion Gap 14.0 8.0 - 16.0 meq/L   Glomerular Filtration Rate, Estimated    Collection Time: 01/06/20  4:53 AM   Result Value Ref Range    Est, Glom Filt Rate 59 (A) ml/min/1.73m2   POCT glucose    Collection Time: 01/06/20  8:56 AM   Result Value Ref Range    POC Glucose 178 (H) 70 - 108 mg/dl   POCT glucose    Collection Time: 01/06/20  1:07 PM   Result Value Ref Range    POC Glucose 167 (H) 70 - 108 mg/dl     CT of Cervical spine:   CT CERVICAL SPINE WO CONTRAST [[id number]] Resulted: 01/03/20 1023      Order Status: Completed Updated: 01/03/20 1025     Narrative:       PROCEDURE: CT CERVICAL SPINE WO CONTRAST    CLINICAL INFORMATION: trauma . COMPARISON: No prior study. TECHNIQUE: 2-D multiplanar noncontrast images of the cervical spine. Bone windows only. All CT scans at this facility use dose modulation, iterative reconstruction, and/or weight-based dosing when appropriate to reduce radiation dose to as low as reasonably achievable. FINDINGS: There is an acute anterior wedge compression fracture of T2. There is no retropulsion or central canal encroachment. There is a approximately 25% loss of vertebral body height. There is periureteral vertebral hematoma. There is no acute cervical spine fracture.  There is disc space narrowing and degenerative change at every level is most severe at C5-6 and C6-7. Hypertrophic facet changes are present throughout and are greater on the left and the right. There is marked right neural foraminal narrowing at C5-6. No precervical soft tissue swelling. Inhomogeneous appearance of the thyroid with enlargement of the right thyroid lobe suggesting goiter. Case discussed with Dr. Tommy Alejandro at the time of exam.     Impression:       Acute anterior wedge compression fracture of T2 with no central canal encroachment. CT of lumbar spine:   FINDINGS:    There is grade 1 anterolisthesis of L4 relative to L5 on the basis of degenerative change, stable compared to prior radiographs. There is otherwise anatomic vertebral body height and alignment. No fracture of the lumbar vertebral column is identified. There is mild osteophytosis at the sacral iliac joints bilaterally. There is disc space narrowing and vacuum disc phenomenon at T8-9 through T11-12 with associated anterior osteophytes. At T12-L1 there is no significant spinal canal or neuroforaminal stenosis. At L1-2 there is a shallow disc bulge without significant spinal canal stenosis and moderate bilateral neural foraminal stenosis in association with mild facet hypertrophy. At L2-3 there is a shallow disc bulge with facet hypertrophy and ligamentum flavum thickening which causes moderate spinal canal stenosis. There is also moderate bilateral foraminal stenosis at this level. At L3-4 there is a shallow disc bulge with prominent facet hypertrophy and ligamentum flavum thickening which causes moderate to severe spinal canal stenosis and moderate to severe left and moderate right neuroforaminal stenosis.   At L4-5 there is pronounced facet hypertrophy and ligament flavum thickening causing the anterolisthesis of L4 relative to L5 with partial uncovering the disc and superimposed disc bulge contributing to moderate to severe spinal canal stenosis and mild   to moderate left and moderate 3. Right lung mass   4. History of breast cancer   5. Chronic T7 and T8 compression fracture       Met with today's pain team as above. Discussed patient symptoms, reviewed medications and possible drug interactions, medication side effect profiles, previous medications and their efficacies, medical concerns regarding each pain management option (ie blood pressure, GI concerns, etc). Also reviewed labs (including creatinine clearance and LFT's regarding medication metabolism). Also reviewed all work-up studies including radiologic and other consultants. OARRS report was obtained and reviewed. Recommendations:  1. Reviewed all radiologic tests. Ordered T-MRI which confirmed T2 compression fracture. Discussed in detail with patient and family. 2. Plan Kyphoplasty @ T2 tomorrow with biopsy and possible osteocool. Procedure and risks discussed in detail with patient and family. Scheduled at 1230 pm in main OR  3. Patient will be NPO at midnight. Lovenox discontinued   4. Continue current medications   5. Reviewed labs, ordered 12 lead EKG. It was my pleasure to evaluate Shahab Hugo today. Please call with questions.     Jose Guadalupe Evans, JAYSON - CNP

## 2020-01-06 NOTE — PROGRESS NOTES
Nutrition Education    Type and Reason for Visit: Initial, Consult(Patient is diabetic and enjoys pancakes with syrup. She could benefit from education on healthy dietary options conducive for optimal glycemic control.)    Nutrition Assessment:   Pt. Seen - admits to not monitoring her blood sugars closely. States will be discussing medication adjustment with her doctor. Admits to sweet tooth and hates to give them up. Does consume sugary beverages. Discussed substituting unsweetened or sugar free beverages. Encouraged portion control with sweets however did encourage pt to enjoy in small amounts. · Verbally reviewed information with Patient  · Written educational materials provided - added basics to discharge instructions  · Contact name and number provided. · Refer to Patient Education activity for more details.     Electronically signed by Jessica Bloom RD, LD on 1/6/20 at 2:09 PM    Contact Number: [phone]

## 2020-01-06 NOTE — CARE COORDINATION
20, 8:08 AM  DISCHARGE PLANNING EVALUATION:    Flynn Robertson       Admitted from: ED 1/3/2020/ [facility] 200 day: 3   Location: 8A--A Reason for admit: Intractable pain [R52] Status: IP  Admit order signed?: yes  PMH:  has a past medical history of Arthritis, Cancer ([address] Depression, Diabetes ([address] Hyperlipidemia, Hypertension, Hypothyroidism, PONV (postoperative nausea and vomiting), and Spinal stenosis. Procedure: none  Pertinent abnormal Imagin/3 MRI abdomen:   gross pancreatic mass is not seen. There is pancreatic atrophy as well as moderate ductal dilatation.       Multiple tiny gallstones are noted     1/3 Thoracic CT:  1. Redemonstration of acute compression deformity of T2 with approximately 30% anterior height loss. No definite underlying focal lesion is identified although MRI is much more sensitive for evaluation of metastatic disease. 2. Chronic appearing mild compression deformity of the T7 vertebral body with approximately 20% anterior height loss. 3. Mild degenerative changes of the thoracic spine without significant spinal canal or neuroforaminal stenosis at any thoracic level. 1/3 CT abdomen/pelvis:   No acute traumatic abnormality of the solid or hollow viscera of the abdomen and pelvis. No acute inflammatory or infectious process in the abdomen or pelvis. Colonic diverticulosis without diverticulitis. Severe pancreatic atrophy with marked diffuse pancreatic ductal dilatation. 2 subcentimeter nodular foci in the duct in the pancreatic tail. Neoplasm not excluded. Additional nonemergent findings as detailed above. 1/3 CT head:   No acute ischemic infarct, hemorrhage, or mass effect. Aging changes as discussed above. 1/3 CT cervical spine:   Acute anterior wedge compression fracture of T2 with no central canal encroachment.         1/3 CT lumbar:   1. No evidence of acute osseous injury of the lumbar spine.    2. Multilevel degenerative changes with

## 2020-01-06 NOTE — PROGRESS NOTES
monitor. 9. Hypothyroidism: Stable, continue Synthroid. 10. Hypertension: Stable, continue metoprolol. Cardiac, no added salt diet. 11. Hyperlipidemia: Continue pravastatin. 12. Slight ALP elevation: Likely secondary to gallstones. 13. Chronic kidney disease stage II: Stable.  Current creatinine 0.9.  Avoid nephrotoxic agents. Chief Complaint: Intractable pain    Initial H and P:-    Chris Bravo an 27-year-old  female, lifetime non-smoker, with a medical history of spinal stenosis, hypothyroidism, hypertension, hyperlipidemia, diabetes mellitus, depression, cancer of the left breast, and arthritis.   Patient presents [facility] ER for a reevaluation of prior complaints/symptoms that resulted from a fall taking place on 12/21/2019.  The patient was at a [employer] party, standing in the kitchen, when she stepped backwards and lost her footing, falling over a chair.  The patient stated that she fell flat on her back and hit her head on the patio door but denied any loss of consciousness.  The fall was witnessed by family and she was helped up and has been ambulatory ever since.  The patient continues to have lower back pain as well as right rib pain that has progressively enhanced since the fall.  She is also developed urinary incontinence over the past week or so which is not normal for her. Sonia La does however deny any bowel incontinence.  She denied any paresthesia or any other genitourinary symptoms leading up to the incontinence.  The patient also reports urgency. Sonia La did however deny any chest pain, abdominal pain, shortness of breath, weakness, tingling, numbness after the fall.  The patient denies any headache, vision changes, lightheadedness, dizziness after hitting her head.  She does report that the fall was strictly mechanical and was asymptomatic prior to the fall.  The hospitalist service was contacted for further evaluation, treatment, management. Subjective (past 24 hours):   Patient sitting up in bed with family at the bedside up at the time of the interview. States that she is feeling fine today and denies any physical complaints. Patient and family were updated on the plan of care for outpatient follow-ups and pain management plans to perform MRI with biopsy, kyphoplasty, and osteo-cool tomorrow. Past medical history, family history, social history and allergies reviewed again and is unchanged since admission. ROS (14 point review of systems completed. Pertinent positives noted. Otherwise ROS is negative) :  GENERAL: No fever,chills, or night sweats. SKIN: No lesions or rashes. HEAD: No headaches or recent injury. EYES: No acute changes in vision, no diplopia or blurred vision. EARS: No hearing loss, no tinnitus. NOSE/THROAT: No rhinorrhea or pharyngitis, no nasal drainage. NECK: No lumps or unusual neck stiffness. PULMONARY: Respirations easy and non-labored, no acute distress. CARDIAC: No chest pain, pressure, Negative for lower leg edema. GI: Abdomen is soft and non-tender, non-distended. PERIPHERAL VASCULAR: No intermittent claudication or unusual leg cramps. MUSCULOSKELETAL: Occasional arthralgias, myalgias. NEUROLOGICAL: Denies any headache, near syncope, seizures or syncope. HEMATOLOGIC:  No unusual bruising or bleeding. PSYCH: Denies any homicidal or suicidial ideations.     Medications:  Reviewed    Infusion Medications    dextrose       Scheduled Medications    insulin lispro  0-6 Units Subcutaneous TID WC    insulin lispro  0-3 Units Subcutaneous Nightly    lidocaine  1 patch Transdermal Daily    metFORMIN  500 mg Oral BID WC    levothyroxine  88 mcg Oral Daily    amitriptyline  25 mg Oral Nightly    pravastatin  20 mg Oral Nightly    doxycycline hyclate  50 mg Oral Daily    calcium elemental  500 mg Oral Daily    docusate sodium  100 mg Oral BID    venlafaxine  75 mg Oral Daily    ramipril  2.5 mg Oral Daily    metoprolol succinate  25 mg Oral Daily    aspirin  81 mg Oral Daily    acetaminophen  650 mg Oral Nightly    sodium chloride flush  10 mL Intravenous 2 times per day    enoxaparin  40 mg Subcutaneous Daily     PRN Meds: acetaminophen, traMADol, sodium chloride flush, magnesium hydroxide, ondansetron, glucose, dextrose, glucagon (rDNA), dextrose      Intake/Output Summary (Last 24 hours) at 1/6/2020 0946  Last data filed at 1/6/2020 0818  Gross per 24 hour   Intake 190 ml   Output --   Net 190 ml       Diet:  DIET GENERAL; Carb Control: 3 carb choices (45 gms)/meal; No Added Salt (3-4 GM)    Exam:  /77   Pulse 88   Temp 98 °F (36.7 °C) (Oral)   Resp 18   Ht 5' 3\" (1.6 m)   Wt 172 lb 12.8 oz (78.4 kg)   SpO2 98%   BMI 30.61 kg/m²     General appearance: Alert and appropriate, pleasant geriatric female.  No apparent distress, appears stated age and cooperative. HEENT: Pupils equal, round, and reactive to light. Conjunctivae/corneas clear. Neck: Supple, with full range of motion. No jugular venous distention. Trachea midline. Respiratory:  Normal respiratory effort. Clear to auscultation, bilaterally without Rales/Wheezes/Rhonchi. Cardiovascular: Regular rate and rhythm with normal S1/S2 without murmurs, rubs or gallops. Abdomen: Soft, non-tender, non-distended with normal bowel sounds. Musculoskeletal: Passive and active ROM x 4 extremities. Skin: Skin color, texture, turgor normal.  No rashes or lesions. Neurologic:  Neurovascularly intact without any focal sensory/motor deficits. Cranial nerves: II-XII intact, grossly non-focal.  Psychiatric: Alert and oriented to person, place, time, and situation.  Thought content appropriate, normal insight  Capillary Refill: Brisk,< 3 seconds   Peripheral Pulses: +2 palpable, equal bilaterally      Labs:   Recent Labs     01/04/20  0320 01/06/20  0453   WBC 8.2 7.7   HGB 12.2 12.8   HCT 38.2 39.1    397     Recent Labs a subtle 4 x 3 mm noncalcified nodule in the lateral right middle lobe (axial image 31). There are a few scattered calcified granulomas. There is bilateral multilobar atelectasis. There is no pneumonia. The trachea and major bronchi are unremarkable. Pleura: No pleural effusion. No pneumothorax. Diaphragm: There is moderate elevation of the right hemidiaphragm. Heart: Heart size is normal. There is no pericardial effusion. Isamar and mediastinum: There is no mass or adenopathy. There are calcified mediastinal and right hilar lymph nodes. There is a small hiatal hernia. Pulmonary vasculature: Unremarkable Thoracic aorta/vascular: No thoracic aortic aneurysm or dissection. The imaged portions of the brachiocephalic arteries are unremarkable. Imaged upper abdomen: See the accompanying abdomen pelvis CT report. Musculoskeletal system: There is multilevel cervical and thoracic degenerative disc disease and endplate spondylosis. There is a mild to moderate compression fracture of T2 of indeterminate age however there is an acute minimally displaced fracture of the anterior aspect of the vertebral body. This is better seen on the accompanying cervical spine CT. There is associated mild prevertebral edema or hemorrhage at this level. . There is an old mild compression fracture of T7. There is mild thoracic levoscoliosis. Chest/body wall soft tissues: There are postoperative changes of the left breast with multiple surgical clips and an adjacent 1.9 x 1.3 cm scar in the medial left breast. Thyroid: There is a 1.5 x 1.3 cm right thyroid nodule. Consider nonemergent ultrasound for further evaluation. Acute minimally displaced fracture of the anterior T2 vertebral body with an age indeterminate mild to moderate compression fracture component. Associated mild prevertebral edema or hemorrhage. 2.3 x 3 cm right upper lobe pulmonary mass consistent with primary lung carcinoma versus metastasis.  8 x 7 mm left upper lobe pulmonary Blake Jaimes on 1/3/2020 10:23 AM    Ct Abdomen Pelvis W Iv Contrast Additional Contrast? None    Result Date: 1/3/2020  PROCEDURE: CT ABDOMEN PELVIS W IV CONTRAST CLINICAL INFORMATION: fall, R lateral rib pain, urinary incontinence, please recon spine . COMPARISON: None. TECHNIQUE: 5 mm axial CT images were obtained through the abdomen and pelvis after the administration of intravenous contrast. Coronal and sagittal reconstructions were obtained. All CT scans at this facility use dose modulation, iterative reconstruction, and/or weight-based dosing when appropriate to reduce radiation dose to as low as reasonably achievable. FINDINGS: Lower chest: See the accompanying chest CT report. Liver: There are scattered calcified granulomas. There is a 4 mm round hypodense lesion in the anterior right lobe of the liver, too small to characterize but possibly a cyst.. There is no liver mass or intrahepatic biliary dilatation. Gallbladder/Biliary tree: Unremarkable. No calcified gallstones. No extrahepatic biliary dilatation. Spleen: There are calcified granulomas. No splenomegaly. Pancreas: There is severe pancreatic atrophy with diffuse severe dilatation of the pancreatic duct. No obstructing process is identified. There are 2 subcentimeter nodular foci in the pancreatic duct in the tail (sagittal image 26, coronal image 27). Neoplasm not excluded. There are no findings to suggest acute pancreatitis. Adrenal glands: There is thickening of the limbs of the adrenal glands. No mass. Kidneys and ureters: There is a 1.2 x 0.9 cm ovoid hypodense lesion in the extreme upper pole of the left kidney, likely a cyst. There is bilateral renal scarring. No hydroureteronephrosis. No renal or ureteral calculi. No findings to suggest acute pyelonephritis. Stomach, small bowel, and colon: There is a small hiatal hernia. Stomach and duodenum are otherwise unremarkable. There is colonic diverticulosis without diverticulitis.  Small bowel and dated 9/19/2016. TECHNIQUE: Axial, sagittal and coronal reformatted images of the lumbar spine reconstructed from the patient's abdomen and pelvis CT. IV contrast is present. All CT scans at this facility use dose modulation, iterative reconstruction, and/or weight-based dosing when appropriate to reduce radiation dose to as low as reasonably achievable. FINDINGS: There is grade 1 anterolisthesis of L4 relative to L5 on the basis of degenerative change, stable compared to prior radiographs. There is otherwise anatomic vertebral body height and alignment. No fracture of the lumbar vertebral column is identified. There is mild osteophytosis at the sacral iliac joints bilaterally. There is disc space narrowing and vacuum disc phenomenon at T8-9 through T11-12 with associated anterior osteophytes. At T12-L1 there is no significant spinal canal or neuroforaminal stenosis. At L1-2 there is a shallow disc bulge without significant spinal canal stenosis and moderate bilateral neural foraminal stenosis in association with mild facet hypertrophy. At L2-3 there is a shallow disc bulge with facet hypertrophy and ligamentum flavum thickening which causes moderate spinal canal stenosis. There is also moderate bilateral foraminal stenosis at this level. At L3-4 there is a shallow disc bulge with prominent facet hypertrophy and ligamentum flavum thickening which causes moderate to severe spinal canal stenosis and moderate to severe left and moderate right neuroforaminal stenosis. At L4-5 there is pronounced facet hypertrophy and ligament flavum thickening causing the anterolisthesis of L4 relative to L5 with partial uncovering the disc and superimposed disc bulge contributing to moderate to severe spinal canal stenosis and mild to moderate left and moderate right neural foraminal stenosis. At L5-S1 there is no significant spinal canal stenosis.  There is mild to moderate bilateral neural foraminal stenosis in association with facet hypertrophy and ligamentum flavum thickening. 1. No evidence of acute osseous injury of the lumbar spine. 2. Multilevel degenerative changes with areas of moderate to severe spinal canal stenosis at L3-4 and L4-5 and up to moderate to severe neuroforaminal stenosis. **This report has been created using voice recognition software. It may contain minor errors which are inherent in voice recognition technology. ** Final report electronically signed by Dr. Diya Dolan MD on 1/3/2020 10:32 AM    Ct Thoracic Reconstruction Wo Post Process    Result Date: 1/3/2020  PROCEDURE: CT THORACIC RECONSTRUCTION WO POST PROCESS CLINICAL INFORMATION: T2 compression fracture seen on Cervical spine CT. History of breast cancer. COMPARISON: CT of the cervical spine from the same date. TECHNIQUE: Axial, sagittal and coronal reformatted images of the thoracic spine reconstructed from the patient's chest CT. IV contrast is present. All CT scans at this facility use dose modulation, iterative reconstruction, and/or weight-based dosing when appropriate to reduce radiation dose to as low as reasonably achievable. FINDINGS: Redemonstration of an anterior wedge shape configuration of the T2 vertebral body with approximately 30% anterior height loss with an acute fracture lucency at the anterior aspect of the body. The posterior elements appear intact. There is a mild anterior wedge shape configuration of the T7 vertebral body with approximately 20% anterior height loss without definite acute fracture lucency. There is an exaggerated thoracic kyphosis. There is a levocurvature of the thoracic spine. There is otherwise  anatomic vertebral body height and alignment. No definite focal lytic or sclerotic lesion is identified within the thoracic spine. Visualized portions of the ribs appear intact. There is vacuum disc phenomenon at T6-7 through T11-12 with disc space narrowing at these levels.  There is endplate sclerosis and mild osteophytosis at T9-10 through T12-L1 anteriorly. There is no significant spinal canal or neuroforaminal stenosis at any thoracic level. 1. Redemonstration of acute compression deformity of T2 with approximately 30% anterior height loss. No definite underlying focal lesion is identified although MRI is much more sensitive for evaluation of metastatic disease. 2. Chronic appearing mild compression deformity of the T7 vertebral body with approximately 20% anterior height loss. 3. Mild degenerative changes of the thoracic spine without significant spinal canal or neuroforaminal stenosis at any thoracic level. **This report has been created using voice recognition software. It may contain minor errors which are inherent in voice recognition technology. ** Final report electronically signed by Dr. Emery Gtz MD on 1/3/2020 3:43 PM    Electronically signed by JAYSON Harkins CNP on 1/6/2020 at 9:46 AM

## 2020-01-07 ENCOUNTER — ANESTHESIA (OUTPATIENT)
Dept: OPERATING ROOM | Age: 85
DRG: 478 | End: 2020-01-07
Payer: MEDICARE

## 2020-01-07 ENCOUNTER — ANESTHESIA EVENT (OUTPATIENT)
Dept: OPERATING ROOM | Age: 85
DRG: 478 | End: 2020-01-07
Payer: MEDICARE

## 2020-01-07 ENCOUNTER — APPOINTMENT (OUTPATIENT)
Dept: GENERAL RADIOLOGY | Age: 85
DRG: 478 | End: 2020-01-07
Payer: MEDICARE

## 2020-01-07 VITALS
RESPIRATION RATE: 3 BRPM | TEMPERATURE: 97.9 F | OXYGEN SATURATION: 89 % | DIASTOLIC BLOOD PRESSURE: 74 MMHG | SYSTOLIC BLOOD PRESSURE: 176 MMHG

## 2020-01-07 LAB
GLUCOSE BLD-MCNC: 118 MG/DL (ref 70–108)
GLUCOSE BLD-MCNC: 129 MG/DL (ref 70–108)
GLUCOSE BLD-MCNC: 167 MG/DL (ref 70–108)
GLUCOSE BLD-MCNC: 177 MG/DL (ref 70–108)
GLUCOSE BLD-MCNC: 188 MG/DL (ref 70–108)
GLUCOSE BLD-MCNC: 69 MG/DL (ref 70–108)

## 2020-01-07 PROCEDURE — 93005 ELECTROCARDIOGRAM TRACING: CPT | Performed by: INTERNAL MEDICINE

## 2020-01-07 PROCEDURE — 2580000003 HC RX 258: Performed by: FAMILY MEDICINE

## 2020-01-07 PROCEDURE — 6360000004 HC RX CONTRAST MEDICATION: Performed by: PAIN MEDICINE

## 2020-01-07 PROCEDURE — 0PB40ZX EXCISION OF THORACIC VERTEBRA, OPEN APPROACH, DIAGNOSTIC: ICD-10-PCS | Performed by: INTERNAL MEDICINE

## 2020-01-07 PROCEDURE — 97110 THERAPEUTIC EXERCISES: CPT

## 2020-01-07 PROCEDURE — 82948 REAGENT STRIP/BLOOD GLUCOSE: CPT

## 2020-01-07 PROCEDURE — 6370000000 HC RX 637 (ALT 250 FOR IP): Performed by: FAMILY MEDICINE

## 2020-01-07 PROCEDURE — 0PU43JZ SUPPLEMENT THORACIC VERTEBRA WITH SYNTHETIC SUBSTITUTE, PERCUTANEOUS APPROACH: ICD-10-PCS | Performed by: INTERNAL MEDICINE

## 2020-01-07 PROCEDURE — 3700000001 HC ADD 15 MINUTES (ANESTHESIA): Performed by: PAIN MEDICINE

## 2020-01-07 PROCEDURE — 3600000003 HC SURGERY LEVEL 3 BASE: Performed by: PAIN MEDICINE

## 2020-01-07 PROCEDURE — 6360000002 HC RX W HCPCS: Performed by: REGISTERED NURSE

## 2020-01-07 PROCEDURE — 2709999900 HC NON-CHARGEABLE SUPPLY: Performed by: PAIN MEDICINE

## 2020-01-07 PROCEDURE — 6370000000 HC RX 637 (ALT 250 FOR IP): Performed by: PAIN MEDICINE

## 2020-01-07 PROCEDURE — 88305 TISSUE EXAM BY PATHOLOGIST: CPT

## 2020-01-07 PROCEDURE — 2500000003 HC RX 250 WO HCPCS: Performed by: REGISTERED NURSE

## 2020-01-07 PROCEDURE — 3600000013 HC SURGERY LEVEL 3 ADDTL 15MIN: Performed by: PAIN MEDICINE

## 2020-01-07 PROCEDURE — 88311 DECALCIFY TISSUE: CPT

## 2020-01-07 PROCEDURE — 2500000003 HC RX 250 WO HCPCS: Performed by: PAIN MEDICINE

## 2020-01-07 PROCEDURE — 6370000000 HC RX 637 (ALT 250 FOR IP): Performed by: NURSE PRACTITIONER

## 2020-01-07 PROCEDURE — 2580000003 HC RX 258: Performed by: REGISTERED NURSE

## 2020-01-07 PROCEDURE — 3700000000 HC ANESTHESIA ATTENDED CARE: Performed by: PAIN MEDICINE

## 2020-01-07 PROCEDURE — 7100000000 HC PACU RECOVERY - FIRST 15 MIN: Performed by: PAIN MEDICINE

## 2020-01-07 PROCEDURE — 3209999900 FLUORO FOR SURGICAL PROCEDURES

## 2020-01-07 PROCEDURE — 97530 THERAPEUTIC ACTIVITIES: CPT

## 2020-01-07 PROCEDURE — 7100000001 HC PACU RECOVERY - ADDTL 15 MIN: Performed by: PAIN MEDICINE

## 2020-01-07 PROCEDURE — 2720000010 HC SURG SUPPLY STERILE: Performed by: PAIN MEDICINE

## 2020-01-07 PROCEDURE — 22513 PERQ VERTEBRAL AUGMENTATION: CPT | Performed by: PAIN MEDICINE

## 2020-01-07 PROCEDURE — 1200000000 HC SEMI PRIVATE

## 2020-01-07 PROCEDURE — C1713 ANCHOR/SCREW BN/BN,TIS/BN: HCPCS | Performed by: PAIN MEDICINE

## 2020-01-07 PROCEDURE — C1894 INTRO/SHEATH, NON-LASER: HCPCS | Performed by: PAIN MEDICINE

## 2020-01-07 PROCEDURE — 94761 N-INVAS EAR/PLS OXIMETRY MLT: CPT

## 2020-01-07 PROCEDURE — 99232 SBSQ HOSP IP/OBS MODERATE 35: CPT | Performed by: NURSE PRACTITIONER

## 2020-01-07 PROCEDURE — 97162 PT EVAL MOD COMPLEX 30 MIN: CPT

## 2020-01-07 PROCEDURE — 2709999900 HC NON-CHARGEABLE SUPPLY

## 2020-01-07 PROCEDURE — 0PS43ZZ REPOSITION THORACIC VERTEBRA, PERCUTANEOUS APPROACH: ICD-10-PCS | Performed by: INTERNAL MEDICINE

## 2020-01-07 DEVICE — BONE CEMENT CX01B KYPHON XPEDE W MXR US
Type: IMPLANTABLE DEVICE | Site: SPINE THORACIC | Status: FUNCTIONAL
Brand: KYPHON® XPEDE™ BONE CEMENT AND KYPHON® MIXER PACK

## 2020-01-07 RX ORDER — METOCLOPRAMIDE HYDROCHLORIDE 5 MG/ML
INJECTION INTRAMUSCULAR; INTRAVENOUS PRN
Status: DISCONTINUED | OUTPATIENT
Start: 2020-01-07 | End: 2020-01-07 | Stop reason: SDUPTHER

## 2020-01-07 RX ORDER — NEOSTIGMINE METHYLSULFATE 5 MG/5 ML
SYRINGE (ML) INTRAVENOUS PRN
Status: DISCONTINUED | OUTPATIENT
Start: 2020-01-07 | End: 2020-01-07 | Stop reason: SDUPTHER

## 2020-01-07 RX ORDER — MIDAZOLAM HYDROCHLORIDE 1 MG/ML
1 INJECTION INTRAMUSCULAR; INTRAVENOUS PRN
Status: CANCELLED | OUTPATIENT
Start: 2020-01-07

## 2020-01-07 RX ORDER — PROMETHAZINE HYDROCHLORIDE 25 MG/ML
12.5 INJECTION, SOLUTION INTRAMUSCULAR; INTRAVENOUS
Status: DISCONTINUED | OUTPATIENT
Start: 2020-01-07 | End: 2020-01-07 | Stop reason: HOSPADM

## 2020-01-07 RX ORDER — VASOPRESSIN 20 U/ML
INJECTION PARENTERAL PRN
Status: DISCONTINUED | OUTPATIENT
Start: 2020-01-07 | End: 2020-01-07 | Stop reason: SDUPTHER

## 2020-01-07 RX ORDER — LIDOCAINE HCL/PF 100 MG/5ML
SYRINGE (ML) INJECTION PRN
Status: DISCONTINUED | OUTPATIENT
Start: 2020-01-07 | End: 2020-01-07 | Stop reason: SDUPTHER

## 2020-01-07 RX ORDER — LABETALOL 20 MG/4 ML (5 MG/ML) INTRAVENOUS SYRINGE
10 EVERY 10 MIN PRN
Status: DISCONTINUED | OUTPATIENT
Start: 2020-01-07 | End: 2020-01-07 | Stop reason: HOSPADM

## 2020-01-07 RX ORDER — ONDANSETRON 2 MG/ML
INJECTION INTRAMUSCULAR; INTRAVENOUS PRN
Status: DISCONTINUED | OUTPATIENT
Start: 2020-01-07 | End: 2020-01-07 | Stop reason: SDUPTHER

## 2020-01-07 RX ORDER — FENTANYL CITRATE 50 UG/ML
INJECTION, SOLUTION INTRAMUSCULAR; INTRAVENOUS PRN
Status: DISCONTINUED | OUTPATIENT
Start: 2020-01-07 | End: 2020-01-07 | Stop reason: SDUPTHER

## 2020-01-07 RX ORDER — DEXAMETHASONE SODIUM PHOSPHATE 4 MG/ML
INJECTION, SOLUTION INTRA-ARTICULAR; INTRALESIONAL; INTRAMUSCULAR; INTRAVENOUS; SOFT TISSUE PRN
Status: DISCONTINUED | OUTPATIENT
Start: 2020-01-07 | End: 2020-01-07 | Stop reason: SDUPTHER

## 2020-01-07 RX ORDER — SODIUM CHLORIDE 450 MG/100ML
INJECTION, SOLUTION INTRAVENOUS CONTINUOUS
Status: CANCELLED | OUTPATIENT
Start: 2020-01-07

## 2020-01-07 RX ORDER — FENTANYL CITRATE 50 UG/ML
25 INJECTION, SOLUTION INTRAMUSCULAR; INTRAVENOUS EVERY 5 MIN PRN
Status: DISCONTINUED | OUTPATIENT
Start: 2020-01-07 | End: 2020-01-07 | Stop reason: HOSPADM

## 2020-01-07 RX ORDER — FENTANYL CITRATE 50 UG/ML
50 INJECTION, SOLUTION INTRAMUSCULAR; INTRAVENOUS EVERY 5 MIN PRN
Status: DISCONTINUED | OUTPATIENT
Start: 2020-01-07 | End: 2020-01-07 | Stop reason: HOSPADM

## 2020-01-07 RX ORDER — SODIUM CHLORIDE, SODIUM GLUCONATE, SODIUM ACETATE, POTASSIUM CHLORIDE AND MAGNESIUM CHLORIDE 526; 502; 368; 37; 30 MG/100ML; MG/100ML; MG/100ML; MG/100ML; MG/100ML
INJECTION, SOLUTION INTRAVENOUS CONTINUOUS PRN
Status: DISCONTINUED | OUTPATIENT
Start: 2020-01-07 | End: 2020-01-07 | Stop reason: SDUPTHER

## 2020-01-07 RX ORDER — ROCURONIUM BROMIDE 10 MG/ML
INJECTION, SOLUTION INTRAVENOUS PRN
Status: DISCONTINUED | OUTPATIENT
Start: 2020-01-07 | End: 2020-01-07 | Stop reason: SDUPTHER

## 2020-01-07 RX ORDER — FENTANYL CITRATE 50 UG/ML
50 INJECTION, SOLUTION INTRAMUSCULAR; INTRAVENOUS ONCE
Status: CANCELLED | OUTPATIENT
Start: 2020-01-08

## 2020-01-07 RX ORDER — CEFAZOLIN SODIUM 1 G/3ML
INJECTION, POWDER, FOR SOLUTION INTRAMUSCULAR; INTRAVENOUS PRN
Status: DISCONTINUED | OUTPATIENT
Start: 2020-01-07 | End: 2020-01-07 | Stop reason: SDUPTHER

## 2020-01-07 RX ORDER — GLYCOPYRROLATE 1 MG/5 ML
SYRINGE (ML) INTRAVENOUS PRN
Status: DISCONTINUED | OUTPATIENT
Start: 2020-01-07 | End: 2020-01-07 | Stop reason: SDUPTHER

## 2020-01-07 RX ORDER — PROPOFOL 10 MG/ML
INJECTION, EMULSION INTRAVENOUS PRN
Status: DISCONTINUED | OUTPATIENT
Start: 2020-01-07 | End: 2020-01-07 | Stop reason: SDUPTHER

## 2020-01-07 RX ADMIN — VASOPRESSIN 2 UNITS: 20 INJECTION INTRAVENOUS at 13:45

## 2020-01-07 RX ADMIN — INSULIN LISPRO 2 UNITS: 100 INJECTION, SOLUTION INTRAVENOUS; SUBCUTANEOUS at 18:11

## 2020-01-07 RX ADMIN — FAMOTIDINE 20 MG: 10 INJECTION, SOLUTION INTRAVENOUS at 13:13

## 2020-01-07 RX ADMIN — INSULIN LISPRO 2 UNITS: 100 INJECTION, SOLUTION INTRAVENOUS; SUBCUTANEOUS at 09:21

## 2020-01-07 RX ADMIN — CALCIUM 500 MG: 500 TABLET ORAL at 09:16

## 2020-01-07 RX ADMIN — VASOPRESSIN 2 UNITS: 20 INJECTION INTRAVENOUS at 13:42

## 2020-01-07 RX ADMIN — METOPROLOL SUCCINATE 25 MG: 25 TABLET, FILM COATED, EXTENDED RELEASE ORAL at 09:16

## 2020-01-07 RX ADMIN — DEXTROSE MONOHYDRATE 12.5 G: 25 INJECTION, SOLUTION INTRAVENOUS at 12:31

## 2020-01-07 RX ADMIN — RAMIPRIL 2.5 MG: 2.5 CAPSULE ORAL at 09:15

## 2020-01-07 RX ADMIN — AMITRIPTYLINE HYDROCHLORIDE 25 MG: 25 TABLET, FILM COATED ORAL at 21:11

## 2020-01-07 RX ADMIN — ONDANSETRON HYDROCHLORIDE 4 MG: 4 INJECTION, SOLUTION INTRAMUSCULAR; INTRAVENOUS at 14:23

## 2020-01-07 RX ADMIN — INSULIN LISPRO 5 UNITS: 100 INJECTION, SOLUTION INTRAVENOUS; SUBCUTANEOUS at 09:23

## 2020-01-07 RX ADMIN — INSULIN LISPRO 5 UNITS: 100 INJECTION, SOLUTION INTRAVENOUS; SUBCUTANEOUS at 18:09

## 2020-01-07 RX ADMIN — DOCUSATE SODIUM 100 MG: 100 CAPSULE, LIQUID FILLED ORAL at 09:16

## 2020-01-07 RX ADMIN — METFORMIN HYDROCHLORIDE 500 MG: 500 TABLET ORAL at 09:16

## 2020-01-07 RX ADMIN — PRAVASTATIN SODIUM 20 MG: 20 TABLET ORAL at 21:11

## 2020-01-07 RX ADMIN — INSULIN LISPRO 1 UNITS: 100 INJECTION, SOLUTION INTRAVENOUS; SUBCUTANEOUS at 21:12

## 2020-01-07 RX ADMIN — METFORMIN HYDROCHLORIDE 500 MG: 500 TABLET ORAL at 18:11

## 2020-01-07 RX ADMIN — Medication 10 ML: at 21:11

## 2020-01-07 RX ADMIN — SODIUM CHLORIDE, SODIUM GLUCONATE, SODIUM ACETATE, POTASSIUM CHLORIDE AND MAGNESIUM CHLORIDE: 526; 502; 368; 37; 30 INJECTION, SOLUTION INTRAVENOUS at 13:36

## 2020-01-07 RX ADMIN — METOCLOPRAMIDE 10 MG: 5 INJECTION, SOLUTION INTRAMUSCULAR; INTRAVENOUS at 13:13

## 2020-01-07 RX ADMIN — ACETAMINOPHEN 650 MG: 325 TABLET ORAL at 21:14

## 2020-01-07 RX ADMIN — PHENYLEPHRINE HYDROCHLORIDE 200 MCG: 10 INJECTION INTRAVENOUS at 13:17

## 2020-01-07 RX ADMIN — CEFAZOLIN 2000 MG: 1 INJECTION, POWDER, FOR SOLUTION INTRAMUSCULAR; INTRAVENOUS; PARENTERAL at 13:19

## 2020-01-07 RX ADMIN — VENLAFAXINE 75 MG: 37.5 TABLET ORAL at 09:15

## 2020-01-07 RX ADMIN — Medication 0.6 MG: at 14:23

## 2020-01-07 RX ADMIN — SODIUM CHLORIDE, SODIUM GLUCONATE, SODIUM ACETATE, POTASSIUM CHLORIDE AND MAGNESIUM CHLORIDE: 526; 502; 368; 37; 30 INJECTION, SOLUTION INTRAVENOUS at 13:00

## 2020-01-07 RX ADMIN — PHENYLEPHRINE HYDROCHLORIDE 200 MCG: 10 INJECTION INTRAVENOUS at 13:36

## 2020-01-07 RX ADMIN — VASOPRESSIN 2 UNITS: 20 INJECTION INTRAVENOUS at 14:11

## 2020-01-07 RX ADMIN — PHENYLEPHRINE HYDROCHLORIDE 200 MCG: 10 INJECTION INTRAVENOUS at 13:23

## 2020-01-07 RX ADMIN — DEXAMETHASONE SODIUM PHOSPHATE 8 MG: 4 INJECTION, SOLUTION INTRAMUSCULAR; INTRAVENOUS at 13:13

## 2020-01-07 RX ADMIN — FENTANYL CITRATE 100 MCG: 50 INJECTION INTRAMUSCULAR; INTRAVENOUS at 13:08

## 2020-01-07 RX ADMIN — ROCURONIUM BROMIDE 20 MG: 10 INJECTION INTRAVENOUS at 13:08

## 2020-01-07 RX ADMIN — DOXYCYCLINE HYCLATE 50 MG: 100 TABLET, COATED ORAL at 09:16

## 2020-01-07 RX ADMIN — Medication 4 MG: at 14:23

## 2020-01-07 RX ADMIN — ROCURONIUM BROMIDE 10 MG: 10 INJECTION INTRAVENOUS at 13:46

## 2020-01-07 RX ADMIN — PROPOFOL 100 MG: 10 INJECTION, EMULSION INTRAVENOUS at 13:08

## 2020-01-07 RX ADMIN — Medication 10 ML: at 09:18

## 2020-01-07 RX ADMIN — Medication 60 MG: at 13:08

## 2020-01-07 RX ADMIN — Medication 0.2 MG: at 13:08

## 2020-01-07 RX ADMIN — LEVOTHYROXINE SODIUM 88 MCG: 88 TABLET ORAL at 06:36

## 2020-01-07 ASSESSMENT — PULMONARY FUNCTION TESTS
PIF_VALUE: 26
PIF_VALUE: 1
PIF_VALUE: 19
PIF_VALUE: 19
PIF_VALUE: 17
PIF_VALUE: 20
PIF_VALUE: 17
PIF_VALUE: 19
PIF_VALUE: 19
PIF_VALUE: 20
PIF_VALUE: 19
PIF_VALUE: 19
PIF_VALUE: 2
PIF_VALUE: 2
PIF_VALUE: 19
PIF_VALUE: 19
PIF_VALUE: 2
PIF_VALUE: 19
PIF_VALUE: 19
PIF_VALUE: 1
PIF_VALUE: 20
PIF_VALUE: 18
PIF_VALUE: 19
PIF_VALUE: 20
PIF_VALUE: 19
PIF_VALUE: 19
PIF_VALUE: 1
PIF_VALUE: 1
PIF_VALUE: 19
PIF_VALUE: 3
PIF_VALUE: 19
PIF_VALUE: 19
PIF_VALUE: 1
PIF_VALUE: 20
PIF_VALUE: 20
PIF_VALUE: 11
PIF_VALUE: 19
PIF_VALUE: 1
PIF_VALUE: 17
PIF_VALUE: 19
PIF_VALUE: 2
PIF_VALUE: 19
PIF_VALUE: 2
PIF_VALUE: 1
PIF_VALUE: 19
PIF_VALUE: 19
PIF_VALUE: 1
PIF_VALUE: 19
PIF_VALUE: 3
PIF_VALUE: 20
PIF_VALUE: 19
PIF_VALUE: 1
PIF_VALUE: 19
PIF_VALUE: 20
PIF_VALUE: 3
PIF_VALUE: 1
PIF_VALUE: 20
PIF_VALUE: 3
PIF_VALUE: 19
PIF_VALUE: 29
PIF_VALUE: 19
PIF_VALUE: 19
PIF_VALUE: 18
PIF_VALUE: 18
PIF_VALUE: 19
PIF_VALUE: 17
PIF_VALUE: 20
PIF_VALUE: 19
PIF_VALUE: 3
PIF_VALUE: 19
PIF_VALUE: 3
PIF_VALUE: 19
PIF_VALUE: 3
PIF_VALUE: 19
PIF_VALUE: 17

## 2020-01-07 ASSESSMENT — PAIN DESCRIPTION - LOCATION: LOCATION: RIB CAGE

## 2020-01-07 ASSESSMENT — PAIN SCALES - GENERAL
PAINLEVEL_OUTOF10: 4
PAINLEVEL_OUTOF10: 0
PAINLEVEL_OUTOF10: 0

## 2020-01-07 ASSESSMENT — PAIN DESCRIPTION - ORIENTATION: ORIENTATION: RIGHT

## 2020-01-07 ASSESSMENT — PAIN DESCRIPTION - FREQUENCY: FREQUENCY: INTERMITTENT

## 2020-01-07 ASSESSMENT — PAIN DESCRIPTION - PROGRESSION
CLINICAL_PROGRESSION: NOT CHANGED

## 2020-01-07 ASSESSMENT — PAIN DESCRIPTION - PAIN TYPE: TYPE: ACUTE PAIN

## 2020-01-07 ASSESSMENT — PAIN - FUNCTIONAL ASSESSMENT: PAIN_FUNCTIONAL_ASSESSMENT: ACTIVITIES ARE NOT PREVENTED

## 2020-01-07 ASSESSMENT — PAIN DESCRIPTION - DIRECTION: RADIATING_TOWARDS: TO BACK

## 2020-01-07 ASSESSMENT — PAIN DESCRIPTION - DESCRIPTORS: DESCRIPTORS: DISCOMFORT

## 2020-01-07 ASSESSMENT — PAIN DESCRIPTION - ONSET: ONSET: ON-GOING

## 2020-01-07 NOTE — PROGRESS NOTES
Hospitalist Progress Note      Patient:  Flavia Robertson    Unit/Bed:8A-09/009-A  YOB: 1934  MRN: [id number]   Acct: [de-identified]   PCP: Roberto Fox MD  Date of Admission: 1/3/2020    Assessment/Plan:    1. Intractable right lower chest wall pain with RUQ pain: Improved. CT abdomen/pelvis demonstrated severe pancreatic atrophy with marked diffuse pancreatic ductal dilatation. 2 subcentimeter nodular foci in the duct in the pancreatic tail. Neoplasm not excluded.  MRCP conducted on 1/4/2020 reports pancreatic atrophy as well as moderate ductal dilation.  Multiple tiny gallstones are noted.  May use tylenol 650 q6h PRN for moderate pain and tramadol 50 mg q12h for severe pain. Currently denies any pain or discomfort at this time. 2. Type 2 diabetes mellitus, uncontrolled: Current hemoglobin A1c 10.2.  Accu-Cheks before meals and at bedtime with sliding scale insulin coverage (adjusted to medium-dose corrective algorithm). Please give 5 units of Humalog before meals. Continue carb controlled diet. Continue metformin. 3. Acute minimally displaced fracture of the anterior T2 vertebral body with an age indeterminate mild to moderate compression fracture component: Orthopedic surgery following. Pain management planning for MRI of the thoracic spine with possible kyphoplasty, biopsy, and osteocool. Patient scored 0 points on the Revised Cardiac Risk Index, patient has a low (3.9%) risk of cardiac events in a low-risk surgery. 4. 2.3x3 cm right upper lobe pulmonary mass consistent with primary lung carcinoma versus metastasis: Oncology following. 5. 8x7 mm left upper lobe pulmonary nodule: Oncology following. 6. 1.5x1.3 cm right thyroid nodule: Outpatient ultrasound will need to be conducted. 7. History of left breast cancer status post lumpectomy and radiation therapy completed 2018: Oncology following.   8. Urinary incontinence: Relatively new onset. No saddle anesthesia, radiculopathy, lower back pain, or bowel incontinence, monitor closely with I&O's.  Attempt Keagle exercises.  We will continue to monitor. 9. Hypothyroidism: Stable, continue Synthroid. 10. Hypertension: Stable, continue metoprolol.  Cardiac, low-sodium salt diet. 11. Hyperlipidemia: Continue pravastatin. 12. Slight ALP elevation: Likely secondary to gallstones. 13. Chronic kidney disease stage II: Stable.  Current creatinine 0.9.  Avoid nephrotoxic agents. Chief Complaint: Intractable pain    Initial H and P:-    Radha Kumar an 70-year-old  female, lifetime non-smoker, with a medical history of spinal stenosis, hypothyroidism, hypertension, hyperlipidemia, diabetes mellitus, depression, cancer of the left breast, and arthritis.   Patient presents [facility] ER for a reevaluation of prior complaints/symptoms that resulted from a fall taking place on 12/21/2019.  The patient was at a [employer] party, standing in the kitchen, when she stepped backwards and lost her footing, falling over a chair.  The patient stated that she fell flat on her back and hit her head on the patio door but denied any loss of consciousness.  The fall was witnessed by family and she was helped up and has been ambulatory ever since.  The patient continues to have lower back pain as well as right rib pain that has progressively enhanced since the fall.  She is also developed urinary incontinence over the past week or so which is not normal for her. Ricardoyael Nyeef does however deny any bowel incontinence.  She denied any paresthesia or any other genitourinary symptoms leading up to the incontinence.  The patient also reports urgency. Shannan Arriaza did however deny any chest pain, abdominal pain, shortness of breath, weakness, tingling, numbness after the fall.  The patient denies any headache, vision changes, lightheadedness, dizziness after hitting her head. Shannan Arriaza does report that the fall was strictly mechanical and was asymptomatic prior to the fall.  The hospitalist service was contacted for further evaluation, treatment, management. Subjective (past 24 hours):   Patient resting in bed at the time of the interview, alert and oriented to person, place, time, and situation. States that she is ready for her surgery, denies any complaints, and requested mouth swabs because her mouth was dry due to being NPO. Currently denies any chest pain, shortness of breath, nausea, vomiting, abdominal pain, diarrhea, constipation, lightheadedness, dizziness, headaches, or changes in vision. The patient was updated on the plan of care and had no other needs or complaints at this time. Past medical history, family history, social history and allergies reviewed again and is unchanged since admission. ROS (14 point review of systems completed. Pertinent positives noted. Otherwise ROS is negative) :  GENERAL: No fever,chills, or night sweats. SKIN: No lesions or rashes. HEAD: No headaches or recent injury. EYES: No acute changes in vision, no diplopia or blurred vision. EARS: No hearing loss, no tinnitus. NOSE/THROAT: No rhinorrhea or pharyngitis, no nasal drainage. NECK: No lumps or unusual neck stiffness. PULMONARY: Respirations easy and non-labored, no acute distress. CARDIAC: No chest pain, pressure, Negative for lower leg edema. GI: Abdomen is soft and non-tender, non-distended. PERIPHERAL VASCULAR: No intermittent claudication or unusual leg cramps. MUSCULOSKELETAL: Occasional arthralgias, myalgias. NEUROLOGICAL: Denies any headache, near syncope, seizures or syncope. HEMATOLOGIC:  No unusual bruising or bleeding. PSYCH: Denies any homicidal or suicidial ideations.     Medications:  Reviewed    Infusion Medications    dextrose       Scheduled Medications    insulin lispro  0-6 Units Subcutaneous TID WC    insulin lispro  0-3 Units Subcutaneous Nightly    lidocaine  1 patch Transdermal Daily    metFORMIN  500 mg Oral BID     levothyroxine  88 mcg Oral Daily    amitriptyline  25 mg Oral Nightly    pravastatin  20 mg Oral Nightly    doxycycline hyclate  50 mg Oral Daily    calcium elemental  500 mg Oral Daily    docusate sodium  100 mg Oral BID    venlafaxine  75 mg Oral Daily    ramipril  2.5 mg Oral Daily    metoprolol succinate  25 mg Oral Daily    aspirin  81 mg Oral Daily    acetaminophen  650 mg Oral Nightly    sodium chloride flush  10 mL Intravenous 2 times per day     PRN Meds: acetaminophen, traMADol, sodium chloride flush, magnesium hydroxide, ondansetron, glucose, dextrose, glucagon (rDNA), dextrose      Intake/Output Summary (Last 24 hours) at 1/7/2020 0752  Last data filed at 1/7/2020 0459  Gross per 24 hour   Intake 290 ml   Output 0 ml   Net 290 ml       Diet:  Diet NPO, After Midnight    Exam:  BP (!) 143/73   Pulse 74   Temp 98.1 °F (36.7 °C) (Oral)   Resp 18   Ht 5' 3\" (1.6 m)   Wt 172 lb 12.8 oz (78.4 kg)   SpO2 96%   BMI 30.61 kg/m²     General appearance: Alert and appropriate, pleasant geriatric female.  No apparent distress, appears stated age and cooperative. HEENT: Pupils equal, round, and reactive to light. Conjunctivae/corneas clear. Neck: Supple, with full range of motion. No jugular venous distention. Trachea midline. Respiratory:  Normal respiratory effort. Clear to auscultation, bilaterally without Rales/Wheezes/Rhonchi. Cardiovascular: Regular rate and rhythm with normal S1/S2 without murmurs, rubs or gallops. Abdomen: Soft, non-tender, non-distended with normal bowel sounds. Musculoskeletal: Passive and active ROM x 4 extremities. Skin: Skin color, texture, turgor normal.  No rashes or lesions. Neurologic:  Neurovascularly intact without any focal sensory/motor deficits. Cranial nerves: II-XII intact, grossly non-focal.  Psychiatric: Alert and oriented to person, place, time, and situation.  Thought content appropriate, normal insight  Capillary Refill: Brisk,< 3 seconds   Peripheral Pulses: +2 palpable, equal bilaterally    Labs:   Recent Labs     01/06/20  0453   WBC 7.7   HGB 12.8   HCT 39.1        Recent Labs     01/06/20  0453      K 4.3      CO2 22*   BUN 25*   CREATININE 0.9   CALCIUM 9.1     No results for input(s): AST, ALT, BILIDIR, BILITOT, ALKPHOS in the last 72 hours. No results for input(s): INR in the last 72 hours. No results for input(s): [place] in the last 72 hours. Microbiology:    Blood culture #1: No results found for: BC    Blood culture #2:No results found for: Seth Keota    Organism:No results found for: ORG    No results found for: LABGRAM    MRSA culture only:No results found for: [address] [place] [address]    Urine culture:   Lab Results   Component Value Date    LABURIN No growth-preliminary No growth  01/03/2020       Respiratory culture: No results found for: CULTRESP    Aerobic and Anaerobic :  No results found for: LABAERO  No results found for: LABANAE    Urinalysis:      Lab Results   Component Value Date    NITRU NEGATIVE 01/03/2020    WBCUA 0-2 01/03/2020    BACTERIA NONE SEEN 01/03/2020    RBCUA 0-2 01/03/2020    BLOODU NEGATIVE 01/03/2020    SPECGRAV >1.030 01/03/2020       Radiology:  MRI THORACIC SPINE WO CONTRAST   Final Result       1. Acute compression fracture of T2. There is 30% height loss. There is abnormal marrow signal in the vertebral body. This is indeterminate. There is no associated soft tissue mass or epidural component. This may be related to underlying osteoporosis. Metastatic disease cannot be excluded. 2. No other sites of abnormal marrow signal are noted. 3. Degenerative changes throughout the thoracic spine without spinal canal stenosis. 4. Right lung mass. **This report has been created using voice recognition software. It may contain minor errors which are inherent in voice recognition technology. **      Final report electronically signed by Dr. Jannette Johnson on 1/6/2020 2:59 PM      MRI Abdomen W WO Contrast Mrcp   Final Result      A gross pancreatic mass is not seen. There is pancreatic atrophy as well as moderate ductal dilatation. Multiple tiny gallstones are noted      **This report has been created using voice recognition software. It may contain minor errors which are inherent in voice recognition technology. **      Final report electronically signed by Dr. Ren Cotton on 1/4/2020 2:00 PM      CT THORACIC RECONSTRUCTION WO POST PROCESS   Final Result      1. Redemonstration of acute compression deformity of T2 with approximately 30% anterior height loss. No definite underlying focal lesion is identified although MRI is much more sensitive for evaluation of metastatic disease. 2. Chronic appearing mild compression deformity of the T7 vertebral body with approximately 20% anterior height loss. 3. Mild degenerative changes of the thoracic spine without significant spinal canal or neuroforaminal stenosis at any thoracic level. **This report has been created using voice recognition software. It may contain minor errors which are inherent in voice recognition technology. **      Final report electronically signed by Dr. Yoan Parker MD on 1/3/2020 3:43 PM      CT ABDOMEN PELVIS W IV CONTRAST Additional Contrast? None   Final Result   No acute traumatic abnormality of the solid or hollow viscera of the abdomen and pelvis. No acute inflammatory or infectious process in the abdomen or pelvis. Colonic diverticulosis without diverticulitis. Severe pancreatic atrophy with marked diffuse pancreatic ductal dilatation. 2 subcentimeter nodular foci in the duct in the pancreatic tail. Neoplasm not excluded. Additional nonemergent findings as detailed above. **This report has been created using voice recognition software. It may contain minor errors which are inherent in voice recognition technology. **      Final report electronically signed by Dr. Annabel Cheadle on 1/3/2020 10:55 AM      CT Head WO Contrast   Final Result      No acute ischemic infarct, hemorrhage, or mass effect. Aging changes as discussed above. **This report has been created using voice recognition software. It may contain minor errors which are inherent in voice recognition technology. **      Final report electronically signed by Dr. Annabel Cheadle on 1/3/2020 10:22 AM      CT CERVICAL SPINE WO CONTRAST   Final Result   Acute anterior wedge compression fracture of T2 with no central canal encroachment. **This report has been created using voice recognition software. It may contain minor errors which are inherent in voice recognition technology. **      Final report electronically signed by Dr. Lanetta Boast on 1/3/2020 10:23 AM      CT LUMBAR RECONSTRUCTION WO POST PROCESS   Final Result       1. No evidence of acute osseous injury of the lumbar spine. 2. Multilevel degenerative changes with areas of moderate to severe spinal canal stenosis at L3-4 and L4-5 and up to moderate to severe neuroforaminal stenosis. **This report has been created using voice recognition software. It may contain minor errors which are inherent in voice recognition technology. **      Final report electronically signed by Dr. Harish De Paz MD on 1/3/2020 10:32 AM      CT CHEST W CONTRAST   Final Result   Acute minimally displaced fracture of the anterior T2 vertebral body with an age indeterminate mild to moderate compression fracture component. Associated mild prevertebral edema or hemorrhage. 2.3 x 3 cm right upper lobe pulmonary mass consistent with primary lung carcinoma versus metastasis. 8 x 7 mm left upper lobe pulmonary nodule. No acute traumatic abnormality of the chest.   No acute pneumonia. Bilateral multilobar atelectasis. 1.5 x 1.3 cm right thyroid nodule, consider nonemergent ultrasound for further evaluation. mastectomy. There are left lateral chest wall surgical clips. 2.4 x 2.3 cm right upper lobe mass. Recommend chest CT for further evaluation. 3 to 4 mm right basilar pulmonary nodule, possibly a calcified granuloma. Elevated right diaphragm with mild right basilar atelectasis. Minimal mid left lung atelectasis or scarring. No acute pneumonia. **This report has been created using voice recognition software. It may contain minor errors which are inherent in voice recognition technology. ** Final report electronically signed by Dr. Darwin Guerrero on 1/3/2020 9:05 AM    Ct Head Wo Contrast    Result Date: 1/3/2020  PROCEDURE: CT HEAD WO CONTRAST CLINICAL INFORMATION: fall, incontinence. COMPARISON: None TECHNIQUE: Noncontrast 5 mm axial images were obtained through the brain. Sagittal and coronal reconstructions were obtained and reviewed. All CT scans at this facility use dose modulation, iterative reconstruction, and/or weight-based dosing when appropriate to reduce radiation dose to as low as reasonably achievable. FINDINGS: Brain: There is no acute ischemic infarct, hemorrhage, midline shift, mass, or mass effect. There are mild deep white matter hypodensities, nonspecific in nature but probably representing small vessel chronic ischemic changes. There is age appropriate cortical atrophy. Ventricles/basal cisterns: The ventricles and cisterns are of appropriate size and configuration for the patient's age. No evidence of obstructive hydrocephalus. Vascular: There are atherosclerotic calcifications in the bilateral vertebral and cavernous internal carotid arteries. Skull base/calvarium/osseous structures: Unremarkable Soft tissues: Unremarkable Intraorbital contents: Unremarkable Sinuses: Unremarkable; the imaged sinuses are clear without evidence of mucosal thickening or fluid levels. Mastoids: Unremarkable; the mastoid air cells are clear. No acute ischemic infarct, hemorrhage, or mass effect.  Aging changes as discussed above. **This report has been created using voice recognition software. It may contain minor errors which are inherent in voice recognition technology. ** Final report electronically signed by Dr. Lizette Long on 1/3/2020 10:22 AM    Ct Chest W Contrast    Result Date: 1/3/2020  PROCEDURE: CT CHEST W CONTRAST CLINICAL INFORMATION: fall. COMPARISON: Chest x-ray earlier today TECHNIQUE: 5 mm axial images were obtained through the chest after the administration of intravenous contrast. Sagittal and coronal reconstructions were obtained. All CT scans at this facility use dose modulation, iterative reconstruction, and/or weight-based dosing when appropriate to reduce radiation dose to as low as reasonably achievable. FINDINGS: Lungs: There is a lobulated 2.3 x 3 cm lateral right upper lobe pulmonary mass consistent with primary lung carcinoma versus a metastasis; there is an additional 8 x 7 mm left upper lobe pulmonary nodule (axial image 26). There is also a subtle 4 x 3 mm noncalcified nodule in the lateral right middle lobe (axial image 31). There are a few scattered calcified granulomas. There is bilateral multilobar atelectasis. There is no pneumonia. The trachea and major bronchi are unremarkable. Pleura: No pleural effusion. No pneumothorax. Diaphragm: There is moderate elevation of the right hemidiaphragm. Heart: Heart size is normal. There is no pericardial effusion. Isamar and mediastinum: There is no mass or adenopathy. There are calcified mediastinal and right hilar lymph nodes. There is a small hiatal hernia. Pulmonary vasculature: Unremarkable Thoracic aorta/vascular: No thoracic aortic aneurysm or dissection. The imaged portions of the brachiocephalic arteries are unremarkable. Imaged upper abdomen: See the accompanying abdomen pelvis CT report. Musculoskeletal system: There is multilevel cervical and thoracic degenerative disc disease and endplate spondylosis.  There is a mild to moderate compression fracture of T2 of indeterminate age however there is an acute minimally displaced fracture of the anterior aspect of the vertebral body. This is better seen on the accompanying cervical spine CT. There is associated mild prevertebral edema or hemorrhage at this level. . There is an old mild compression fracture of T7. There is mild thoracic levoscoliosis. Chest/body wall soft tissues: There are postoperative changes of the left breast with multiple surgical clips and an adjacent 1.9 x 1.3 cm scar in the medial left breast. Thyroid: There is a 1.5 x 1.3 cm right thyroid nodule. Consider nonemergent ultrasound for further evaluation. Acute minimally displaced fracture of the anterior T2 vertebral body with an age indeterminate mild to moderate compression fracture component. Associated mild prevertebral edema or hemorrhage. 2.3 x 3 cm right upper lobe pulmonary mass consistent with primary lung carcinoma versus metastasis. 8 x 7 mm left upper lobe pulmonary nodule. No acute traumatic abnormality of the chest. No acute pneumonia. Bilateral multilobar atelectasis. 1.5 x 1.3 cm right thyroid nodule, consider nonemergent ultrasound for further evaluation. Postoperative changes of the left breast. Results discussed with Dr. Tom Cantu on 1/3/2020 at 10:40 AM. **This report has been created using voice recognition software. It may contain minor errors which are inherent in voice recognition technology. ** Final report electronically signed by Dr. Alis Rincon on 1/3/2020 10:43 AM    Ct Cervical Spine Wo Contrast    Result Date: 1/3/2020  PROCEDURE: CT CERVICAL SPINE WO CONTRAST CLINICAL INFORMATION: trauma . COMPARISON: No prior study. TECHNIQUE: 2-D multiplanar noncontrast images of the cervical spine. Bone windows only. All CT scans at this facility use dose modulation, iterative reconstruction, and/or weight-based dosing when appropriate to reduce radiation dose to as low as reasonably achievable.  FINDINGS: There is an acute anterior wedge compression fracture of T2. There is no retropulsion or central canal encroachment. There is a approximately 25% loss of vertebral body height. There is periureteral vertebral hematoma. There is no acute cervical spine fracture. There is disc space narrowing and degenerative change at every level is most severe at C5-6 and C6-7. Hypertrophic facet changes are present throughout and are greater on the left and the right. There is marked right neural foraminal narrowing at C5-6. No precervical soft tissue swelling. Inhomogeneous appearance of the thyroid with enlargement of the right thyroid lobe suggesting goiter. Case discussed with Dr. Deb Watt at the time of exam.     Acute anterior wedge compression fracture of T2 with no central canal encroachment. **This report has been created using voice recognition software. It may contain minor errors which are inherent in voice recognition technology. ** Final report electronically signed by Dr. Adarsh Villa on 1/3/2020 10:23 AM    Ct Abdomen Pelvis W Iv Contrast Additional Contrast? None    Result Date: 1/3/2020  PROCEDURE: CT ABDOMEN PELVIS W IV CONTRAST CLINICAL INFORMATION: fall, R lateral rib pain, urinary incontinence, please recon spine . COMPARISON: None. TECHNIQUE: 5 mm axial CT images were obtained through the abdomen and pelvis after the administration of intravenous contrast. Coronal and sagittal reconstructions were obtained. All CT scans at this facility use dose modulation, iterative reconstruction, and/or weight-based dosing when appropriate to reduce radiation dose to as low as reasonably achievable. FINDINGS: Lower chest: See the accompanying chest CT report. Liver: There are scattered calcified granulomas. There is a 4 mm round hypodense lesion in the anterior right lobe of the liver, too small to characterize but possibly a cyst.. There is no liver mass or intrahepatic biliary dilatation.  Gallbladder/Biliary tree: Unremarkable. No calcified gallstones. No extrahepatic biliary dilatation. Spleen: There are calcified granulomas. No splenomegaly. Pancreas: There is severe pancreatic atrophy with diffuse severe dilatation of the pancreatic duct. No obstructing process is identified. There are 2 subcentimeter nodular foci in the pancreatic duct in the tail (sagittal image 26, coronal image 27). Neoplasm not excluded. There are no findings to suggest acute pancreatitis. Adrenal glands: There is thickening of the limbs of the adrenal glands. No mass. Kidneys and ureters: There is a 1.2 x 0.9 cm ovoid hypodense lesion in the extreme upper pole of the left kidney, likely a cyst. There is bilateral renal scarring. No hydroureteronephrosis. No renal or ureteral calculi. No findings to suggest acute pyelonephritis. Stomach, small bowel, and colon: There is a small hiatal hernia. Stomach and duodenum are otherwise unremarkable. There is colonic diverticulosis without diverticulitis. Small bowel and colon are otherwise unremarkable. There is no evidence of bowel wall thickening. No evidence of bowel obstruction. Appendix: Unremarkable. No findings to suggest acute appendicitis. Omentum and mesentery: Unremarkable Aorta, vascular: No aortic aneurysm or dissection. No significant vascular abnormality. Reproductive: The uterus is absent consistent with hysterectomy. The adnexal regions are unremarkable. There is a pessary in the vagina. Bladder: The wall of the urinary bladder is mildly thickened, most likely due to incomplete distension. No calcified stones. Intraperitoneal/retroperitoneal Space: There is no ascites, abscess, adenopathy, or mass. No pneumoperitoneum. Abdominal and pelvic body wall soft tissues: There is a tiny fat-containing umbilical hernia. Musculoskeletal structures: There are old mild compression fracture deformities of T7 and T8. There is no acute fracture.  There is multilevel thoracolumbar degenerative disc disease with endplate spondylosis. There is multilevel lumbar facet joint DJD. There is grade 1 anterolisthesis of L4 on L5, likely degenerative in nature. No acute traumatic abnormality of the solid or hollow viscera of the abdomen and pelvis. No acute inflammatory or infectious process in the abdomen or pelvis. Colonic diverticulosis without diverticulitis. Severe pancreatic atrophy with marked diffuse pancreatic ductal dilatation. 2 subcentimeter nodular foci in the duct in the pancreatic tail. Neoplasm not excluded. Additional nonemergent findings as detailed above. **This report has been created using voice recognition software. It may contain minor errors which are inherent in voice recognition technology. ** Final report electronically signed by Dr. Brian Zavala on 1/3/2020 10:55 AM    Mri Abdomen W Wo Contrast Mrcp    Result Date: 1/4/2020  PROCEDURE: MRI ABDOMEN W WO CONTRAST MRCP CLINICAL INFORMATION: possible pancreatic mass & duct on CT history of breast cancer COMPARISON: CT 1/3/2020 TECHNIQUE: Routine MRI abdomen without and with IV contrast.  Routine MRCP was also performed. The MRCP examination includes 3D reconstructions of the biliary tree and the pancreatic duct. CONTRAST: 10 cc ProHance FINDINGS: LIVER: The liver is normal size. The hepatic parenchymal signal is normal. 4 mm probable cyst near the anterior branch of the right portal vein. There is no intrahepatic biliary dilatation. There is normal enhancement of the portal and the hepatic veins. There is normal enhancement of the portal venous confluence, SMV, and splenic vein. GALLBLADDER: The gallbladder is filled with multiple small stones. No wall thickening or pericholecystic fluid. BILIARY TREE: The common duct is normal size measuring 4 mm There is no gross choledocholithiasis. PANCREAS: The pancreas is markedly atrophied. The pancreatic duct is moderately prominent measuring up to 8 mm particularly within the tail of the pancreas.  No gross mass is suggested. There is no peripancreatic fluid or inflammation. SPLEEN:  No mass or enlargement. ADRENAL GLANDS: No mass or enlargement. KIDNEYS: No mass or obstruction. BOWEL: Nonobstructive. FREE AIR/FREE FLUID/INFLAMMATION: None. LYMPHADENOPATHY: There are no pathologically enlarged lymph nodes. ABDOMINAL AORTA/IVC: Unremarkable. LUNG BASES: Unremarkable. MUSCULOSKELETAL: Unremarkable. OTHER: None. A gross pancreatic mass is not seen. There is pancreatic atrophy as well as moderate ductal dilatation. Multiple tiny gallstones are noted **This report has been created using voice recognition software. It may contain minor errors which are inherent in voice recognition technology. ** Final report electronically signed by Dr. Gucci Mishra on 1/4/2020 2:00 PM    Ct Lumbar Reconstruction Wo Post Process    Result Date: 1/3/2020  PROCEDURE: CT LUMBAR RECONSTRUCTION WO POST PROCESS CLINICAL INFORMATION: pain. Rib and back pain. COMPARISON: Lumbar spine radiographs dated 9/19/2016. TECHNIQUE: Axial, sagittal and coronal reformatted images of the lumbar spine reconstructed from the patient's abdomen and pelvis CT. IV contrast is present. All CT scans at this facility use dose modulation, iterative reconstruction, and/or weight-based dosing when appropriate to reduce radiation dose to as low as reasonably achievable. FINDINGS: There is grade 1 anterolisthesis of L4 relative to L5 on the basis of degenerative change, stable compared to prior radiographs. There is otherwise anatomic vertebral body height and alignment. No fracture of the lumbar vertebral column is identified. There is mild osteophytosis at the sacral iliac joints bilaterally. There is disc space narrowing and vacuum disc phenomenon at T8-9 through T11-12 with associated anterior osteophytes. At T12-L1 there is no significant spinal canal or neuroforaminal stenosis.  At L1-2 there is a shallow disc bulge without significant spinal canal stenosis and

## 2020-01-07 NOTE — BRIEF OP NOTE
Brief Postoperative Note  ______________________________________________________________    Patient: Modesta Cruz  YOB: 1934  MRN: [id number]  Date of Procedure: 1/7/2020    Pre-Op Diagnosis: Acute pathologic compression fracture of T2. Hx of breast cancer    Post-Op Diagnosis: Same       Procedure(s):  KYPHOPLASTY T2 WITH BIOPSY     Anesthesia: General    Surgeon(s):  Patsy Ashford MD    Assistant: none    Estimated Blood Loss (mL): 0    Complications: None    Specimens:   ID Type Source Tests Collected by Time Destination   A : biopsy of T2 vertebral body Tissue Back SURGICAL PATHOLOGY Patsy Ashford MD 1/7/2020 1426        Implants:  Implant Name Type Inv.  Item Serial No.  Lot No. LRB No. Used   [employer] Xpede Bone Cement     [id number] N/A 1         Drains:   [REMOVED] Urethral Catheter Straight-tip 8 fr (Removed)   Urine Color Tash 1/3/2020  8:31 AM   Urine Appearance Clear 1/3/2020  8:31 AM       Findings: compression fracture    Patsy Ashford MD  Date: 1/7/2020  Time: 2:33 PM

## 2020-01-07 NOTE — PROGRESS NOTES
pleasant and agreeable. Pt currently NPO, to have kyphoplasty with biopsy today at 12:30. General:  Overall Orientation Status: Within Functional Limits  Follows Commands: Within Functional Limits    Vision: Within Functional Limits    Hearing: Within functional limits         Pain:  Denies. Social/Functional History:    Lives With: Alone  Type of Home: House  Home Layout: One level  Home Access: Stairs to enter with rails  Entrance Stairs - Number of Steps: 2  Entrance Stairs - Rails: Both  Home Equipment: [employer], 4 wheeled walker      Ambulation Assistance: Independent  Transfer Assistance: Independent    Active : Yes     Additional Comments: Pt states amb with SC or rollator; has aide that go with her for groceries 1x/week, daughter-in-law assists with housework, pt I with dressing, showers, laundry; has Life Alert    OBJECTIVE:  Range of Motion:  Bilateral Lower Extremity: WFL    Strength:  Bilateral Lower Extremity: Impaired - grossly 4/5    Balance:  Static Sitting Balance:  Stand By Assistance  Static Standing Balance: Contact Guard Assistance  Dynamic Standing Balance: Contact Guard Assistance  **Pt sits EOB at least 10 minutes to improve upright tolerance in prep for transfer/gait training    Bed Mobility:  Supine to Sit: Stand By Assistance  Sit to Supine: Stand By Assistance     Transfers:  Sit to Stand: Contact Guard Assistance  Stand to [employer] Assistance    Ambulation:  Contact Guard Assistance  Distance: 100 feet  Surface: Level Tile  Device:Cane  Gait Deviations: Forward Flexed Posture, Slow Jada, Decreased Step Length Bilaterally and Decreased Gait Speed  **Slow, guarded pace    Exercise:  Patient was guided in 1 set(s) 10 reps of exercise to both lower extremities. Ankle pumps, Quad sets and Heelslides. Exercises were completed for increased independence with functional mobility.     Functional Outcome Measures: Completed  AM-PAC Inpatient Mobility without Stair Climbing Raw Score : 15  AM-PAC Inpatient without Stair Climbing T-Scale Score : 43.03    ASSESSMENT:  Activity Tolerance:  Patient tolerance of  treatment: good. Treatment Initiated: Treatment and education initiated within context of evaluation. Evaluation time included review of current medical information, gathering information related to past medical, social and functional history, completion of standardized testing, formal and informal observation of tasks, assessment of data and development of plan of care and goals. Treatment time included skilled education and facilitation of tasks to increase safety and independence with functional mobility for improved independence and quality of life. See above exercises, EOB balance/tolerance, education on recommendation for [facility] PT. Assessment: Body structures, Functions, Activity limitations: Decreased functional mobility , Decreased strength, Decreased balance  Assessment: Pt tolerates session well, limited by generalized weakness, deconditioning since being in the hospital. Pt education to amb at least 3x/day with staff in the hallway. PT to continue to progress strength and functional mobility.   Prognosis: Good    REQUIRES PT FOLLOW UP: Yes    Discharge Recommendations:  Discharge Recommendations: Home with Home health PT(Pt could benefit from [facility] PT, however, pt declining, stating she has family in everyday, Life Alert)    Patient Education:  PT Education: PT Role, Goals, Plan of Care    Equipment Recommendations:  Equipment Needed: No  Other: has cane and rollator    Plan:  Times per week: 3-5 X GM  Current Treatment Recommendations: Strengthening, Transfer Training, Patient/Caregiver Education & Training, Balance Training, Gait Training, Functional Mobility Training, Stair training, Equipment Evaluation, Education, & procurement    Goals:  Patient goals : to go home  Short term goals  Time Frame for Short term goals: by discharge  Short term goal 1: Pt to transfer supine <--> sit S to enable pt to get in/out of bed. Short term goal 2: Pt to transfer sit <--> stand S for increased functional mobility. Short term goal 3: Pt to ambulate >200 feet with SC SBA for household ambulation. Short term goal 4: Pt to ascend/descend 2 steps with HR SBA for home entry. Long term goals  Time Frame for Long term goals : NA due to short length of stay. Following session, patient left in safe position with all fall risk precautions in place.

## 2020-01-07 NOTE — FLOWSHEET NOTE
ACP: Patient's DIL contacted the daughter who is POA. She will fax the AD directly to Medical Records. SC: Patient enjoying visit with her DIL. Patient has 5 children and 15 grandchildren and 2 Great-grands. She states she has excellent support from the local members of her family and the out-of-towners stay in touch by phone. She is looking forward to going home.         01/06/20 1914   Encounter Summary   Services provided to: Patient and family together   Referral/Consult From: [address] [place] [address] Children;Family members   Continue Visiting Yes  (1/6)   Complexity of Encounter Moderate   Length of Encounter 30 minutes   Advance Care Planning Yes   Spiritual/Cheondoism   Type Spiritual support   Assessment Approachable   Intervention Active listening   Outcome Expressed gratitude;Engaged in conversation   Advance Directives (For Healthcare)   Healthcare Directive Yes, patient has an advance directive for healthcare treatment   Type of Healthcare Directive Durable power of  for health care   Copy in Chart No, copy requested from family   Healthcare Agent Appointed Adult Children

## 2020-01-07 NOTE — PROGRESS NOTES
NEw Consult  [id number] - Met with Pt and family (son, Alton Dakins, [place], Claudia Alfonso and daughter, Efrain Gaytan). Pt is A & O, denies pain or other complaints. Pt and family are agreeable to conversation. Palliative Care and concepts are review, discussed in detail and acknowledged as understood. Pt. With recent fall, in for pain and is having Kyphoplasty procedure today around 1230. Pt also had scans which revealed suspicious areas for Cancer - Biopsy scheduled for tomorrow. Due to these events pt. And family will need more information in order to determine goals of care. In depth conversation ensues regarding code status. Pt. Is currently listed as a Full code. Pt. Maren Kevin on what her wishes are based on how severe the outcome of tests are. All code status's are reviewed in detail, all questions are answered. This subject is left for the time being. In depth discussion on pt.'s needs after discharge. Family is adamant pt. Cannot go home alone. SS consult is made for discharge plans. Pt. And family believe pt. Has advance directives and they should be on file - I have not been able to locate any on chart, but look again - none on chart. Medical records is contacted - they have no record of advance directives on chart or having been faxed today. Efrain Gaytan begins making phone calls, advance directives are found with a family member - they will fax a copy here. Code status remains full at this time. Continue to support family in search for advance directives and finally received [phone]  Palliative care to followup tomorrow.

## 2020-01-07 NOTE — ANESTHESIA PRE PROCEDURE
as a child       Social History:    Social History     Tobacco Use    Smoking status: Never Smoker    Smokeless tobacco: Never Used   Substance Use Topics    Alcohol use: No                                Counseling given: Not Answered      Vital Signs (Current):   Vitals:    01/07/20 0107 01/07/20 0459 01/07/20 0845 01/07/20 1219   BP:  (!) 143/73 134/68 (!) 160/68   Pulse:  74 73 91   Resp:  18 20 18   Temp:  98.1 °F (36.7 °C) 97.9 °F (36.6 °C) 97.4 °F (36.3 °C)   TempSrc:  Oral Oral Oral   SpO2: 97% 96% 92% 91%   Weight:       Height:                                                  BP Readings from Last 3 Encounters:   01/07/20 (!) 160/68   10/10/19 (!) 166/76   07/09/19 (!) 174/72       NPO Status:                                                                                 BMI:   Wt Readings from Last 3 Encounters:   01/06/20 172 lb 12.8 oz (78.4 kg)   10/10/19 176 lb 9.4 oz (80.1 kg)   07/09/19 179 lb 12.8 oz (81.6 kg)     Body mass index is 30.61 kg/m².     CBC:   Lab Results   Component Value Date    WBC 7.7 01/06/2020    RBC 4.30 01/06/2020    RBC 4.91 04/10/2018    HGB 12.8 01/06/2020    HCT 39.1 01/06/2020    MCV 90.9 01/06/2020    RDW 13.0 04/10/2018     01/06/2020       CMP:   Lab Results   Component Value Date     01/06/2020    K 4.3 01/06/2020    K 4.1 01/03/2020     01/06/2020    CO2 22 01/06/2020    BUN 25 01/06/2020    CREATININE 0.9 01/06/2020    LABGLOM 59 01/06/2020    GLUCOSE 180 01/06/2020    GLUCOSE 186 04/10/2018    PROT 7.4 01/03/2020    CALCIUM 9.1 01/06/2020    BILITOT 0.3 01/03/2020    ALKPHOS 171 01/03/2020    AST 16 01/03/2020    ALT 19 01/03/2020       POC Tests:   Recent Labs     01/07/20  1221   POCGLU 69*       Coags: No results found for: PROTIME, INR, APTT    HCG (If Applicable): No results found for: PREGTESTUR, PREGSERUM, HCG, HCGQUANT     ABGs: No results found for: PHART, PO2ART, MLS3UDN, OHU4ITS, BEART, Y0XGEIOZ     Type & Screen (If Applicable):  No results found for: LABABO, [address]    Anesthesia Evaluation  Patient summary reviewed  Airway: Mallampati: III  TM distance: >3 FB   Neck ROM: limited  Mouth opening: > = 3 FB Dental:          Pulmonary:                              Cardiovascular:    (+) hypertension:,       ECG reviewed                        Neuro/Psych:   (+) psychiatric history:            GI/Hepatic/Renal:             Endo/Other:    (+) Diabetes, hypothyroidism::., .                 Abdominal:           Vascular:                                        Anesthesia Plan      general     ASA 3       Induction: intravenous. MIPS: Postoperative opioids intended and Prophylactic antiemetics administered. Anesthetic plan and risks discussed with patient and child/children. Plan discussed with CRNA. Manuela Milner.  [facility],    1/7/2020

## 2020-01-08 ENCOUNTER — APPOINTMENT (OUTPATIENT)
Dept: GENERAL RADIOLOGY | Age: 85
DRG: 478 | End: 2020-01-08
Payer: MEDICARE

## 2020-01-08 ENCOUNTER — APPOINTMENT (OUTPATIENT)
Dept: CT IMAGING | Age: 85
DRG: 478 | End: 2020-01-08
Payer: MEDICARE

## 2020-01-08 LAB
EKG ATRIAL RATE: 75 BPM
EKG P AXIS: 77 DEGREES
EKG P-R INTERVAL: 172 MS
EKG Q-T INTERVAL: 396 MS
EKG QRS DURATION: 130 MS
EKG QTC CALCULATION (BAZETT): 442 MS
EKG R AXIS: 20 DEGREES
EKG T AXIS: 72 DEGREES
EKG VENTRICULAR RATE: 75 BPM
GLUCOSE BLD-MCNC: 120 MG/DL (ref 70–108)
GLUCOSE BLD-MCNC: 140 MG/DL (ref 70–108)
GLUCOSE BLD-MCNC: 165 MG/DL (ref 70–108)
GLUCOSE BLD-MCNC: 220 MG/DL (ref 70–108)

## 2020-01-08 PROCEDURE — 32405 CT NEEDLE BIOPSY LUNG PERCUTANEOUS: CPT

## 2020-01-08 PROCEDURE — 6370000000 HC RX 637 (ALT 250 FOR IP): Performed by: PAIN MEDICINE

## 2020-01-08 PROCEDURE — 2580000003 HC RX 258: Performed by: RADIOLOGY

## 2020-01-08 PROCEDURE — 2580000003 HC RX 258: Performed by: FAMILY MEDICINE

## 2020-01-08 PROCEDURE — 2709999900 HC NON-CHARGEABLE SUPPLY

## 2020-01-08 PROCEDURE — 6360000002 HC RX W HCPCS: Performed by: RADIOLOGY

## 2020-01-08 PROCEDURE — 82948 REAGENT STRIP/BLOOD GLUCOSE: CPT

## 2020-01-08 PROCEDURE — 6370000000 HC RX 637 (ALT 250 FOR IP): Performed by: FAMILY MEDICINE

## 2020-01-08 PROCEDURE — 77012 CT SCAN FOR NEEDLE BIOPSY: CPT

## 2020-01-08 PROCEDURE — 99232 SBSQ HOSP IP/OBS MODERATE 35: CPT | Performed by: NURSE PRACTITIONER

## 2020-01-08 PROCEDURE — 88342 IMHCHEM/IMCYTCHM 1ST ANTB: CPT

## 2020-01-08 PROCEDURE — 71045 X-RAY EXAM CHEST 1 VIEW: CPT

## 2020-01-08 PROCEDURE — 0BBC3ZX EXCISION OF RIGHT UPPER LUNG LOBE, PERCUTANEOUS APPROACH, DIAGNOSTIC: ICD-10-PCS | Performed by: RADIOLOGY

## 2020-01-08 PROCEDURE — 88305 TISSUE EXAM BY PATHOLOGIST: CPT

## 2020-01-08 PROCEDURE — 6370000000 HC RX 637 (ALT 250 FOR IP)

## 2020-01-08 PROCEDURE — 6360000002 HC RX W HCPCS

## 2020-01-08 PROCEDURE — 88333 PATH CONSLTJ SURG CYTO XM 1: CPT

## 2020-01-08 PROCEDURE — 88341 IMHCHEM/IMCYTCHM EA ADD ANTB: CPT

## 2020-01-08 PROCEDURE — 88334 PATH CONSLTJ SURG CYTO XM EA: CPT

## 2020-01-08 PROCEDURE — 1200000000 HC SEMI PRIVATE

## 2020-01-08 PROCEDURE — 97166 OT EVAL MOD COMPLEX 45 MIN: CPT

## 2020-01-08 PROCEDURE — 93010 ELECTROCARDIOGRAM REPORT: CPT | Performed by: INTERNAL MEDICINE

## 2020-01-08 PROCEDURE — 97110 THERAPEUTIC EXERCISES: CPT

## 2020-01-08 PROCEDURE — 99232 SBSQ HOSP IP/OBS MODERATE 35: CPT | Performed by: INTERNAL MEDICINE

## 2020-01-08 RX ORDER — FENTANYL CITRATE 50 UG/ML
50 INJECTION, SOLUTION INTRAMUSCULAR; INTRAVENOUS ONCE
Status: COMPLETED | OUTPATIENT
Start: 2020-01-08 | End: 2020-01-08

## 2020-01-08 RX ORDER — MIDAZOLAM HYDROCHLORIDE 1 MG/ML
1 INJECTION INTRAMUSCULAR; INTRAVENOUS PRN
Status: DISCONTINUED | OUTPATIENT
Start: 2020-01-08 | End: 2020-01-10 | Stop reason: HOSPADM

## 2020-01-08 RX ORDER — SODIUM CHLORIDE 450 MG/100ML
INJECTION, SOLUTION INTRAVENOUS CONTINUOUS
Status: DISCONTINUED | OUTPATIENT
Start: 2020-01-08 | End: 2020-01-08

## 2020-01-08 RX ORDER — IBUPROFEN 200 MG
CAPSULE ORAL ONCE
Status: COMPLETED | OUTPATIENT
Start: 2020-01-08 | End: 2020-01-08

## 2020-01-08 RX ADMIN — ACETAMINOPHEN 650 MG: 325 TABLET ORAL at 20:31

## 2020-01-08 RX ADMIN — DOCUSATE SODIUM 100 MG: 100 CAPSULE, LIQUID FILLED ORAL at 06:07

## 2020-01-08 RX ADMIN — Medication: at 09:36

## 2020-01-08 RX ADMIN — CALCIUM 500 MG: 500 TABLET ORAL at 12:11

## 2020-01-08 RX ADMIN — INSULIN LISPRO 2 UNITS: 100 INJECTION, SOLUTION INTRAVENOUS; SUBCUTANEOUS at 20:32

## 2020-01-08 RX ADMIN — Medication 10 ML: at 20:31

## 2020-01-08 RX ADMIN — FENTANYL CITRATE 25 MCG: 50 INJECTION, SOLUTION INTRAMUSCULAR; INTRAVENOUS at 09:15

## 2020-01-08 RX ADMIN — DOXYCYCLINE HYCLATE 50 MG: 100 TABLET, COATED ORAL at 12:11

## 2020-01-08 RX ADMIN — LEVOTHYROXINE SODIUM 88 MCG: 88 TABLET ORAL at 05:37

## 2020-01-08 RX ADMIN — AMITRIPTYLINE HYDROCHLORIDE 25 MG: 25 TABLET, FILM COATED ORAL at 20:31

## 2020-01-08 RX ADMIN — INSULIN LISPRO 2 UNITS: 100 INJECTION, SOLUTION INTRAVENOUS; SUBCUTANEOUS at 18:39

## 2020-01-08 RX ADMIN — METOPROLOL SUCCINATE 25 MG: 25 TABLET, FILM COATED, EXTENDED RELEASE ORAL at 12:11

## 2020-01-08 RX ADMIN — METFORMIN HYDROCHLORIDE 500 MG: 500 TABLET ORAL at 18:24

## 2020-01-08 RX ADMIN — VENLAFAXINE 75 MG: 37.5 TABLET ORAL at 12:11

## 2020-01-08 RX ADMIN — PRAVASTATIN SODIUM 20 MG: 20 TABLET ORAL at 20:31

## 2020-01-08 RX ADMIN — SODIUM CHLORIDE: 4.5 INJECTION, SOLUTION INTRAVENOUS at 08:18

## 2020-01-08 RX ADMIN — INSULIN LISPRO 5 UNITS: 100 INJECTION, SOLUTION INTRAVENOUS; SUBCUTANEOUS at 18:38

## 2020-01-08 RX ADMIN — DOCUSATE SODIUM 100 MG: 100 CAPSULE, LIQUID FILLED ORAL at 20:31

## 2020-01-08 RX ADMIN — MIDAZOLAM 1 MG: 1 INJECTION INTRAMUSCULAR; INTRAVENOUS at 09:24

## 2020-01-08 RX ADMIN — METFORMIN HYDROCHLORIDE 500 MG: 500 TABLET ORAL at 12:11

## 2020-01-08 RX ADMIN — RAMIPRIL 2.5 MG: 2.5 CAPSULE ORAL at 12:11

## 2020-01-08 ASSESSMENT — PAIN SCALES - GENERAL
PAINLEVEL_OUTOF10: 0
PAINLEVEL_OUTOF10: 0

## 2020-01-08 ASSESSMENT — PAIN DESCRIPTION - PROGRESSION
CLINICAL_PROGRESSION: NOT CHANGED

## 2020-01-08 NOTE — PROGRESS NOTES
Pain Management    Progress Note      1/8/2020 3:21 PM     · SUBJECTIVE:    · Chief Complaint: Intractable back pain, Compression fracture   · POD # 1 from Kyphoplasty @ T2. · Patient seen in her room up sitting in the chair with her daughter present. Has been moving better since Kyphoplasty. · Denies any upper back pain at this time  · Had lung biopsy today, has small pneumothorax and planning to  stay one more night. · Appeared comfortable in the chair. · Kyphoplasty dressing broken down.    · Denies any upper back pain   · Constipation: + BM 1/7/2020    Current medications:    insulin lispro  0-12 Units Subcutaneous TID WC    insulin lispro  0-6 Units Subcutaneous Nightly    insulin lispro  5 Units Subcutaneous TID WC    lidocaine  1 patch Transdermal Daily    metFORMIN  500 mg Oral BID WC    levothyroxine  88 mcg Oral Daily    amitriptyline  25 mg Oral Nightly    pravastatin  20 mg Oral Nightly    doxycycline hyclate  50 mg Oral Daily    calcium elemental  500 mg Oral Daily    docusate sodium  100 mg Oral BID    venlafaxine  75 mg Oral Daily    ramipril  2.5 mg Oral Daily    metoprolol succinate  25 mg Oral Daily    aspirin  81 mg Oral Daily    acetaminophen  650 mg Oral Nightly    sodium chloride flush  10 mL Intravenous 2 times per day   ·   ·                                    midazolam, acetaminophen, traMADol, sodium chloride flush, magnesium hydroxide, ondansetron, glucose, glucagon (rDNA)  ·                                    @MEDSINFUSIONS        REVIEW OF SYSTEMS:  CONSTITUTIONAL:  Debility and pain   EYES:  negative  HEENT:  negative  RESPIRATORY:  negative  CARDIOVASCULAR:  negative  GASTROINTESTINAL:  + BM 1/7/2019   GENITOURINARY:  Incontinence   SKIN:  negative  HEMATOLOGIC/LYMPHATIC:  Negative   MUSCULOSKELETAL:  positive for  myalgias, arthralgias, pain and muscle weakness  NEUROLOGICAL:  negative  BEHAVIOR/PSYCH:  negative  System review otherwise negative      PHYSICAL EXAM:  BP (!) 151/72   Pulse 74   Temp 97.9 °F (36.6 °C) (Oral)   Resp 18   Ht 5' 3\" (1.6 m)   Wt 172 lb 12.8 oz (78.4 kg)   SpO2 95%   BMI 30.61 kg/m²  I Body mass index is 30.61 kg/m². I   Wt Readings from Last 1 Encounters:   01/06/20 172 lb 12.8 oz (78.4 kg)      awake  Orientation:   person, place, time  Mood: within normal limits  Affect: calm  General appearance: Patient is well nourished, well developed, well groomed and in no acute distress, appearing stated age     Memory:   decreased thought processing   Attention/Concentration: normal  Language:  normal     Cranial Nerves:  cranial nerves II-XII are grossly intact  ROM:  Normal ROM in all 4 extremities, guarded with movement d/t pain in upper back   Motor Exam:  Motor exam is 5 out of 5 all extremities with the exception of 4/5 strength bilateral lower extremities   Tenderness right lower back, no upper back tenderness  Tone:  normal  Muscle bulk: within normal limits  Sensory:  Sensory intact        Heart: normal rate, regular rhythm, normal S1, S2, no murmurs, rubs, clicks or gallops  Lungs: Diminished, clear to auscultation without wheezes or rales. On room air   Abdomen: soft, non-tender, non-distended, normal bowel sounds, no masses or organomegaly     Skin: warm and dry, no rash or erythema. Upper back Kyphoplasty dressing removed, sutures intact. Right chest biopsy site covered with bandaid   Peripheral vascular: Pulses: Normal upper and lower extremity pulses; Edema: trace    DATA    Recent Labs     01/06/20  0453   WBC 7.7   RBC 4.30   HGB 12.8   HCT 39.1   MCV 90.9   MCH 29.8   MCHC 32.7      MPV 9.6     Recent Labs     01/06/20  0453      K 4.3      CO2 22*   BUN 25*   CREATININE 0.9   GLUCOSE 180*   CALCIUM 9.1     Recent Labs     01/07/20  1946 01/08/20  1103 01/08/20  1227   POCGLU 177* 120* 140*       ASSESSMENT   1. Acute pathologic compression fracture of T2 with s/p T2 Kyphoplasty from 1/7/2020  2.  Intractable back pain   3. Right lung mass   4. History of breast cancer   5. Chronic T7 and T8 compression fracture       PLAN  1. Continue pain management   2. Upper back pain improved since Kyphoplasty- POD # 1   3. Will plan to follow up outpatient pain clinic on 1/20/2020 for suture removal.   4. Okay to discharge from a pain management perspective. Please call with any needs. Spent 28 minutes evaluating and examining patient and completing documentation      JAYSON Najera - CNP, 1/8/2020, 3:21 PM     I have evaluated the patient and reviewed the case with the nurse practitioner. I agree with the current plan of care including the workup, evaluation, management, and diagnosis. Care plan has been discussed. I agree with above documentation. Patient s/p Kyphoplasty , which helped pain a lot.   Electronically signed by Epifanio Gold MD on 1/9/2020 at 10:02 AM

## 2020-01-08 NOTE — PROGRESS NOTES
intact  Psychiatric: Alert and oriented  Vascular: Dorsalis pedis palpable bilaterally. Radial pulses palpable bilaterally. Skin:  No visible rashes or lesions. Labs       Recent Labs     01/06/20  0453      K 4.3      CO2 22*   BUN 25*   CREATININE 0.9   CALCIUM 9.1     No results for input(s): AST, ALT, BILIDIR, BILITOT, ALKPHOS in the last 72 hours. No results for input(s): INR in the last 72 hours. No results for input(s): Murleen Iba in the last 72 hours. Urinalysis:      Lab Results   Component Value Date    NITRU NEGATIVE 01/03/2020    WBCUA 0-2 01/03/2020    BACTERIA NONE SEEN 01/03/2020    RBCUA 0-2 01/03/2020    BLOODU NEGATIVE 01/03/2020    SPECGRAV >1.030 01/03/2020       Diagnostic imaging/procedures     Xr Chest Standard (2 Vw)    Result Date: 1/3/2020  PROCEDURE: XR CHEST (2 VW) CLINICAL INFORMATION: trauma, fall R lateral rib pain, urinary incontinence. COMPARISON: No prior study. TECHNIQUE: PA and lateral views of the chest. FINDINGS: Lungs/pleura: There is a 2.4 x 2.3 cm right upper lobe mass. There is no pneumonia or pulmonary edema. There is moderate to marked elevation of the right hemidiaphragm with mild right basilar atelectasis. There is minimal linear atelectasis or scarring in the mid left lung. There is a 3 to 4 mm right basilar pulmonary nodule which may represent a calcified granuloma. No pleural effusion. No pneumothorax. Heart: Heart size is normal. Mediastinum/rachid: No obvious mass or adenopathy. Skeleton: Bones are osteopenic. There are mild degenerative changes of the thoracic spine. There is no obvious fracture or dislocation. Soft tissues: Left breast soft tissues are smaller than those on the right suggestive of prior mastectomy. There are left lateral chest wall surgical clips. 2.4 x 2.3 cm right upper lobe mass. Recommend chest CT for further evaluation. 3 to 4 mm right basilar pulmonary nodule, possibly a calcified granuloma.  Elevated right mild prevertebral edema or hemorrhage at this level. . There is an old mild compression fracture of T7. There is mild thoracic levoscoliosis. Chest/body wall soft tissues: There are postoperative changes of the left breast with multiple surgical clips and an adjacent 1.9 x 1.3 cm scar in the medial left breast. Thyroid: There is a 1.5 x 1.3 cm right thyroid nodule. Consider nonemergent ultrasound for further evaluation. Acute minimally displaced fracture of the anterior T2 vertebral body with an age indeterminate mild to moderate compression fracture component. Associated mild prevertebral edema or hemorrhage. 2.3 x 3 cm right upper lobe pulmonary mass consistent with primary lung carcinoma versus metastasis. 8 x 7 mm left upper lobe pulmonary nodule. No acute traumatic abnormality of the chest. No acute pneumonia. Bilateral multilobar atelectasis. 1.5 x 1.3 cm right thyroid nodule, consider nonemergent ultrasound for further evaluation. Postoperative changes of the left breast. Results discussed with Dr. Reed Stallings on 1/3/2020 at 10:40 AM. **This report has been created using voice recognition software. It may contain minor errors which are inherent in voice recognition technology. ** Final report electronically signed by Dr. Galen Frey on 1/3/2020 10:43 AM    Ct Cervical Spine Wo Contrast    Result Date: 1/3/2020  PROCEDURE: CT CERVICAL SPINE WO CONTRAST CLINICAL INFORMATION: trauma . COMPARISON: No prior study. TECHNIQUE: 2-D multiplanar noncontrast images of the cervical spine. Bone windows only. All CT scans at this facility use dose modulation, iterative reconstruction, and/or weight-based dosing when appropriate to reduce radiation dose to as low as reasonably achievable. FINDINGS: There is an acute anterior wedge compression fracture of T2. There is no retropulsion or central canal encroachment. There is a approximately 25% loss of vertebral body height. There is periureteral vertebral hematoma.  There is no acute cervical spine fracture. There is disc space narrowing and degenerative change at every level is most severe at C5-6 and C6-7. Hypertrophic facet changes are present throughout and are greater on the left and the right. There is marked right neural foraminal narrowing at C5-6. No precervical soft tissue swelling. Inhomogeneous appearance of the thyroid with enlargement of the right thyroid lobe suggesting goiter. Case discussed with Dr. Twila Munoz at the time of exam.     Acute anterior wedge compression fracture of T2 with no central canal encroachment. **This report has been created using voice recognition software. It may contain minor errors which are inherent in voice recognition technology. ** Final report electronically signed by Dr. Na Clements on 1/3/2020 10:23 AM    Ct Guided Needle Placement    Result Date: 1/8/2020  PROCEDURE: CT NEEDLE BIOPSY LUNG PERCUTANEOUS, CT GUIDED NEEDLE PLACEMENT CLINICAL INFORMATION: Breast cancer has been in surveillance. Now with lung mass seen on CT chest . COMPARISON: No prior study. TECHNIQUE: Written informed consent was obtained with the procedure explained to the patient and the patient's daughters. The patient was assessed and transferred to CT. The patient was monitored EKG and pulse oximetry monitoring devices by the radiology RN. Procedure duration: 30 minutes MEDICATIONS: Versed 1 mg IV Fentanyl 25 mcg IV. Lidocaine 2% 9 mL. Patient was scanned in the supine position with radiopaque markers in place. Level for access was chosen using an anterior approach. The anterior chest was prepped and draped in sterile fashion. A 19-gauge Lam needle was advanced to the right upper lung mass. After confirmation of position  a 20-gauge sample was obtained. Initial sample was not satisfactory. Second sample was obtained which demonstrated abnormal cells. 3 more samples were then obtained and the needle removed. The patient tolerated this procedure well.  All CT scans at this facility use dose modulation, iterative reconstruction, and/or weight-based dosing when appropriate to reduce radiation dose to as low as reasonably achievable. FINDINGS: Moderate. The tumoral hemorrhage. No pneumothorax. Adequate samples according to pathology. Uncomplicated CT-guided biopsy right upper lung mass. No immediate complications. **This report has been created using voice recognition software. It may contain minor errors which are inherent in voice recognition technology. ** Final report electronically signed by Dr. Arianna Huff on 1/8/2020 10:12 AM    Mri Thoracic Spine Wo Contrast    Result Date: 1/6/2020  PROCEDURE: MRI THORACIC SPINE WO CONTRAST CLINICAL INFORMATION: check acuity of compression fracture, plan for Kyphoplasty. [facility] a few weeks ago. COMPARISON: Thoracic spine CT 1/3/2020. TECHNIQUE: Sagittal T1, T2 and STIR sequences were obtained through the thoracic spine. Axial T2-weighted images were obtained through the discs. FINDINGS: There is a kyphosis of the thoracic spine. There is a compression fracture of T2. There is diffusely abnormal low signal on the T1-weighted images. There is more heterogeneous signal on the T2-weighted images. There is edema on the STIR sequence. This is consistent with an acute compression fracture. The height loss is 30%. There is no associated soft tissue mass. There is no abnormality extending into the spinal canal. This corresponds with the findings on CT. There are no other acute compression fractures. There is chronic appearing mild anterior wedging of T7. There is no edema on the STIR sequence. There is no suspicious marrow signal abnormality. There is some mild degenerative marrow edema anteriorly in the endplates at the J1-1, R9-05 and T11-T12 levels. The thoracic spinal cord is of normal caliber and signal intensity.   There are no abnormalities within the spinal canal. On the axial images, there is no spinal canal stenosis in the thoracic spine. There is moderate severity spinal canal stenosis in the cervical spine at the C6-7 level due to degenerative changes. There is a right-sided lung mass. Please see the patient's dedicated chest CT. 1. Acute compression fracture of T2. There is 30% height loss. There is abnormal marrow signal in the vertebral body. This is indeterminate. There is no associated soft tissue mass or epidural component. This may be related to underlying osteoporosis. Metastatic disease cannot be excluded. 2. No other sites of abnormal marrow signal are noted. 3. Degenerative changes throughout the thoracic spine without spinal canal stenosis. 4. Right lung mass. **This report has been created using voice recognition software. It may contain minor errors which are inherent in voice recognition technology. ** Final report electronically signed by Dr. Lyndel Kocher on 1/6/2020 2:59 PM    Ct Abdomen Pelvis W Iv Contrast Additional Contrast? None    Result Date: 1/3/2020  PROCEDURE: CT ABDOMEN PELVIS W IV CONTRAST CLINICAL INFORMATION: fall, R lateral rib pain, urinary incontinence, please recon spine . COMPARISON: None. TECHNIQUE: 5 mm axial CT images were obtained through the abdomen and pelvis after the administration of intravenous contrast. Coronal and sagittal reconstructions were obtained. All CT scans at this facility use dose modulation, iterative reconstruction, and/or weight-based dosing when appropriate to reduce radiation dose to as low as reasonably achievable. FINDINGS: Lower chest: See the accompanying chest CT report. Liver: There are scattered calcified granulomas. There is a 4 mm round hypodense lesion in the anterior right lobe of the liver, too small to characterize but possibly a cyst.. There is no liver mass or intrahepatic biliary dilatation. Gallbladder/Biliary tree: Unremarkable. No calcified gallstones. No extrahepatic biliary dilatation. Spleen: There are calcified granulomas. No splenomegaly.  Pancreas: There is severe pancreatic atrophy with diffuse severe dilatation of the pancreatic duct. No obstructing process is identified. There are 2 subcentimeter nodular foci in the pancreatic duct in the tail (sagittal image 26, coronal image 27). Neoplasm not excluded. There are no findings to suggest acute pancreatitis. Adrenal glands: There is thickening of the limbs of the adrenal glands. No mass. Kidneys and ureters: There is a 1.2 x 0.9 cm ovoid hypodense lesion in the extreme upper pole of the left kidney, likely a cyst. There is bilateral renal scarring. No hydroureteronephrosis. No renal or ureteral calculi. No findings to suggest acute pyelonephritis. Stomach, small bowel, and colon: There is a small hiatal hernia. Stomach and duodenum are otherwise unremarkable. There is colonic diverticulosis without diverticulitis. Small bowel and colon are otherwise unremarkable. There is no evidence of bowel wall thickening. No evidence of bowel obstruction. Appendix: Unremarkable. No findings to suggest acute appendicitis. Omentum and mesentery: Unremarkable Aorta, vascular: No aortic aneurysm or dissection. No significant vascular abnormality. Reproductive: The uterus is absent consistent with hysterectomy. The adnexal regions are unremarkable. There is a pessary in the vagina. Bladder: The wall of the urinary bladder is mildly thickened, most likely due to incomplete distension. No calcified stones. Intraperitoneal/retroperitoneal Space: There is no ascites, abscess, adenopathy, or mass. No pneumoperitoneum. Abdominal and pelvic body wall soft tissues: There is a tiny fat-containing umbilical hernia. Musculoskeletal structures: There are old mild compression fracture deformities of T7 and T8. There is no acute fracture. There is multilevel thoracolumbar degenerative disc disease with endplate spondylosis. There is multilevel lumbar facet joint DJD.  There is grade 1 anterolisthesis of L4 on L5, likely degenerative in radiation dose to as low as reasonably achievable. FINDINGS: Moderate. The tumoral hemorrhage. No pneumothorax. Adequate samples according to pathology. Uncomplicated CT-guided biopsy right upper lung mass. No immediate complications. **This report has been created using voice recognition software. It may contain minor errors which are inherent in voice recognition technology. ** Final report electronically signed by Dr. Helen Jameson on 1/8/2020 10:12 AM    Fluoro For Surgical Procedures    Result Date: 1/7/2020  Radiology exam is complete. No Radiologist dictation. Please follow up with ordering provider. Ct Lumbar Reconstruction Wo Post Process    Result Date: 1/3/2020  PROCEDURE: CT LUMBAR RECONSTRUCTION WO POST PROCESS CLINICAL INFORMATION: pain. Rib and back pain. COMPARISON: Lumbar spine radiographs dated 9/19/2016. TECHNIQUE: Axial, sagittal and coronal reformatted images of the lumbar spine reconstructed from the patient's abdomen and pelvis CT. IV contrast is present. All CT scans at this facility use dose modulation, iterative reconstruction, and/or weight-based dosing when appropriate to reduce radiation dose to as low as reasonably achievable. FINDINGS: There is grade 1 anterolisthesis of L4 relative to L5 on the basis of degenerative change, stable compared to prior radiographs. There is otherwise anatomic vertebral body height and alignment. No fracture of the lumbar vertebral column is identified. There is mild osteophytosis at the sacral iliac joints bilaterally. There is disc space narrowing and vacuum disc phenomenon at T8-9 through T11-12 with associated anterior osteophytes. At T12-L1 there is no significant spinal canal or neuroforaminal stenosis. At L1-2 there is a shallow disc bulge without significant spinal canal stenosis and moderate bilateral neural foraminal stenosis in association with mild facet hypertrophy.  At L2-3 there is a shallow disc bulge with facet hypertrophy and

## 2020-01-08 NOTE — ANESTHESIA PRE-OP
[facility]  Sedation/Analgesia History & Physical    Pt Name: Micki Rush  MRN: [id number]  YOB: 1934  Provider Performing Procedure: Gee Villareal MD  Primary Care Physician: Wendy Brower MD    PRE-PROCEDURE   DNR-CCA/DNR-CC []Yes [x]No  Brief History/Pre-Procedure Diagnosis: right lung mass , hx of breast cancer          MEDICAL HISTORY  []CAD/Valve  []Liver Disease  []Lung Disease []Diabetes  []Hypertension []Renal Disease  []Additional information:       has a past medical history of Arthritis, Cancer ([facility] [address] Depression, Diabetes ([facility] [address] Hyperlipidemia, Hypertension, Hypothyroidism, PONV (postoperative nausea and vomiting), and Spinal stenosis. SURGICAL HISTORY   has a past surgical history that includes Hysterectomy (1980); Tonsillectomy; eye surgery (2006); Breast surgery (Left); and other surgical history (Left, 11/29/2016).   Additional information:       ALLERGIES   Allergies as of 01/03/2020    (No Known Allergies)     Additional information:       MEDICATIONS   Coumadin Use Last 5 Days []No []Yes  Antiplatelet drug therapy use last 5 days  []No []Yes  Other anticoagulant use last 5 days  []No []Yes    Current Facility-Administered Medications:     0.45 % sodium chloride infusion, , Intravenous, Continuous, Feng Rawls MD, Last Rate: 20 mL/hr at 01/08/20 0818    fentaNYL (SUBLIMAZE) injection 50 mcg, 50 mcg, Intravenous, Once, Feng Rawls MD    midazolam (VERSED) injection 1 mg, 1 mg, Intravenous, PRN, Feng Rawls MD    insulin lispro (HUMALOG) injection vial 0-12 Units, 0-12 Units, Subcutaneous, TID Lianna MD, 2 Units at 01/07/20 1811    insulin lispro (HUMALOG) injection vial 0-6 Units, 0-6 Units, Subcutaneous, Nightly, Lianna Caba MD, 1 Units at 01/07/20 2112    insulin lispro (HUMALOG) injection vial 5 Units, 5 Units, Subcutaneous, TID , Lianna Caba MD, 5 Units at 01/07/20 1809    lidocaine 4 % MD Maykel     Additional information:       VITAL SIGNS   Vitals:    01/08/20 0805   BP: 139/67   Pulse: 69   Resp: 18   Temp: 97.9 °F (36.6 °C)   SpO2: 99%       PHYSICAL:   Heart:  [x]Regular rate and rhythm  []Other:    Lungs:  [x]Clear    []Other:    Abdomen: [x]Soft    []Other:    Mental Status: [x]Alert & Oriented  []Other:      PLANNED PROCEDURE   [x]Biospy []Arteriogram    []Drainage  []Fistulogram []IV access       []Dialysis catheter  []IVC filter []Dialysis catheter []Biliary drainage  []Other:  SEDATION  Planned agent:[x]Midazolam []Meperidine [x]Sublimaze []Morphine  []Diazepam  []Other:     ASA Classification:  []1 []2 [x]3 []4 []5  Class 1: A normal healthy patient  Class 2: Pt with mild to moderate systemic disease  Class 3: Severe systemic disease or disturbance  Class 4: Severe systemic disorders that are already life threatening. Class 5: Moribund pt with little chances of survival, for more than 24 hours. Mallampati I Airway Classification:   []1 []2 [x]3 []4    [x]Pre-procedure diagnostic studies complete and results available. Comment:    [x]Previous sedation/anesthesia experiences assessed. Comment:    [x]The patient is an appropriate candidate to undergo the planned procedure sedation and anesthesia. (Refer to nursing sedation/analgesia documentation record)  [x]Formulation and discussion of sedation/procedure plan, risks, and expectations with patient and/or responsible adult completed. [x]Patient examined immediately prior to the procedure. (Refer to nursing sedation/analgesia documentation record)    Efrain Ventura MD  Electronically signed 1/8/2020 at 8:54 AM

## 2020-01-08 NOTE — PROGRESS NOTES
[facility]  PHYSICAL THERAPY MISSED TREATMENT NOTE  ACUTE CARE  [facility] MED SURG 8A          Pt on bedrest for 2 hours following biopsy at time of attempt (late AM). Will likely try back tomorrow.      Missed Treatment  [facility] [id number]

## 2020-01-08 NOTE — ANESTHESIA POST-OP
[address] [address]  Sedation/Analgesia Post Sedation Record    Pt Name: Justus Siemens  MRN: [id number]  YOB: 1934  Procedure Performed By: Eriberto Arias. Rosina Inman MD  Primary Care Physician: Aliyah Hassan MD    POST-PROCEDURE    Physicians/Assistants: Gee Inman MD  Procedure Performed: ct guided biopsy right lung    Sedation/Anesthesia: Conscious Sedation with Fentanyl & Versed   Estimated Blood Loss:          None  Specimens Removed:  []None [x]Other:Five 20g cores     Disposition of Specimen:  [x]Pathology []Other      Complications:   [x]None Immediate []Other:       Post-procedure Diagnosis/Findings:      Minimal perimass hemorrhage , no pneumothorax         Recommendations: Follow post-procedure orders. Gee Inman MD  Electronically signed 1/8/2020 at 9:46 AM

## 2020-01-08 NOTE — PROGRESS NOTES
[facility]  INPATIENT OCCUPATIONAL THERAPY  [facility] SURG 8A  EVALUATION    Time:    Time In: 9482  Time Out: 1505  Timed Code Treatment Minutes: 10 Minutes  Minutes: 22          Date: 2020  Patient Name: Dajuan Gallardo,   Gender: female      MRN: [id number]  : 1934  (80 y.o.)  Referring Practitioner: ZORAIDA TYLER CNP  Diagnosis: intractable back pain  Additional Pertinent Hx: Per ER note on 1/3/2020: 80 y.o. female who presents to the emergency department for evaluation of complaints/symptoms in result of a fall taking place on 19. The patient was standing in the kitchen at a family [place] party when she stepped backwards and lost her footing over a chair. Patient states she fell flat on her back, hitting her head on the patio door in the duration of this fall. This fall was witnessed. There was no LOC. Patient was helped up by family members and has been ambulatory since the fall. She presents here with complaints of lower back as well as right rib pain with gradual onset since the fall. She also had an episode of urinary incontinence early this morning which is unusual for her.   Pt found to have Acute pathologic compression fracture of T2. s/p kyphoplasty 2020 & lung biopsy on 20    Restrictions/Precautions:  Restrictions/Precautions: Fall Risk  Position Activity Restriction  Spinal Precautions: No Bending, No Lifting, No Twisting  Other position/activity restrictions: T2 compression fracture-kyphoplasty  on 2020; had lung biopsy 2020    Subjective  Chart Reviewed: Yes, Orders, Progress Notes, History and Physical  Patient assessed for rehabilitation services?: Yes  Family / Caregiver Present: No    Subjective: cooperative, tangential in conversation    Pain:  Pain Assessment  Patient Currently in Pain: Yes(1 spot on back)  Patient's Stated Pain Goal: 6    Social/Functional History:  Lives With: Alone  Type of Home: House  Home Layout: One level  Home Access:

## 2020-01-08 NOTE — PROGRESS NOTES
8425 Pt arrived to IR hold room for Lung Biopsy. 9400 Procedure explained using teach back method. Pt states understanding. 200 Dr Fabián Ventura into assess patient and explain procedure. 6412 This procedure has been fully reviewed with the patient and written informed consent has been obtained. 0900 Patient assisted to table. Monitor attached to patient. Comfort ensured. 2017 Pre-procedure images obtained. [id number] Procedure begins. 0660 Pathology arrived  5984 Procedure complete. 5 Samples obtained. 0446 Post-procedure images obtained. 4640 Monitor removed. Patient assisted to cart in semi fowlers position. Comfort ensured. 4401 Patient transported to [phone] in stable condition.

## 2020-01-08 NOTE — DISCHARGE INSTR - COC
Continuity of Care Form    Patient Name: Seth Toure   :  1934  MRN:  [id number]    Admit date:  1/3/2020  Discharge date:  01/10/2020    Code Status Order: Full Code   Advance Directives:   [facility] Documentation     Date/Time Healthcare Directive Type of Healthcare Directive Copy in [address] Agent's Name Healthcare Agent's Phone Number    20 1877  Yes, patient has an advance directive for healthcare treatment  Durable power of  for health care  No, copy requested from family  Adult Children  --  --    20 1632  Yes, patient has an advance directive for healthcare treatment  Living will  No, copy requested from family  --  --  --          Admitting Physician:  Larry Toscano MD  PCP: Jazmin Simon MD    Discharging Nurse: [facility] Unit/Room#: 8A-09/009-A  Discharging Unit Phone Number: [phone]    Emergency Contact:   Extended Emergency Contact Information  Primary Emergency Contact: Lamar Trinidad   [facility] [address] Phone: [phone]  Relation: Other  Secondary Emergency Contact: [facility] [address] [facility] [address] Phone: [phone]  Relation: Other    Past Surgical History:  Past Surgical History:   Procedure Laterality Date    BREAST SURGERY Left     lumpectomy    EYE SURGERY  2006    cataracts    HYSTERECTOMY      OTHER SURGICAL HISTORY Left 2016    Re-excision deep margins left breast    TONSILLECTOMY      as a child       Immunization History:   Immunization History   Administered Date(s) Administered    Influenza Vaccine, unspecified formulation 10/01/2016    Pneumococcal Conjugate 13-valent (Emi Cornell) 10/01/2016       Active Problems:  Patient Active Problem List   Diagnosis Code    Carcinoma of left breast upper inner quadrant ([facility] [address] C50.212    Type 2 diabetes mellitus without complication ([facility] [address] K99.9    Essential hypertension I10    Acquired hypothyroidism E03.9    S/P lumpectomy and sln bx, left breast Z98.890    Intractable pain R52    Malignant neoplasm of upper lobe of lung (HCC) C34.10    Compression fracture of T2 vertebra (HCC) S22.020A    Pathologic compression fracture of thoracic vertebra (HCC) M48.54XA       Isolation/Infection:   Isolation          No Isolation        Patient Infection Status     None to display          Nurse Assessment:  Last Vital Signs: BP (!) 151/72   Pulse 74   Temp 97.9 °F (36.6 °C) (Oral)   Resp 18   Ht 5' 3\" (1.6 m)   Wt 172 lb 12.8 oz (78.4 kg)   SpO2 95%   BMI 30.61 kg/m²     Last documented pain score (0-10 scale): Pain Level: 0  Last Weight:   Wt Readings from Last 1 Encounters:   01/06/20 172 lb 12.8 oz (78.4 kg)     Mental Status:  oriented and alert    IV Access:  - None    Nursing Mobility/ADLs:  Walking   Assisted  Transfer  Assisted  Bathing  Assisted  Dressing  Assisted  Toileting  Assisted  Feeding  Independent  Med Admin  Dependent  Med Delivery   whole    Wound Care Documentation and Therapy:        Elimination:  Continence:   · Bowel: Yes  · Bladder: No  Urinary Catheter: None   Colostomy/Ileostomy/Ileal Conduit: No       Date of Last BM: 01/09/2020    Intake/Output Summary (Last 24 hours) at 1/8/2020 1333  Last data filed at 1/8/2020 0325  Gross per 24 hour   Intake 2354.09 ml   Output 0 ml   Net 2354.09 ml     I/O last 3 completed shifts: In: 2354.1 [P.O.:300; I.V.:2054.1]  Out: 0     Safety Concerns: At Risk for Falls    Impairments/Disabilities:      None    Nutrition Therapy:  Current Nutrition Therapy:   - Oral Diet:  General and Low Sodium (3-4gm)    Routes of Feeding: Oral  Liquids: No Restrictions  Daily Fluid Restriction: no  Last Modified Barium Swallow with Video (Video Swallowing Test): not done    Treatments at the Time of Hospital Discharge:   Respiratory Treatments: None  Oxygen Therapy:  is not on home oxygen therapy.   Ventilator:    - No ventilator support    Rehab Therapies: Physical Therapy and Occupational Therapy  Weight Bearing Status/Restrictions: No weight bearing restirctions  Other Medical Equipment (for information only, NOT a DME order):  cane  Other Treatments: chesck blood sugars ACHS    Patient's personal belongings (please select all that are sent with patient):  Glasses    RN SIGNATURE:  Electronically signed by Meera Gtz RN on 1/10/20 at 12:49 PM    CASE MANAGEMENT/SOCIAL WORK SECTION    Inpatient Status Date: 1/3/2020    Readmission Risk Assessment Score:  Readmission Risk              Risk of Unplanned Readmission:        16           Discharging to Facility/ Agency   · Name: Stacie Hanna  · Address: [facility] [place], [address] [place] [address],[address]  · Phone: [phone]  · Fax: [phone]    Dialysis Facility (if applicable)   · Name:  · Address:  · Dialysis Schedule:  · Phone:  · Fax:    / signature: Electronically signed by SAKINA Andrews on 1/8/20 at 1:35 PM    PHYSICIAN SECTION    Prognosis: Good    Condition at Discharge: Stable    Rehab Potential (if transferring to Rehab): Good    Recommended Labs or Other Treatments After Discharge:     F/u with Lynda Reynolds in outpatient  pain clinic on 1/20/2020 for suture removal.   Portable CXR on 1/11/2020 and then as needed if patient becomes symptomatic with shortness of breath  Portable CXR on 0/50/8834    Physician Certification: I certify the above information and transfer of Shahab Hugo  is necessary for the continuing treatment of the diagnosis listed and that she requires [facility] for greater 30 days.      Update Admission H&P: No change in H&P    PHYSICIAN SIGNATURE:  Electronically signed by Yue Salguero MD on 1/10/20 at 10:43 AM

## 2020-01-08 NOTE — FLOWSHEET NOTE
[address] [place] [address]  PHYSICAL THERAPY MISSED TREATMENT NOTE  ACUTE CARE  [facility] SURG 8A              Missed Treatment  OT planning to enter room to eval pt, attempted pt this am- needing 2 hours bed rest due to lung biopsy. Will try back tomorrow.

## 2020-01-08 NOTE — PROGRESS NOTES
Formulation and discussion of sedation / procedure plans, risks, benefits, side effects and alternatives with patient and/or responsible adult completed. Electronically signed by Flaquito Horvath.  Jennifer Fenton MD on 1/8/2020 at 8:53 AM

## 2020-01-09 ENCOUNTER — APPOINTMENT (OUTPATIENT)
Dept: GENERAL RADIOLOGY | Age: 85
DRG: 478 | End: 2020-01-09
Payer: MEDICARE

## 2020-01-09 LAB
ANION GAP SERPL CALCULATED.3IONS-SCNC: 12 MEQ/L (ref 8–16)
BUN BLDV-MCNC: 20 MG/DL (ref 7–22)
CALCIUM SERPL-MCNC: 8.9 MG/DL (ref 8.5–10.5)
CHLORIDE BLD-SCNC: 103 MEQ/L (ref 98–111)
CO2: 21 MEQ/L (ref 23–33)
CREAT SERPL-MCNC: 0.8 MG/DL (ref 0.4–1.2)
ERYTHROCYTE [DISTWIDTH] IN BLOOD BY AUTOMATED COUNT: 12.7 % (ref 11.5–14.5)
ERYTHROCYTE [DISTWIDTH] IN BLOOD BY AUTOMATED COUNT: 43.6 FL (ref 35–45)
GFR SERPL CREATININE-BSD FRML MDRD: 68 ML/MIN/1.73M2
GLUCOSE BLD-MCNC: 126 MG/DL (ref 70–108)
GLUCOSE BLD-MCNC: 137 MG/DL (ref 70–108)
GLUCOSE BLD-MCNC: 144 MG/DL (ref 70–108)
GLUCOSE BLD-MCNC: 249 MG/DL (ref 70–108)
GLUCOSE BLD-MCNC: 84 MG/DL (ref 70–108)
HCT VFR BLD CALC: 37.7 % (ref 37–47)
HEMOGLOBIN: 12.1 GM/DL (ref 12–16)
MAGNESIUM: 1.8 MG/DL (ref 1.6–2.4)
MCH RBC QN AUTO: 30.3 PG (ref 26–33)
MCHC RBC AUTO-ENTMCNC: 32.1 GM/DL (ref 32.2–35.5)
MCV RBC AUTO: 94.5 FL (ref 81–99)
PLATELET # BLD: 345 THOU/MM3 (ref 130–400)
PMV BLD AUTO: 9.4 FL (ref 9.4–12.4)
POTASSIUM SERPL-SCNC: 4.6 MEQ/L (ref 3.5–5.2)
RBC # BLD: 3.99 MILL/MM3 (ref 4.2–5.4)
SODIUM BLD-SCNC: 136 MEQ/L (ref 135–145)
WBC # BLD: 6.7 THOU/MM3 (ref 4.8–10.8)

## 2020-01-09 PROCEDURE — 99232 SBSQ HOSP IP/OBS MODERATE 35: CPT | Performed by: INTERNAL MEDICINE

## 2020-01-09 PROCEDURE — 6370000000 HC RX 637 (ALT 250 FOR IP): Performed by: PAIN MEDICINE

## 2020-01-09 PROCEDURE — 80048 BASIC METABOLIC PNL TOTAL CA: CPT

## 2020-01-09 PROCEDURE — 99223 1ST HOSP IP/OBS HIGH 75: CPT | Performed by: INTERNAL MEDICINE

## 2020-01-09 PROCEDURE — 6360000002 HC RX W HCPCS: Performed by: INTERNAL MEDICINE

## 2020-01-09 PROCEDURE — 85027 COMPLETE CBC AUTOMATED: CPT

## 2020-01-09 PROCEDURE — 1200000000 HC SEMI PRIVATE

## 2020-01-09 PROCEDURE — 2709999900 HC NON-CHARGEABLE SUPPLY

## 2020-01-09 PROCEDURE — 71046 X-RAY EXAM CHEST 2 VIEWS: CPT

## 2020-01-09 PROCEDURE — 97110 THERAPEUTIC EXERCISES: CPT

## 2020-01-09 PROCEDURE — 83735 ASSAY OF MAGNESIUM: CPT

## 2020-01-09 PROCEDURE — 99231 SBSQ HOSP IP/OBS SF/LOW 25: CPT | Performed by: NURSE PRACTITIONER

## 2020-01-09 PROCEDURE — 71045 X-RAY EXAM CHEST 1 VIEW: CPT

## 2020-01-09 PROCEDURE — 6370000000 HC RX 637 (ALT 250 FOR IP): Performed by: FAMILY MEDICINE

## 2020-01-09 PROCEDURE — 36415 COLL VENOUS BLD VENIPUNCTURE: CPT

## 2020-01-09 PROCEDURE — 6370000000 HC RX 637 (ALT 250 FOR IP): Performed by: INTERNAL MEDICINE

## 2020-01-09 PROCEDURE — 94760 N-INVAS EAR/PLS OXIMETRY 1: CPT

## 2020-01-09 PROCEDURE — 82948 REAGENT STRIP/BLOOD GLUCOSE: CPT

## 2020-01-09 PROCEDURE — 97116 GAIT TRAINING THERAPY: CPT

## 2020-01-09 PROCEDURE — 2580000003 HC RX 258: Performed by: FAMILY MEDICINE

## 2020-01-09 RX ORDER — TRAMADOL HYDROCHLORIDE 50 MG/1
50 TABLET ORAL EVERY 12 HOURS PRN
Qty: 6 TABLET | Refills: 0 | Status: SHIPPED | OUTPATIENT
Start: 2020-01-09 | End: 2020-01-10

## 2020-01-09 RX ORDER — PSEUDOEPHEDRINE HCL 30 MG
100 TABLET ORAL 2 TIMES DAILY
Qty: 60 CAPSULE | Refills: 0 | Status: SHIPPED | OUTPATIENT
Start: 2020-01-09

## 2020-01-09 RX ORDER — LIDOCAINE 4 G/G
1 PATCH TOPICAL DAILY
Qty: 30 PATCH | Refills: 0 | Status: SHIPPED | OUTPATIENT
Start: 2020-01-10

## 2020-01-09 RX ADMIN — VENLAFAXINE 75 MG: 37.5 TABLET ORAL at 09:13

## 2020-01-09 RX ADMIN — DOCUSATE SODIUM 100 MG: 100 CAPSULE, LIQUID FILLED ORAL at 09:14

## 2020-01-09 RX ADMIN — CALCIUM 500 MG: 500 TABLET ORAL at 09:14

## 2020-01-09 RX ADMIN — DOCUSATE SODIUM 100 MG: 100 CAPSULE, LIQUID FILLED ORAL at 20:20

## 2020-01-09 RX ADMIN — Medication 10 ML: at 09:19

## 2020-01-09 RX ADMIN — METFORMIN HYDROCHLORIDE 500 MG: 500 TABLET ORAL at 18:01

## 2020-01-09 RX ADMIN — INSULIN LISPRO 5 UNITS: 100 INJECTION, SOLUTION INTRAVENOUS; SUBCUTANEOUS at 13:37

## 2020-01-09 RX ADMIN — AMITRIPTYLINE HYDROCHLORIDE 25 MG: 25 TABLET, FILM COATED ORAL at 20:19

## 2020-01-09 RX ADMIN — LEVOTHYROXINE SODIUM 88 MCG: 88 TABLET ORAL at 05:20

## 2020-01-09 RX ADMIN — ACETAMINOPHEN 650 MG: 325 TABLET ORAL at 20:19

## 2020-01-09 RX ADMIN — METOPROLOL SUCCINATE 25 MG: 25 TABLET, FILM COATED, EXTENDED RELEASE ORAL at 09:13

## 2020-01-09 RX ADMIN — ENOXAPARIN SODIUM 40 MG: 40 INJECTION SUBCUTANEOUS at 20:21

## 2020-01-09 RX ADMIN — PRAVASTATIN SODIUM 20 MG: 20 TABLET ORAL at 20:19

## 2020-01-09 RX ADMIN — INSULIN LISPRO 5 UNITS: 100 INJECTION, SOLUTION INTRAVENOUS; SUBCUTANEOUS at 09:14

## 2020-01-09 RX ADMIN — Medication 10 ML: at 20:22

## 2020-01-09 RX ADMIN — RAMIPRIL 2.5 MG: 2.5 CAPSULE ORAL at 09:13

## 2020-01-09 RX ADMIN — METFORMIN HYDROCHLORIDE 500 MG: 500 TABLET ORAL at 09:13

## 2020-01-09 RX ADMIN — INSULIN LISPRO 2 UNITS: 100 INJECTION, SOLUTION INTRAVENOUS; SUBCUTANEOUS at 20:22

## 2020-01-09 RX ADMIN — DOXYCYCLINE HYCLATE 50 MG: 100 TABLET, COATED ORAL at 09:14

## 2020-01-09 ASSESSMENT — PAIN SCALES - GENERAL
PAINLEVEL_OUTOF10: 0

## 2020-01-09 NOTE — CONSULTS
[facility]      Patient - Robby Hamilton   MRN -  [id number]   [facility] # - [de-identified]   - 1934      Date of Admission -  1/3/2020  7:51 AM  Date of evaluation -  2020  Room - 8A--A   Hospital Day - [address] Alejandro Liz MD Primary Care Physician - Lucy Willams MD     Problem List      Active Hospital Problems    Diagnosis Date Noted    Pneumothorax of right lung after biopsy [J95.811]     Fall [W19. XXXA]     Head trauma [S09.90XA]     Closed fracture of second thoracic vertebra ([address] [S22.029A]     Mass of left lung [R91.8]     Uncontrolled type 2 diabetes mellitus with hyperglycemia ([address] [E11.65]     History of breast cancer [Z85.3]     Urinary incontinence [R32]     Hyperlipidemia [E78.5]     Pathologic compression fracture of thoracic vertebra ([address] [X48.33LM]     Malignant neoplasm of upper lobe of lung ([address] [C34.10]     Compression fracture of T2 vertebra ([address] [S22.020A]     Intractable pain [R52] 2020    Uncontrolled hypertension [I10] 2016     Reason for Consult    For management of right side Pneumothorax. HPI   History Obtained From: Patient and electronic medical record. Robby Hamilton is a 80 y.o. female  was initially admitted under hospitalist service. Pulmonary medicine was consulted for further management of Pneumothorax on right side noticed after having a CT guided biopsy of right lung mass. The patient is a 80 y.o. 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    She was admitted on 1/3/20 under hospitalist service for evaluation of pain and fall taken place on 19.  During her work up she was found to have a lobulated 2.3 x 3 cm lateral right upper lobe pulmonary mass consistent with primary lung carcinoma versus a WC    levothyroxine  88 mcg Oral Daily    amitriptyline  25 mg Oral Nightly    pravastatin  20 mg Oral Nightly    doxycycline hyclate  50 mg Oral Daily    calcium elemental  500 mg Oral Daily    docusate sodium  100 mg Oral BID    venlafaxine  75 mg Oral Daily    ramipril  2.5 mg Oral Daily    metoprolol succinate  25 mg Oral Daily    aspirin  81 mg Oral Daily    acetaminophen  650 mg Oral Nightly    sodium chloride flush  10 mL Intravenous 2 times per day     midazolam, acetaminophen, traMADol, sodium chloride flush, magnesium hydroxide, ondansetron, glucose, glucagon (rDNA)  IV Drips/Infusions    Home Medications  Medications Prior to Admission: metoprolol succinate (TOPROL XL) 25 MG extended release tablet, Take 25 mg by mouth daily   desonide (DESOWEN) 0.05 % cream, Apply topically 2 times daily Apply topically 2 times daily. acetaminophen (TYLENOL) 325 MG tablet, Take 650 mg by mouth as needed   ramipril (ALTACE) 5 MG capsule, Take 2.5 mg by mouth daily   venlafaxine (EFFEXOR) 37.5 MG tablet, Take 75 mg by mouth daily   Multiple Vitamins-Minerals (MULTIVITAMIN PO), Take by mouth daily  Multiple Vitamins-Minerals (PRESERVISION AREDS PO), Take 1 capsule by mouth 2 times daily  levothyroxine (SYNTHROID) 125 MCG tablet, Take 88 mcg by mouth Daily   amitriptyline (ELAVIL) 10 MG tablet, Take 25 mg by mouth nightly   pravastatin (PRAVACHOL) 20 MG tablet, Take 20 mg by mouth daily  doxycycline hyclate (PERIOSTAT) 20 MG tablet, Take 50 mg by mouth daily   calcium carbonate 600 MG TABS tablet, Take 1 tablet by mouth 2 times daily   aspirin 81 MG tablet, Take 81 mg by mouth daily  [DISCONTINUED] docusate sodium (COLACE) 100 MG capsule, Take 100 mg by mouth daily   [DISCONTINUED] metFORMIN (GLUCOPHAGE) 500 MG tablet, Take 1,000 mg by mouth 2 times daily (with meals)   Diet    DIET GENERAL; Low Sodium (2 GM)  Allergies    Patient has no known allergies.   Family History          Problem Relation Age of Onset    weakness. Extremities: No edema. Musculoskeletal: No complaints. Pain at CT guided biopsy site I.e back of right side of chest.  Genitourinary: No complaints. Hematological: Negative. Psychiatric/Behavioral: Negative. Skin: No itching. Vitals     height is 5' 3\" (1.6 m) and weight is 172 lb 12.8 oz (78.4 kg). Her oral temperature is 97.4 °F (36.3 °C). Her blood pressure is 156/78 (abnormal) and her pulse is 77. Her respiration is 16 and oxygen saturation is 95%. Body mass index is 30.61 kg/m². SUPPLEMENTAL O2: O2 Flow Rate (L/min): 3 L/min       I/O        Intake/Output Summary (Last 24 hours) at 1/9/2020 1152  Last data filed at 1/9/2020 0515  Gross per 24 hour   Intake 850 ml   Output --   Net 850 ml     I/O last 3 completed shifts: In: 65 [P.O.:840; I.V.:10]  Out: -    Patient Vitals for the past 96 hrs (Last 3 readings):   Weight   01/06/20 0540 172 lb 12.8 oz (78.4 kg)       Exam   General Appearance: moderately built, moderately nourished in no acute distress on room air. HEENT: Normal, Head is normocephalic, atraumatic. Oropharynx is clear and moist.  No oral thrush. PERRL  Neck - Supple, No JVD present. No tracheal deviation. Lungs - Bilateral air entry present. Bilateral good breath sounds heard. No expiratory wheezes. No rales. Cardiovascular - Heart sounds are normal.  Regular rhythm normal rate without murmur, gallop or rub. Abdomen - Soft, nontender, nondistended, no masses or organomegaly  Neurologic - Awake, alert, oriented. There are no focal motor or sensory deficits  Extremities - No cyanosis, clubbing or edema. Musculoskeletal: Normal range of motion. Patient exhibits no tenderness. Lymphadenopathy:  No cervical adenopathy. Psychiatric: Patient  has a normal mood and affect. Skin - No bruising or bleeding.  Biopsy site was noted on the posterior aspect of right side of chest.    Labs  - Old records and notes have been reviewed in [facility]   ABG  No results found for: PH, PO2, PCO2, HCO3, O2SAT  No results found for: IFIO2, MODE, SETTIDVOL, SETPEEP  CBC  Recent Labs     01/09/20  0509   WBC 6.7   RBC 3.99*   HGB 12.1   HCT 37.7   MCV 94.5   MCH 30.3   MCHC 32.1*      MPV 9.4      BMP  Recent Labs     01/09/20  0509      K 4.6      CO2 21*   BUN 20   CREATININE 0.8   GLUCOSE 144*   MG 1.8   CALCIUM 8.9     LFT  No results for input(s): AST, ALT, ALB, BILITOT, ALKPHOS, LIPASE in the last 72 hours. Invalid input(s): AMYLASE  TROP  No results found for: TROPONINT  BNP  No results for input(s): BNP in the last 72 hours. Lactic Acid  No results for input(s): LACTA in the last 72 hours. INR  No results for input(s): INR, PROTIME in the last 72 hours. PTT  No results for input(s): APTT in the last 72 hours. Glucose  Recent Labs     01/08/20  1712 01/08/20 2029 01/09/20  0741   POCGLU 165* 220* 137*     UA No results for input(s): SPECGRAV, PHUR, COLORU, CLARITYU, MUCUS, PROTEINU, BLOODU, RBCUA, WBCUA, BACTERIA, NITRU, GLUCOSEU, BILIRUBINUR, UROBILINOGEN, KETUA, LABCAST, LABCASTTY, AMORPHOS in the last 72 hours. Invalid input(s): CRYSTALS. PFTs   None in Epic. Sleep studies   None in Epic. Cultures    None in Epic    EKG     Echocardiogram   None in Epic. Radiology    CXR  Chest Xray:  Jan 9, 2020   PROCEDURE: XR CHEST 1 VIEW     No significant change in the right apical pneumothorax which is less than 10%. Continued follow-up is recommended. Jan 9, 2020   PROCEDURE: XR CHEST (2 VW)   Slight increase in size of the right pneumothorax. CTA of chest Scans  (See actual reports for details)  [place] 3, 2020   PROCEDURE: CT CHEST W CONTRAST   Acute minimally displaced fracture of the anterior T2 vertebral body with an age indeterminate mild to moderate compression fracture component. Associated mild prevertebral edema or hemorrhage. 2.3 x 3 cm right upper lobe pulmonary mass consistent with primary lung carcinoma versus metastasis.  8 x 7 mm left upper lobe pulmonary nodule. No acute traumatic abnormality of the chest. No acute pneumonia. Bilateral multilobar atelectasis. 1.5 x 1.3 cm right thyroid nodule, consider nonemergent ultrasound for further evaluation. Postoperative changes of the left breast.         CT needle biopsy:  Jan 8, 2020   PROCEDURE: CT NEEDLE BIOPSY LUNG PERCUTANEOUS, CT GUIDED NEEDLE PLACEMENT   Uncomplicated CT-guided biopsy right upper lung mass. No immediate complications. Assessment   -Right side Iatrogenic Pneumothorax after CT guided biopsy of right upper lobe lung mass- stable at 10% per latest CXR. Patient is hemodynamically stable with no acute respiratory distress on room air.  -Left breast cancer-Invasive, poorly differentiated ductal carcinoma, [place] score III currently following with Quan Freeman MD- Medical Oncologist.  -Acute minimally displaced fracture of the anterior T2 vertebral body with an age indeterminate mild to moderate compression fracture component.  -8 x 7 mm left upper lobe pulmonary nodule. -Depression.  -Hypertension.  -Hypothyroidism    Plan   -Will place patient on 100% NRB. -Follow CT guided biopsy pathology report. -Will repeat a CXR PA and lateral views in AM to follow her right side pneumothorax.  -Pain management per primary service.  -Lovenox 40mg SQ daily for DVT prophylaxis. Yaritza Hagernereducated about my impression and plan. She verbalizes understanding.  -Case discussed with Registered nurse taking care of patient. Questions and concerns addressed.     \"Thank you for asking us to see this patient\"       Electronically signed by   Veronika Hammer MD on 1/9/2020 at 11:52 AM

## 2020-01-09 NOTE — PROGRESS NOTES
Hospitalist Progress Note    Patient:  Deb Nogueira  YOB: 1934  MRN: [id number]   PCP: Guerita Farmer MD         Acct: [de-identified]  Unit/Bed: [facility]09/009-    Date of Admission: 1/3/2020      ASSESSMENT     1. Diffuse intractable pain: upper thoracic spine, right anterior/posterior chest wall, right knee. Pain management seeing. S/p kyphoplasty of T2. Stable at rest- exacerbated with movement. On Lidocaine patch, Tramadol and Tylenol prn  2. Pneumothorax as complication of lung biopsy on 1/8: ~5-10%. Continue to monitor. No current increased O2 requirement but repeat CXR on 1/9 shows increasing size. Pulmonology consulted   3. Fall complicated by head trauma and T2 fracture s/p kyphoplasty on 1/7 per Dr. Garnet Oppenheim. Biopsy did not show evidence for malignancy  4. RUL lung mass: Measures 2.3 x 3 cm. S/p lung biopsy on 1/8. Concern for primary or metastatic malignancy  5. Pancreatic lesion?: Initial concern for pancreatic lesion on CT scan of abdomen/pelvis which showed 2 subcentimeter nodular foci in duct of pancreatic tail,  however MRI showed pancreatic atrophy with moderate ductal dilatation  Wit hpancreatic atrophy as well as moderate ductal dilatation. CA 19-9 unremarkable. 6. Uncontrolled DM Type 2: Hgb A1C 10.2. Will need discharge on metformin and  ISS and Lantus. Note decrease in dose of metformin to 500 mg bid  7. Recent hx of left breast CA s/p lumpectomy and radiation: Oncology following  8. Urinary incontinence:  Continue to monitor  9. Acquired hypothyroidism: levothyroxine  10. Uncontrolled essential HTN: on ramipril and metoprolol. May need to increase dose of metoprolol, however pneumothorax may be contributory  11. Hyperlipidemia    PLAN     1. Repeat CXR in am to monitor pneumothorax   2. Consider increasing metoprolol due to worsening BP but possibility that BP increased secondary to current pneumothorax  3.  Pulmonology consulted to address increase in size of RN. Procedure duration: 30 minutes MEDICATIONS: Versed 1 mg IV Fentanyl 25 mcg IV. Lidocaine 2% 9 mL. Patient was scanned in the supine position with radiopaque markers in place. Level for access was chosen using an anterior approach. The anterior chest was prepped and draped in sterile fashion. A 19-gauge Lam needle was advanced to the right upper lung mass. After confirmation of position  a 20-gauge sample was obtained. Initial sample was not satisfactory. Second sample was obtained which demonstrated abnormal cells. 3 more samples were then obtained and the needle removed. The patient tolerated this procedure well. All CT scans at this facility use dose modulation, iterative reconstruction, and/or weight-based dosing when appropriate to reduce radiation dose to as low as reasonably achievable. FINDINGS: Moderate. The tumoral hemorrhage. No pneumothorax. Adequate samples according to pathology. Uncomplicated CT-guided biopsy right upper lung mass. No immediate complications. **This report has been created using voice recognition software. It may contain minor errors which are inherent in voice recognition technology. ** Final report electronically signed by Dr. Blake Jaimes on 1/8/2020 10:12 AM    Mri Thoracic Spine Wo Contrast    Result Date: 1/6/2020  PROCEDURE: MRI THORACIC SPINE WO CONTRAST CLINICAL INFORMATION: check acuity of compression fracture, plan for Kyphoplasty. Maira Gobble a few weeks ago. COMPARISON: Thoracic spine CT 1/3/2020. TECHNIQUE: Sagittal T1, T2 and STIR sequences were obtained through the thoracic spine. Axial T2-weighted images were obtained through the discs. FINDINGS: There is a kyphosis of the thoracic spine. There is a compression fracture of T2. There is diffusely abnormal low signal on the T1-weighted images. There is more heterogeneous signal on the T2-weighted images. There is edema on the STIR sequence. This is consistent with an acute compression fracture.  The height loss is All CT scans at this facility use dose modulation, iterative reconstruction, and/or weight-based dosing when appropriate to reduce radiation dose to as low as reasonably achievable. FINDINGS: Lower chest: See the accompanying chest CT report. Liver: There are scattered calcified granulomas. There is a 4 mm round hypodense lesion in the anterior right lobe of the liver, too small to characterize but possibly a cyst.. There is no liver mass or intrahepatic biliary dilatation. Gallbladder/Biliary tree: Unremarkable. No calcified gallstones. No extrahepatic biliary dilatation. Spleen: There are calcified granulomas. No splenomegaly. Pancreas: There is severe pancreatic atrophy with diffuse severe dilatation of the pancreatic duct. No obstructing process is identified. There are 2 subcentimeter nodular foci in the pancreatic duct in the tail (sagittal image 26, coronal image 27). Neoplasm not excluded. There are no findings to suggest acute pancreatitis. Adrenal glands: There is thickening of the limbs of the adrenal glands. No mass. Kidneys and ureters: There is a 1.2 x 0.9 cm ovoid hypodense lesion in the extreme upper pole of the left kidney, likely a cyst. There is bilateral renal scarring. No hydroureteronephrosis. No renal or ureteral calculi. No findings to suggest acute pyelonephritis. Stomach, small bowel, and colon: There is a small hiatal hernia. Stomach and duodenum are otherwise unremarkable. There is colonic diverticulosis without diverticulitis. Small bowel and colon are otherwise unremarkable. There is no evidence of bowel wall thickening. No evidence of bowel obstruction. Appendix: Unremarkable. No findings to suggest acute appendicitis. Omentum and mesentery: Unremarkable Aorta, vascular: No aortic aneurysm or dissection. No significant vascular abnormality. Reproductive: The uterus is absent consistent with hysterectomy. The adnexal regions are unremarkable. There is a pessary in the vagina.  Bladder: enhancement of the portal and the hepatic veins. There is normal enhancement of the portal venous confluence, SMV, and splenic vein. GALLBLADDER: The gallbladder is filled with multiple small stones. No wall thickening or pericholecystic fluid. BILIARY TREE: The common duct is normal size measuring 4 mm There is no gross choledocholithiasis. PANCREAS: The pancreas is markedly atrophied. The pancreatic duct is moderately prominent measuring up to 8 mm particularly within the tail of the pancreas. No gross mass is suggested. There is no peripancreatic fluid or inflammation. SPLEEN:  No mass or enlargement. ADRENAL GLANDS: No mass or enlargement. KIDNEYS: No mass or obstruction. BOWEL: Nonobstructive. FREE AIR/FREE FLUID/INFLAMMATION: None. LYMPHADENOPATHY: There are no pathologically enlarged lymph nodes. ABDOMINAL AORTA/IVC: Unremarkable. LUNG BASES: Unremarkable. MUSCULOSKELETAL: Unremarkable. OTHER: None. A gross pancreatic mass is not seen. There is pancreatic atrophy as well as moderate ductal dilatation. Multiple tiny gallstones are noted **This report has been created using voice recognition software. It may contain minor errors which are inherent in voice recognition technology. ** Final report electronically signed by Dr. Chuckie Corona on 1/4/2020 2:00 PM    Ct Needle Biopsy Lung/mediastinum Percutaneous    Result Date: 1/8/2020  PROCEDURE: CT NEEDLE BIOPSY LUNG PERCUTANEOUS, CT GUIDED NEEDLE PLACEMENT CLINICAL INFORMATION: Breast cancer has been in surveillance. Now with lung mass seen on CT chest . COMPARISON: No prior study. TECHNIQUE: Written informed consent was obtained with the procedure explained to the patient and the patient's daughters. The patient was assessed and transferred to CT. The patient was monitored EKG and pulse oximetry monitoring devices by the radiology RN. Procedure duration: 30 minutes MEDICATIONS: Versed 1 mg IV Fentanyl 25 mcg IV. Lidocaine 2% 9 mL.  Patient was scanned in radiographs. There is otherwise anatomic vertebral body height and alignment. No fracture of the lumbar vertebral column is identified. There is mild osteophytosis at the sacral iliac joints bilaterally. There is disc space narrowing and vacuum disc phenomenon at T8-9 through T11-12 with associated anterior osteophytes. At T12-L1 there is no significant spinal canal or neuroforaminal stenosis. At L1-2 there is a shallow disc bulge without significant spinal canal stenosis and moderate bilateral neural foraminal stenosis in association with mild facet hypertrophy. At L2-3 there is a shallow disc bulge with facet hypertrophy and ligamentum flavum thickening which causes moderate spinal canal stenosis. There is also moderate bilateral foraminal stenosis at this level. At L3-4 there is a shallow disc bulge with prominent facet hypertrophy and ligamentum flavum thickening which causes moderate to severe spinal canal stenosis and moderate to severe left and moderate right neuroforaminal stenosis. At L4-5 there is pronounced facet hypertrophy and ligament flavum thickening causing the anterolisthesis of L4 relative to L5 with partial uncovering the disc and superimposed disc bulge contributing to moderate to severe spinal canal stenosis and mild to moderate left and moderate right neural foraminal stenosis. At L5-S1 there is no significant spinal canal stenosis. There is mild to moderate bilateral neural foraminal stenosis in association with facet hypertrophy and ligamentum flavum thickening. 1. No evidence of acute osseous injury of the lumbar spine. 2. Multilevel degenerative changes with areas of moderate to severe spinal canal stenosis at L3-4 and L4-5 and up to moderate to severe neuroforaminal stenosis. **This report has been created using voice recognition software. It may contain minor errors which are inherent in voice recognition technology. ** Final report electronically signed by Dr. Maritza Major, MD on 1/3/2020 10:32 AM    Ct Thoracic Reconstruction Wo Post Process    Result Date: 1/3/2020  PROCEDURE: CT THORACIC RECONSTRUCTION WO POST PROCESS CLINICAL INFORMATION: T2 compression fracture seen on Cervical spine CT. History of breast cancer. COMPARISON: CT of the cervical spine from the same date. TECHNIQUE: Axial, sagittal and coronal reformatted images of the thoracic spine reconstructed from the patient's chest CT. IV contrast is present. All CT scans at this facility use dose modulation, iterative reconstruction, and/or weight-based dosing when appropriate to reduce radiation dose to as low as reasonably achievable. FINDINGS: Redemonstration of an anterior wedge shape configuration of the T2 vertebral body with approximately 30% anterior height loss with an acute fracture lucency at the anterior aspect of the body. The posterior elements appear intact. There is a mild anterior wedge shape configuration of the T7 vertebral body with approximately 20% anterior height loss without definite acute fracture lucency. There is an exaggerated thoracic kyphosis. There is a levocurvature of the thoracic spine. There is otherwise  anatomic vertebral body height and alignment. No definite focal lytic or sclerotic lesion is identified within the thoracic spine. Visualized portions of the ribs appear intact. There is vacuum disc phenomenon at T6-7 through T11-12 with disc space narrowing at these levels. There is endplate sclerosis and mild osteophytosis at T9-10 through T12-L1 anteriorly. There is no significant spinal canal or neuroforaminal stenosis at any thoracic level. 1. Redemonstration of acute compression deformity of T2 with approximately 30% anterior height loss. No definite underlying focal lesion is identified although MRI is much more sensitive for evaluation of metastatic disease. 2. Chronic appearing mild compression deformity of the T7 vertebral body with approximately 20% anterior height loss.  3. Mild degenerative changes of the thoracic spine without significant spinal canal or neuroforaminal stenosis at any thoracic level. **This report has been created using voice recognition software. It may contain minor errors which are inherent in voice recognition technology. ** Final report electronically signed by Dr. Edil Hitchcock MD on 1/3/2020 3:43 PM    Xr Chest Pa Inspiration 1 Vw    Result Date: 1/8/2020  PROCEDURE: XR CHEST PA INSPIRATION 1 VW CLINICAL INFORMATION: Right lung biopsy, evaluate for pneumothorax . COMPARISON: 1/8/2020 at 11:46 AM TECHNIQUE: Upright PA chest. FINDINGS: There has been interval development of a small right apical pneumothorax. This is no greater than 5%. There is no midline shift or mass effect. There is improved aeration around the right upper lobe nodule. There are no other changes from earlier. Approximately 5% right apical pneumothorax. **This report has been created using voice recognition software. It may contain minor errors which are inherent in voice recognition technology. ** Final report electronically signed by Dr. Jim Arteaga on 1/8/2020 2:02 PM    Xr Chest Pa Inspiration 1 Vw    Result Date: 1/8/2020  PROCEDURE: XR CHEST PA INSPIRATION 1 VW CLINICAL INFORMATION: lung biopsy . COMPARISON: 1/3/2020 TECHNIQUE: PA upright chest. FINDINGS: There is no pneumothorax identified. Heart size normal. No mediastinal widening. Right upper lobe mass with additional haziness around the nodule secondary to recent biopsy. No pneumothorax status post right lung biopsy **This report has been created using voice recognition software. It may contain minor errors which are inherent in voice recognition technology. ** Final report electronically signed by Dr. Jim Arteaga on 1/8/2020 2:00 PM      DVT prophylaxis: [] Lovenox                                 [x] SCDs                                 [] SQ Heparin                                 [] Encourage ambulation           [] Already on Anticoagulation     Disposition:    [x] Home       [] TCU       [] Rehab       [] Psych       [] SNF       [] [place]       [] Other-

## 2020-01-09 NOTE — PROGRESS NOTES
[address] [place] [address]  INPATIENT PHYSICAL THERAPY  DAILY NOTE  [facility] SURG 8A - 8A-09/009-A    Time In: 1059  Time Out: 5172  Timed Code Treatment Minutes: 32 Minutes  Minutes: 32          Date: 2020  Patient Name: Kaylen Khan,  Gender:  female        MRN: [id number]  : 1934  (80 y.o.)     Referring Practitioner: Cristiane Wills NP  Diagnosis: Intractable pain  Additional Pertinent Hx: Kaylen Khan is a 80 y.o. female admitted to [address] [place] [address] on 1/3/2020. Patient presented to [address] [place] [address] with a chief complaint of intractable back pain./ Patient with a history of left breast cancer s/p lumpectomy and radiation therapy, type II diabetes, CKD, lung mass. Patient reports that she developed mid upper back pain after falling in the kitchen 2019at a [employer] party losing footing when her foot caught a chair in which she landed flat on her back. Work up was completed and she was found to have an acute T2 compression fracture maybe related to osteoporosis but metastatic disease could not be ruled out. Patient reports that she does not have any pain at rest when she is still but states that she tries not to move because her pain increases and gets to be severe level. Prior to admission she lived alone and plans on being discharged back home. Discussed that reason for consult was to improve pain control with a Kyphoplasty in which I described procedure in detail with patient and familly in which they are agreeable to proceed.       Prior Level of Function:  Lives With: Alone  Type of Home: House  Home Layout: One level  Home Access: Stairs to enter with rails  Entrance Stairs - Number of Steps: 2  Entrance Stairs - Rails: Both  Home Equipment: Verlon Feeler, 4 wheeled walker   Bathroom Shower/Tub: Tub/Shower unit  Bathroom Toilet: Standard  Bathroom Equipment: Grab bars in shower, Shower chair    ADL Assistance: [address] [place] [address] Avenue: Needs assistance(family brings food in)  Ambulation Assistance: Independent  Transfer Assistance: Independent  Active : Yes  Additional Comments: Pt states amb with SC or rollator; has aide that go with her for groceries 1x/week, daughter-in-law assists with housework, pt I with dressing, showers, laundry; has Life Alert    Restrictions/Precautions:  Restrictions/Precautions: Fall Risk  Position Activity Restriction  Spinal Precautions: No Bending, No Lifting, No Twisting  Other position/activity restrictions: T2 compression fracture-kyphoplasty  on 1/7/2020; had lung biopsy 1/8/2020    SUBJECTIVE: RN approved session. Pt in bed upon arrival and agrees to therapy. Pt pleasant and cooperative. pts family present for second half of session. Pt used restroom during session. PAIN: upper back, did not rate, complaints with mobility. OBJECTIVE:  Bed Mobility:  Rolling to Right: Minimal Assistance, with verbal cues , with increased time for completion   Supine to Sit: Minimal Assistance, with verbal cues , with increased time for completion  Sit to Supine: Minimal Assistance, with verbal cues , with increased time for completion   Scooting: Contact Guard Assistance  Extra time soent going over logroll technique    Transfers:  Sit to Stand: [employer]  Stand to Sit:Contact Guard Assistance    Ambulation:  Contact Guard Assistance  Distance: 125ft 12ft  Surface: Level Tile  Device:Cane  Gait Deviations:   Forward Flexed Posture, Slow Jada, Decreased Step Length Bilaterally, Lean to Right, Lean to Left, Decreased Arm Swing, Decreased Gait Speed, Decreased Heel Strike Bilaterally, Wide Base of Support, Mild Path Deviations, Unsteady Gait and cues for canr placement    Balance:  Dynamic Sitting Balance: Supervision  Dynamic Standing Balance: Stand By Assistance  Functional tasks in bathroom with SBA for stability,pt able to complete tasks without A    Exercise:  Patient was guided in 1 set(s) 10 reps of exercise to both lower extremities. Ankle pumps, Glut sets, Quad sets, Heelslides and Hip abduction/adduction. Exercises were completed for increased independence with functional mobility. Functional Outcome Measures: Completed  AM-PAC Inpatient Mobility without Stair Climbing Raw Score : 15  AM-PAC Inpatient without Stair Climbing T-Scale Score : 43.03    ASSESSMENT:  Assessment: Patient progressing toward established goals. Activity Tolerance:  Patient tolerance of  treatment: good. Pt tolerated session well. Pt demos decreased strength, endurance, balance, and independence with mobility. Pt will benefit from con tPT at this time     Equipment Recommendations:Equipment Needed: No  Other: has cane and rollator  Discharge Recommendations:  Subacute skilled nursing facility, TCU    Plan: Times per week: 3-5 X GM  Current Treatment Recommendations: Strengthening, Transfer Training, Patient/Caregiver Education & Training, Balance Training, Gait Training, Functional Mobility Training, Stair training, Equipment Evaluation, Education, & procurement    Patient Education  Patient Education: Plan of Care, [employer] Mobility, Transfers, Gait, Verbal Exercise Instruction    Goals:  Patient goals : to go home  Short term goals  Time Frame for Short term goals: by discharge  Short term goal 1: Pt to transfer supine <--> sit S to enable pt to get in/out of bed. Short term goal 2: Pt to transfer sit <--> stand S for increased functional mobility. Short term goal 3: Pt to ambulate >200 feet with SC SBA for household ambulation. Short term goal 4: Pt to ascend/descend 2 steps with HR SBA for home entry. Long term goals  Time Frame for Long term goals : NA due to short length of stay. Following session, patient left in safe position with all fall risk precautions in place.

## 2020-01-09 NOTE — CARE COORDINATION
1/9/20, 11:27 AM    DISCHARGE ON GOING EVALUATION    Alvaro De Leon day: 6  Location: 8A-09/009-A Reason for admit: Intractable pain [R52]   Procedure: CXR 1/9    Impression:       Slight increase in size of the right pneumothorax. Treatment Plan of Care: Pulmonology consult, repeat CXR in AM, diabetes management, pain control,   Barriers to Discharge: medical stability. PCP: Jesika Duvall MD  Readmission Risk Score: 14%  Patient Goals/Plan/Treatment Preferences: Plan is Leroy Bhatia when medically stable. SW following.

## 2020-01-09 NOTE — ANESTHESIA POSTPROCEDURE EVALUATION
Department of Anesthesiology  Postprocedure Note    Patient: Deb Nogueira  MRN: [id number]  YOB: 1934  Date of evaluation: 1/9/2020  Time:  7:23 AM     Procedure Summary     Date:  01/07/20 Room / Location:  [address] [facility]    Anesthesia Start:  1300 Anesthesia Stop:  7204    Procedure:  KYPHOPLASTY T2 WITH BIOPSY AND POSS OSTEOCOOL (N/A ) Diagnosis:  (CLOSED STABLE FX OF 2ND THORACIC VERTEBRA)    Surgeon:  Raad Bell MD Responsible Provider:  Jacey Green DO    Anesthesia Type:  general ASA Status:  3          Anesthesia Type: general    Derek Phase I: Derek Score: 8    Derek Phase II:      Last vitals: Reviewed and per EMR flowsheets. Anesthesia Post Evaluation    Comments: Gonzalohauselizabeth Mccollum 60  POST-ANESTHESIA NOTE       Name:  Deb Nogueira                                         Age:  80 y.o. MRN:  [id number]      Last Vitals:  BP (!) 165/74   Pulse 68   Temp 97.7 °F (36.5 °C) (Oral)   Resp 18   Ht 5' 3\" (1.6 m)   Wt 172 lb 12.8 oz (78.4 kg)   SpO2 94%   BMI 30.61 kg/m²   Patient Vitals in the past 4 hrs:  01/09/20 0515, BP:(!) 165/74, Temp:97.7 °F (36.5 °C), Temp src:Oral, Pulse:68, Resp:18, SpO2:94 %    Level of Consciousness:  Awake    Respiratory:  Stable    Oxygen Saturation:  Stable    Cardiovascular:  Stable    Hydration:  Adequate    PONV:  Stable    Post-op Pain:  Adequate analgesia    Post-op Assessment:  No apparent anesthetic complications    Additional Follow-Up / Treatment / Comment:  Nany Serrato DO  January 9, 2020   7:24 AM

## 2020-01-09 NOTE — PROGRESS NOTES
negative  System review otherwise negative      PHYSICAL EXAM:  /63   Pulse 85   Temp 97.8 °F (36.6 °C) (Oral)   Resp 16   Ht 5' 3\" (1.6 m)   Wt 172 lb 12.8 oz (78.4 kg)   SpO2 98%   BMI 30.61 kg/m²  I Body mass index is 30.61 kg/m². I   Wt Readings from Last 1 Encounters:   01/06/20 172 lb 12.8 oz (78.4 kg)      awake  Orientation:   person, place, time  Mood: within normal limits  Affect: calm  General appearance: Patient is well nourished, well developed, well groomed and in no acute distress, appearing stated age     Memory:   decreased thought processing   Attention/Concentration: normal  Language:  normal     Cranial Nerves:  cranial nerves II-XII are grossly intact  ROM:  Normal ROM in all 4 extremities, guarded with movement d/t pain in upper back   Motor Exam:  Motor exam is 5 out of 5 all extremities with the exception of 4/5 strength bilateral lower extremities   Tenderness right lower back, no upper back tenderness  Tone:  normal  Muscle bulk: within normal limits  Sensory:  Sensory intact        Heart: normal rate, regular rhythm, normal S1, S2, no murmurs, rubs, clicks or gallops  Lungs: Diminished, clear to auscultation without wheezes or rales. On room air   Abdomen: soft, non-tender, non-distended, normal bowel sounds, no masses or organomegaly     Skin: warm and dry, no rash or erythema. Upper back Kyphoplasty dressing removed, sutures intact. Right chest biopsy site covered with bandaid   Peripheral vascular: Pulses: Normal upper and lower extremity pulses; Edema: trace    DATA    Recent Labs     01/09/20  0509   WBC 6.7   RBC 3.99*   HGB 12.1   HCT 37.7   MCV 94.5   MCH 30.3   MCHC 32.1*      MPV 9.4     Recent Labs     01/09/20  0509      K 4.6      CO2 21*   BUN 20   CREATININE 0.8   GLUCOSE 144*   CALCIUM 8.9     Recent Labs     01/08/20 2029 01/09/20  0741 01/09/20  1208   POCGLU 220* 137* 126*       ASSESSMENT   1.  Acute pathologic compression fracture of T2 with s/p T2 Kyphoplasty from 1/7/2020  2. Intractable back pain   3. Right lung mass   4. History of breast cancer   5. Chronic T7 and T8 compression fracture       PLAN  1. [facility]ticipating [facility]  2. Follow up in outpatient pain clinic on 1/20/2020 for suture removal.   3. Okay to discharge from a pain management perspective. Please call with any needs. Spent 15 minutes evaluating and examining patient and completing documentation      JAYSON Suero Arm, CNP, 1/9/2020, 4:47 PM     I have evaluated the patient and reviewed the case with the nurse practitioner. I agree with the current plan of care including the workup, evaluation, management, and diagnosis. Care plan has been discussed. I agree with above documentation. Patient pain better overall.  To [facility]  Electronically signed by Lavern Seymour MD on 1/9/2020 at 5:13 PM

## 2020-01-10 ENCOUNTER — APPOINTMENT (OUTPATIENT)
Dept: GENERAL RADIOLOGY | Age: 85
DRG: 478 | End: 2020-01-10
Payer: MEDICARE

## 2020-01-10 ENCOUNTER — TELEPHONE (OUTPATIENT)
Dept: PULMONOLOGY | Age: 85
End: 2020-01-10

## 2020-01-10 VITALS
DIASTOLIC BLOOD PRESSURE: 58 MMHG | SYSTOLIC BLOOD PRESSURE: 129 MMHG | HEIGHT: 63 IN | OXYGEN SATURATION: 93 % | BODY MASS INDEX: 30.71 KG/M2 | TEMPERATURE: 97.8 F | HEART RATE: 79 BPM | WEIGHT: 173.3 LBS | RESPIRATION RATE: 18 BRPM

## 2020-01-10 LAB
GLUCOSE BLD-MCNC: 174 MG/DL (ref 70–108)
GLUCOSE BLD-MCNC: 94 MG/DL (ref 70–108)

## 2020-01-10 PROCEDURE — 99239 HOSP IP/OBS DSCHRG MGMT >30: CPT | Performed by: INTERNAL MEDICINE

## 2020-01-10 PROCEDURE — 71046 X-RAY EXAM CHEST 2 VIEWS: CPT

## 2020-01-10 PROCEDURE — 2580000003 HC RX 258: Performed by: FAMILY MEDICINE

## 2020-01-10 PROCEDURE — 6370000000 HC RX 637 (ALT 250 FOR IP): Performed by: PAIN MEDICINE

## 2020-01-10 PROCEDURE — 6370000000 HC RX 637 (ALT 250 FOR IP): Performed by: INTERNAL MEDICINE

## 2020-01-10 PROCEDURE — 94760 N-INVAS EAR/PLS OXIMETRY 1: CPT

## 2020-01-10 PROCEDURE — 6370000000 HC RX 637 (ALT 250 FOR IP): Performed by: FAMILY MEDICINE

## 2020-01-10 PROCEDURE — APPSS30 APP SPLIT SHARED TIME 16-30 MINUTES: Performed by: NURSE PRACTITIONER

## 2020-01-10 PROCEDURE — 82948 REAGENT STRIP/BLOOD GLUCOSE: CPT

## 2020-01-10 PROCEDURE — 2700000000 HC OXYGEN THERAPY PER DAY

## 2020-01-10 PROCEDURE — 99232 SBSQ HOSP IP/OBS MODERATE 35: CPT | Performed by: INTERNAL MEDICINE

## 2020-01-10 RX ORDER — TRAMADOL HYDROCHLORIDE 50 MG/1
50 TABLET ORAL EVERY 12 HOURS PRN
Qty: 6 TABLET | Refills: 0 | Status: SHIPPED | OUTPATIENT
Start: 2020-01-10 | End: 2020-01-13

## 2020-01-10 RX ADMIN — LEVOTHYROXINE SODIUM 88 MCG: 88 TABLET ORAL at 06:04

## 2020-01-10 RX ADMIN — DOXYCYCLINE HYCLATE 50 MG: 100 TABLET, COATED ORAL at 08:08

## 2020-01-10 RX ADMIN — METFORMIN HYDROCHLORIDE 500 MG: 500 TABLET ORAL at 08:08

## 2020-01-10 RX ADMIN — Medication 10 ML: at 08:08

## 2020-01-10 RX ADMIN — RAMIPRIL 2.5 MG: 2.5 CAPSULE ORAL at 08:07

## 2020-01-10 RX ADMIN — ASPIRIN 81 MG 81 MG: 81 TABLET ORAL at 08:07

## 2020-01-10 RX ADMIN — CALCIUM 500 MG: 500 TABLET ORAL at 08:08

## 2020-01-10 RX ADMIN — VENLAFAXINE 75 MG: 37.5 TABLET ORAL at 08:08

## 2020-01-10 RX ADMIN — INSULIN LISPRO 2 UNITS: 100 INJECTION, SOLUTION INTRAVENOUS; SUBCUTANEOUS at 10:03

## 2020-01-10 RX ADMIN — INSULIN LISPRO 5 UNITS: 100 INJECTION, SOLUTION INTRAVENOUS; SUBCUTANEOUS at 10:03

## 2020-01-10 RX ADMIN — INSULIN LISPRO 5 UNITS: 100 INJECTION, SOLUTION INTRAVENOUS; SUBCUTANEOUS at 13:27

## 2020-01-10 RX ADMIN — METOPROLOL SUCCINATE 25 MG: 25 TABLET, FILM COATED, EXTENDED RELEASE ORAL at 08:07

## 2020-01-10 RX ADMIN — DOCUSATE SODIUM 100 MG: 100 CAPSULE, LIQUID FILLED ORAL at 08:07

## 2020-01-10 ASSESSMENT — PAIN DESCRIPTION - LOCATION: LOCATION: BACK

## 2020-01-10 ASSESSMENT — PAIN DESCRIPTION - DESCRIPTORS: DESCRIPTORS: ACHING

## 2020-01-10 ASSESSMENT — PAIN DESCRIPTION - ORIENTATION: ORIENTATION: MID

## 2020-01-10 ASSESSMENT — PAIN DESCRIPTION - PROGRESSION: CLINICAL_PROGRESSION: GRADUALLY WORSENING

## 2020-01-10 ASSESSMENT — PAIN - FUNCTIONAL ASSESSMENT: PAIN_FUNCTIONAL_ASSESSMENT: ACTIVITIES ARE NOT PREVENTED

## 2020-01-10 ASSESSMENT — PAIN DESCRIPTION - PAIN TYPE: TYPE: ACUTE PAIN

## 2020-01-10 ASSESSMENT — PAIN DESCRIPTION - FREQUENCY: FREQUENCY: CONTINUOUS

## 2020-01-10 ASSESSMENT — PAIN SCALES - GENERAL
PAINLEVEL_OUTOF10: 6
PAINLEVEL_OUTOF10: 0

## 2020-01-10 ASSESSMENT — PAIN DESCRIPTION - ONSET: ONSET: ON-GOING

## 2020-01-10 NOTE — CARE COORDINATION
1/10/20, 11:23 AM    Patient goals/plan/ treatment preferences discussed by  and . Patient goals/plan/ treatment preferences reviewed with patient/ family. Patient/ family verbalize understanding of discharge plan and are in agreement with goal/plan/treatment preferences. Understanding was demonstrated using the teach back method. AVS provided by RN at time of discharge, which includes all necessary medical information pertaining to the patients current course of illness, treatment, post-discharge goals of care, and treatment preferences. Services After Discharge  Services At/After Discharge: Aide Services, Nursing Services, Skilled Therapy(Florentino Varela)   IMM Letter  IMM Letter given to Patient/Family/Significant other/Guardian/POA/by[de-identified] staff  IMM Letter date given[de-identified] 01/08/20  IMM Letter time given[de-identified] [id number]       Patient discharged today to [address] [place] [address]. Spoke with patient daughter Adam Whittaker, she will transport patient, will  around 1:30-2:00 today. Left message for Mary at [address] of discharge today and daughter transporting around 1:30-2:00. Will fax AVS and MARS when completed and RN will call report.     2:05 PM  Faxed AVS and MARS

## 2020-01-10 NOTE — PLAN OF CARE
Problem: Falls - Risk of:  Goal: Will remain free from falls  Description  Will remain free from falls  1/5/2020 1613 by Mel Pa RN  Outcome: Ongoing  Note:   Patient free from falls. Bed alarm, chair alarm, call light within reach, non skid footwear on when up. Problem: Falls - Risk of:  Goal: Absence of physical injury  Description  Absence of physical injury  1/5/2020 1613 by Mel Pa RN  Outcome: Ongoing  Note:   Patient free from falls. Bed alarm, chair alarm, call light within reach, non skid footwear on when up. Problem: Risk for Impaired Skin Integrity  Goal: Tissue integrity - skin and mucous membranes  Description  Structural intactness and normal physiological function of skin and  mucous membranes. 1/5/2020 1613 by Mel Pa RN  Outcome: Ongoing  Note:   Mucous membranes moist and intact     Problem: Nutritional:  Goal: Maintenance of adequate nutrition will improve  Description  Maintenance of adequate nutrition will improve  1/5/2020 1613 by Mel Pa RN  Outcome: Ongoing  Note:   Encourage oral intake within dietary order. Problem: Serum Glucose Level - Abnormal:  Goal: Ability to maintain appropriate glucose levels will improve  Description  Ability to maintain appropriate glucose levels will improve  1/5/2020 1613 by Mel Pa RN  Outcome: Ongoing  Note:   Continue to monitor blood glucose and diabetic medications as ordered     Problem: Pain:  Goal: Pain level will decrease  Description  Pain level will decrease  1/5/2020 1613 by Mel Pa RN  Outcome: Ongoing  Note:   Patients pain level is a 0/10. Patients pain goal is no pain. Patient repositioned. Pain goal met at this time. Care plan reviewed with patient. Patient verbalize understanding of the plan of care and contribute to goal setting.
Problem: Falls - Risk of:  Goal: Will remain free from falls  Description  Will remain free from falls  Outcome: Met This Shift  Note:   Assessment & interventions provided throughout shift. Bed locked & in low position, call light in reach, side-rails up x2, non-slip socks on when ambulating, reminded patient to use call light to call for assistance. Bed alarm utilized as patient did sit up on side of bed at 3am, forgetting to use call light. She easily reoriented. Patient uses personal cane for balance. Goal: Absence of physical injury  Description  Absence of physical injury  Outcome: Met This Shift  Note:   None apparent injury tonight     Problem: Risk for Impaired Skin Integrity  Goal: Tissue integrity - skin and mucous membranes  Description  Structural intactness and normal physiological function of skin and  mucous membranes. Outcome: Ongoing  Note:   Patient has been up to the bathroom just about every 2 hours and does turn self in bed. Problem: Nutritional:  Goal: Maintenance of adequate nutrition will improve  Description  Maintenance of adequate nutrition will improve  Outcome: Ongoing  Note:   Other than the ice cream given to help chem last night, patient would not eat anything else although items were offered. Problem: Serum Glucose Level - Abnormal:  Goal: Ability to maintain appropriate glucose levels will improve  Description  Ability to maintain appropriate glucose levels will improve  Outcome: Ongoing  Note:   Chem last night was 80 and patient ate ice cream.     Problem: Pain:  Goal: Pain level will decrease  Description  Pain level will decrease  Outcome: Ongoing  Note:   Lidocaine patch applied to patient's upper back seems to help her pain as well as the scheduled Tylenol at bedtime.
Problem: Falls - Risk of:  Goal: Will remain free from falls  Description  Will remain free from falls  Outcome: Ongoing  Note:   No falls this shift. Call light in reach and bed in lowest position will continue to hourly round and monitor. Problem: Falls - Risk of:  Goal: Absence of physical injury  Description  Absence of physical injury  Outcome: Ongoing  Note:   No falls this shift. Call light in reach and bed in lowest position will continue to hourly round and monitor. Problem: Nutritional:  Goal: Maintenance of adequate nutrition will improve  Description  Maintenance of adequate nutrition will improve  Outcome: Ongoing  Note:   Patient tolerated PO medications and food tolerating well. Problem: Pain:  Goal: Pain level will decrease  Description  Pain level will decrease  Outcome: Ongoing  Note:   Patient is satisfied with pain goal and current pain level.
Patient has had no complaints of pain this shift. Problem: DISCHARGE BARRIERS  Goal: Patient's continuum of care needs are met  Outcome: Ongoing  Note:   Patient plans to discharge to [facility]. Care plan reviewed with patient. Patient verbalizes understanding of the plan of care and contributes to goal setting.
care needs met this shift. Care plan reviewed with patient. Patient verbalize understanding of the plan of care and contribute to goal setting.

## 2020-01-10 NOTE — DISCHARGE SUMMARY
mm x 8 mm. Repeat CXR as outpatient  3. Fall complicated by head trauma and T2 fracture s/p kyphoplasty on 1/7 per Dr. Masha Degroot. Biopsy did not show evidence for malignancy  4. RUL lung mass: Measures 2.3 x 3 cm. S/p lung biopsy on 1/8. Concern for primary or metastatic malignancy. Pathology pending  5. Pancreatic lesion?: Initial concern for pancreatic lesion on CT scan of abdomen/pelvis which showed 2 subcentimeter nodular foci in duct of pancreatic tail,  however MRI showed pancreatic atrophy with moderate ductal dilatation with pancreatic atrophy as well as moderate ductal dilatation. CA 19-9 unremarkable. 6. Uncontrolled DM Type 2: Hgb A1C 10.2. Discharged on  metformin and ISS. Note decrease in dose of metformin to 500 mg bid  7. Recent hx of left breast CA s/p lumpectomy and radiation: Oncology following  8. Urinary incontinence:  Continue to monitor  9. Acquired hypothyroidism: levothyroxine  10. Uncontrolled essential HTN: on ramipril and metoprolol. May need to increase dose of metoprolol, however pneumothorax may be contributory  11. Hyperlipidemia    Controlled Substance Monitoring:    Acute and Chronic Pain Monitoring:   RX Monitoring 1/10/2020   Periodic Controlled Substance Monitoring No signs of potential drug abuse or diversion identified. Discharge Medications      [facility]om[facility]   Home Medication Instructions HQL:[id number]    Printed on:01/10/20 1038   Medication Information                      acetaminophen (TYLENOL) 325 MG tablet  Take 650 mg by mouth as needed              amitriptyline (ELAVIL) 10 MG tablet  Take 25 mg by mouth nightly              aspirin 81 MG tablet  Take 81 mg by mouth daily             calcium carbonate 600 MG TABS tablet  Take 1 tablet by mouth 2 times daily              desonide (DESOWEN) 0.05 % cream  Apply topically 2 times daily Apply topically 2 times daily.              docusate sodium (COLACE, DULCOLAX) 100 MG CAPS  Take 100 mg by mouth 2 times daily             doxycycline hyclate (PERIOSTAT) 20 MG tablet  Take 50 mg by mouth daily              insulin lispro (HUMALOG) 100 UNIT/ML injection vial  Inject 0-12 Units into the skin 3 times daily (with meals)             levothyroxine (SYNTHROID) 125 MCG tablet  Take 88 mcg by mouth Daily              lidocaine 4 % external patch  Place 1 patch onto the skin daily Apply to back             metFORMIN (GLUCOPHAGE) 500 MG tablet  Take 1 tablet by mouth 2 times daily (with meals)             metoprolol succinate (TOPROL XL) 25 MG extended release tablet  Take 25 mg by mouth daily              Multiple Vitamins-Minerals (MULTIVITAMIN PO)  Take by mouth daily             Multiple Vitamins-Minerals (PRESERVISION AREDS PO)  Take 1 capsule by mouth 2 times daily             pravastatin (PRAVACHOL) 20 MG tablet  Take 20 mg by mouth daily             ramipril (ALTACE) 5 MG capsule  Take 2.5 mg by mouth daily              traMADol (ULTRAM) 50 MG tablet  Take 1 tablet by mouth every 12 hours as needed for Pain for up to 3 days. venlafaxine (EFFEXOR) 37.5 MG tablet  Take 75 mg by mouth daily                    Physical Examination     Vitals:  Vitals:    01/09/20 2013 01/10/20 0441 01/10/20 0656 01/10/20 0757   BP: (!) 152/75 (!) 141/78  (!) 146/68   Pulse: 85 70  69   Resp: 18 18  16   Temp: 97.9 °F (36.6 °C) 98.1 °F (36.7 °C)  97.5 °F (36.4 °C)   TempSrc: Oral Oral  Oral   SpO2: 98% 100% 100% 99%   Weight:  173 lb 4.8 oz (78.6 kg)     Height:           Weight: Weight: 173 lb 4.8 oz (78.6 kg)     24 hour intake/output:    Intake/Output Summary (Last 24 hours) at 1/10/2020 1038  Last data filed at 1/9/2020 1925  Gross per 24 hour   Intake 510 ml   Output --   Net 510 ml     General appearance: No apparent distress. HEENT: Extraocular motion intact. Neck: Supple  Respiratory:  CTA bilaterally without rales/wheezes/rhonchi.   Cardiovascular: RRR with normal S1/S2 without murmurs, rubs or gallops. Abdomen: Soft, non-tender, non-distended with normal bowel sounds. Musculoskeletal: Patient is moving extremities x 4 spontaneously. No tenderness to palpation to right anterior or posterior ribs. Tenderness to palpation at upper posterior thoracic region. Arthritic changes in bilateral hands  Neurologic: Grossly non focal. CN: II-XII intact  Psychiatric: Alert and oriented  Vascular: Dorsalis pedis palpable bilaterally. Radial pulses palpable bilaterally. No peripheral edema  Skin:  No visible rashes or lesions. Labs     Labs: For convenience and continuity at follow-up the following most recent labs are provided:      CBC:    Lab Results   Component Value Date    WBC 6.7 01/09/2020    HGB 12.1 01/09/2020    HCT 37.7 01/09/2020     01/09/2020       Renal:    Lab Results   Component Value Date     01/09/2020    K 4.6 01/09/2020    K 4.1 01/03/2020     01/09/2020    CO2 21 01/09/2020    BUN 20 01/09/2020    CREATININE 0.8 01/09/2020    CALCIUM 8.9 01/09/2020           Radiology     Radiology:        Xr Chest Standard (2 Vw)    Result Date: 1/10/2020  PROCEDURE: XR CHEST (2 VW) CLINICAL INFORMATION: To follow Abnormal CXR with right side pneumothorax. . COMPARISON: 1/9/2020 TECHNIQUE: PA and lateral views the chest. FINDINGS: The heart size is normal.  The mediastinum is not widened. There is a small right apical pneumothorax. This has decreased in size when compared to yesterday at the apex previously measuring 16 mm currently measuring 8 mm. Along the lateral margin is even more reduced. No infiltrates. Chronic. Elevated right hemidiaphragm. The pulmonary vascularity is normal. T2 kyphoplasty. Diffuse osteopenia. Improving right apical pneumothorax. **This report has been created using voice recognition software. It may contain minor errors which are inherent in voice recognition technology. ** Final report electronically signed by Dr. Júnior Sanders on 1/10/2020 9:19 AM    Xr Elevated right diaphragm with mild right basilar atelectasis. Minimal mid left lung atelectasis or scarring. No acute pneumonia. **This report has been created using voice recognition software. It may contain minor errors which are inherent in voice recognition technology. ** Final report electronically signed by Dr. Lizette Long on 1/3/2020 9:05 AM    Ct Head Wo Contrast    Result Date: 1/3/2020  PROCEDURE: CT HEAD WO CONTRAST CLINICAL INFORMATION: fall, incontinence. COMPARISON: None TECHNIQUE: Noncontrast 5 mm axial images were obtained through the brain. Sagittal and coronal reconstructions were obtained and reviewed. All CT scans at this facility use dose modulation, iterative reconstruction, and/or weight-based dosing when appropriate to reduce radiation dose to as low as reasonably achievable. FINDINGS: Brain: There is no acute ischemic infarct, hemorrhage, midline shift, mass, or mass effect. There are mild deep white matter hypodensities, nonspecific in nature but probably representing small vessel chronic ischemic changes. There is age appropriate cortical atrophy. Ventricles/basal cisterns: The ventricles and cisterns are of appropriate size and configuration for the patient's age. No evidence of obstructive hydrocephalus. Vascular: There are atherosclerotic calcifications in the bilateral vertebral and cavernous internal carotid arteries. Skull base/calvarium/osseous structures: Unremarkable Soft tissues: Unremarkable Intraorbital contents: Unremarkable Sinuses: Unremarkable; the imaged sinuses are clear without evidence of mucosal thickening or fluid levels. Mastoids: Unremarkable; the mastoid air cells are clear. No acute ischemic infarct, hemorrhage, or mass effect. Aging changes as discussed above. **This report has been created using voice recognition software. It may contain minor errors which are inherent in voice recognition technology. ** Final report electronically signed by Dr. Francisco Javier Boateng Erick on 1/3/2020 10:22 AM    Ct Chest W Contrast    Result Date: 1/3/2020  PROCEDURE: CT CHEST W CONTRAST CLINICAL INFORMATION: fall. COMPARISON: Chest x-ray earlier today TECHNIQUE: 5 mm axial images were obtained through the chest after the administration of intravenous contrast. Sagittal and coronal reconstructions were obtained. All CT scans at this facility use dose modulation, iterative reconstruction, and/or weight-based dosing when appropriate to reduce radiation dose to as low as reasonably achievable. FINDINGS: Lungs: There is a lobulated 2.3 x 3 cm lateral right upper lobe pulmonary mass consistent with primary lung carcinoma versus a metastasis; there is an additional 8 x 7 mm left upper lobe pulmonary nodule (axial image 26). There is also a subtle 4 x 3 mm noncalcified nodule in the lateral right middle lobe (axial image 31). There are a few scattered calcified granulomas. There is bilateral multilobar atelectasis. There is no pneumonia. The trachea and major bronchi are unremarkable. Pleura: No pleural effusion. No pneumothorax. Diaphragm: There is moderate elevation of the right hemidiaphragm. Heart: Heart size is normal. There is no pericardial effusion. Isamar and mediastinum: There is no mass or adenopathy. There are calcified mediastinal and right hilar lymph nodes. There is a small hiatal hernia. Pulmonary vasculature: Unremarkable Thoracic aorta/vascular: No thoracic aortic aneurysm or dissection. The imaged portions of the brachiocephalic arteries are unremarkable. Imaged upper abdomen: See the accompanying abdomen pelvis CT report. Musculoskeletal system: There is multilevel cervical and thoracic degenerative disc disease and endplate spondylosis. There is a mild to moderate compression fracture of T2 of indeterminate age however there is an acute minimally displaced fracture of the anterior aspect of the vertebral body. This is better seen on the accompanying cervical spine CT.  There is associated mild prevertebral edema or hemorrhage at this level. . There is an old mild compression fracture of T7. There is mild thoracic levoscoliosis. Chest/body wall soft tissues: There are postoperative changes of the left breast with multiple surgical clips and an adjacent 1.9 x 1.3 cm scar in the medial left breast. Thyroid: There is a 1.5 x 1.3 cm right thyroid nodule. Consider nonemergent ultrasound for further evaluation. Acute minimally displaced fracture of the anterior T2 vertebral body with an age indeterminate mild to moderate compression fracture component. Associated mild prevertebral edema or hemorrhage. 2.3 x 3 cm right upper lobe pulmonary mass consistent with primary lung carcinoma versus metastasis. 8 x 7 mm left upper lobe pulmonary nodule. No acute traumatic abnormality of the chest. No acute pneumonia. Bilateral multilobar atelectasis. 1.5 x 1.3 cm right thyroid nodule, consider nonemergent ultrasound for further evaluation. Postoperative changes of the left breast. Results discussed with Dr. Tom Cantu on 1/3/2020 at 10:40 AM. **This report has been created using voice recognition software. It may contain minor errors which are inherent in voice recognition technology. ** Final report electronically signed by Dr. Alis Rincon on 1/3/2020 10:43 AM    Ct Cervical Spine Wo Contrast    Result Date: 1/3/2020  PROCEDURE: CT CERVICAL SPINE WO CONTRAST CLINICAL INFORMATION: trauma . COMPARISON: No prior study. TECHNIQUE: 2-D multiplanar noncontrast images of the cervical spine. Bone windows only. All CT scans at this facility use dose modulation, iterative reconstruction, and/or weight-based dosing when appropriate to reduce radiation dose to as low as reasonably achievable. FINDINGS: There is an acute anterior wedge compression fracture of T2. There is no retropulsion or central canal encroachment. There is a approximately 25% loss of vertebral body height.  There is periureteral vertebral hematoma. There is no acute cervical spine fracture. There is disc space narrowing and degenerative change at every level is most severe at C5-6 and C6-7. Hypertrophic facet changes are present throughout and are greater on the left and the right. There is marked right neural foraminal narrowing at C5-6. No precervical soft tissue swelling. Inhomogeneous appearance of the thyroid with enlargement of the right thyroid lobe suggesting goiter. Case discussed with Dr. Abelardo Carrizales at the time of exam.     Acute anterior wedge compression fracture of T2 with no central canal encroachment. **This report has been created using voice recognition software. It may contain minor errors which are inherent in voice recognition technology. ** Final report electronically signed by Dr. Júnior Sanders on 1/3/2020 10:23 AM    Ct Guided Needle Placement    Result Date: 1/8/2020  PROCEDURE: CT NEEDLE BIOPSY LUNG PERCUTANEOUS, CT GUIDED NEEDLE PLACEMENT CLINICAL INFORMATION: Breast cancer has been in surveillance. Now with lung mass seen on CT chest . COMPARISON: No prior study. TECHNIQUE: Written informed consent was obtained with the procedure explained to the patient and the patient's daughters. The patient was assessed and transferred to CT. The patient was monitored EKG and pulse oximetry monitoring devices by the radiology RN. Procedure duration: 30 minutes MEDICATIONS: Versed 1 mg IV Fentanyl 25 mcg IV. Lidocaine 2% 9 mL. Patient was scanned in the supine position with radiopaque markers in place. Level for access was chosen using an anterior approach. The anterior chest was prepped and draped in sterile fashion. A 19-gauge Lam needle was advanced to the right upper lung mass. After confirmation of position  a 20-gauge sample was obtained. Initial sample was not satisfactory. Second sample was obtained which demonstrated abnormal cells. 3 more samples were then obtained and the needle removed.  The patient tolerated this procedure stenosis in the thoracic spine. There is moderate severity spinal canal stenosis in the cervical spine at the C6-7 level due to degenerative changes. There is a right-sided lung mass. Please see the patient's dedicated chest CT. 1. Acute compression fracture of T2. There is 30% height loss. There is abnormal marrow signal in the vertebral body. This is indeterminate. There is no associated soft tissue mass or epidural component. This may be related to underlying osteoporosis. Metastatic disease cannot be excluded. 2. No other sites of abnormal marrow signal are noted. 3. Degenerative changes throughout the thoracic spine without spinal canal stenosis. 4. Right lung mass. **This report has been created using voice recognition software. It may contain minor errors which are inherent in voice recognition technology. ** Final report electronically signed by Dr. Mary Kate Valenzuela on 1/6/2020 2:59 PM    Ct Abdomen Pelvis W Iv Contrast Additional Contrast? None    Result Date: 1/3/2020  PROCEDURE: CT ABDOMEN PELVIS W IV CONTRAST CLINICAL INFORMATION: fall, R lateral rib pain, urinary incontinence, please recon spine . COMPARISON: None. TECHNIQUE: 5 mm axial CT images were obtained through the abdomen and pelvis after the administration of intravenous contrast. Coronal and sagittal reconstructions were obtained. All CT scans at this facility use dose modulation, iterative reconstruction, and/or weight-based dosing when appropriate to reduce radiation dose to as low as reasonably achievable. FINDINGS: Lower chest: See the accompanying chest CT report. Liver: There are scattered calcified granulomas. There is a 4 mm round hypodense lesion in the anterior right lobe of the liver, too small to characterize but possibly a cyst.. There is no liver mass or intrahepatic biliary dilatation. Gallbladder/Biliary tree: Unremarkable. No calcified gallstones. No extrahepatic biliary dilatation. Spleen: There are calcified granulomas.  No likely degenerative in nature. No acute traumatic abnormality of the solid or hollow viscera of the abdomen and pelvis. No acute inflammatory or infectious process in the abdomen or pelvis. Colonic diverticulosis without diverticulitis. Severe pancreatic atrophy with marked diffuse pancreatic ductal dilatation. 2 subcentimeter nodular foci in the duct in the pancreatic tail. Neoplasm not excluded. Additional nonemergent findings as detailed above. **This report has been created using voice recognition software. It may contain minor errors which are inherent in voice recognition technology. ** Final report electronically signed by Dr. Orquidea Hahn on 1/3/2020 10:55 AM    Xr Chest 1 Vw    Result Date: 1/9/2020  PROCEDURE: XR CHEST 1 VIEW CLINICAL INFORMATION: follow up pneumothorax hx of lung biopsy 1/8/2020 . COMPARISON: 1/9/2020 at 8:00 AM. TECHNIQUE: Single upright PA chest FINDINGS: There is persistent right apical pneumothorax. Measured at the apex this is not significantly changed from the previous exam. There are no other changes from the prior exam.     No significant change in the right apical pneumothorax which is less than 10%. Continued follow-up is recommended. **This report has been created using voice recognition software. It may contain minor errors which are inherent in voice recognition technology. ** Final report electronically signed by Dr. Adarsh Villa on 1/9/2020 1:39 PM    Mri Abdomen W Wo Contrast Mrcp    Result Date: 1/4/2020  PROCEDURE: MRI ABDOMEN W WO CONTRAST MRCP CLINICAL INFORMATION: possible pancreatic mass & duct on CT history of breast cancer COMPARISON: CT 1/3/2020 TECHNIQUE: Routine MRI abdomen without and with IV contrast.  Routine MRCP was also performed. The MRCP examination includes 3D reconstructions of the biliary tree and the pancreatic duct. CONTRAST: 10 cc ProHance FINDINGS: LIVER: The liver is normal size.  The hepatic parenchymal signal is normal. 4 mm probable cyst near the anterior branch of the right portal vein. There is no intrahepatic biliary dilatation. There is normal enhancement of the portal and the hepatic veins. There is normal enhancement of the portal venous confluence, SMV, and splenic vein. GALLBLADDER: The gallbladder is filled with multiple small stones. No wall thickening or pericholecystic fluid. BILIARY TREE: The common duct is normal size measuring 4 mm There is no gross choledocholithiasis. PANCREAS: The pancreas is markedly atrophied. The pancreatic duct is moderately prominent measuring up to 8 mm particularly within the tail of the pancreas. No gross mass is suggested. There is no peripancreatic fluid or inflammation. SPLEEN:  No mass or enlargement. ADRENAL GLANDS: No mass or enlargement. KIDNEYS: No mass or obstruction. BOWEL: Nonobstructive. FREE AIR/FREE FLUID/INFLAMMATION: None. LYMPHADENOPATHY: There are no pathologically enlarged lymph nodes. ABDOMINAL AORTA/IVC: Unremarkable. LUNG BASES: Unremarkable. MUSCULOSKELETAL: Unremarkable. OTHER: None. A gross pancreatic mass is not seen. There is pancreatic atrophy as well as moderate ductal dilatation. Multiple tiny gallstones are noted **This report has been created using voice recognition software. It may contain minor errors which are inherent in voice recognition technology. ** Final report electronically signed by Dr. Alex Almendarez on 1/4/2020 2:00 PM    Ct Needle Biopsy Lung/mediastinum Percutaneous    Result Date: 1/8/2020  PROCEDURE: CT NEEDLE BIOPSY LUNG PERCUTANEOUS, CT GUIDED NEEDLE PLACEMENT CLINICAL INFORMATION: Breast cancer has been in surveillance. Now with lung mass seen on CT chest . COMPARISON: No prior study. TECHNIQUE: Written informed consent was obtained with the procedure explained to the patient and the patient's daughters. The patient was assessed and transferred to CT. The patient was monitored EKG and pulse oximetry monitoring devices by the radiology RN.  Procedure duration: 30 minutes MEDICATIONS: Versed 1 mg IV Fentanyl 25 mcg IV. Lidocaine 2% 9 mL. Patient was scanned in the supine position with radiopaque markers in place. Level for access was chosen using an anterior approach. The anterior chest was prepped and draped in sterile fashion. A 19-gauge Lam needle was advanced to the right upper lung mass. After confirmation of position  a 20-gauge sample was obtained. Initial sample was not satisfactory. Second sample was obtained which demonstrated abnormal cells. 3 more samples were then obtained and the needle removed. The patient tolerated this procedure well. All CT scans at this facility use dose modulation, iterative reconstruction, and/or weight-based dosing when appropriate to reduce radiation dose to as low as reasonably achievable. FINDINGS: Moderate. The tumoral hemorrhage. No pneumothorax. Adequate samples according to pathology. Uncomplicated CT-guided biopsy right upper lung mass. No immediate complications. **This report has been created using voice recognition software. It may contain minor errors which are inherent in voice recognition technology. ** Final report electronically signed by Dr. Salvador Azul on 1/8/2020 10:12 AM    Fluoro For Surgical Procedures    Result Date: 1/7/2020  Radiology exam is complete. No Radiologist dictation. Please follow up with ordering provider. Ct Lumbar Reconstruction Wo Post Process    Result Date: 1/3/2020  PROCEDURE: CT LUMBAR RECONSTRUCTION WO POST PROCESS CLINICAL INFORMATION: pain. Rib and back pain. COMPARISON: Lumbar spine radiographs dated 9/19/2016. TECHNIQUE: Axial, sagittal and coronal reformatted images of the lumbar spine reconstructed from the patient's abdomen and pelvis CT. IV contrast is present. All CT scans at this facility use dose modulation, iterative reconstruction, and/or weight-based dosing when appropriate to reduce radiation dose to as low as reasonably achievable.  FINDINGS: There is appearing mild compression deformity of the T7 vertebral body with approximately 20% anterior height loss. 3. Mild degenerative changes of the thoracic spine without significant spinal canal or neuroforaminal stenosis at any thoracic level. **This report has been created using voice recognition software. It may contain minor errors which are inherent in voice recognition technology. ** Final report electronically signed by Dr. Vance Engle MD on 1/3/2020 3:43 PM    Xr Chest Pa Inspiration 1 Vw    Result Date: 1/8/2020  PROCEDURE: XR CHEST PA INSPIRATION 1 VW CLINICAL INFORMATION: Right lung biopsy, evaluate for pneumothorax . COMPARISON: 1/8/2020 at 11:46 AM TECHNIQUE: Upright PA chest. FINDINGS: There has been interval development of a small right apical pneumothorax. This is no greater than 5%. There is no midline shift or mass effect. There is improved aeration around the right upper lobe nodule. There are no other changes from earlier. Approximately 5% right apical pneumothorax. **This report has been created using voice recognition software. It may contain minor errors which are inherent in voice recognition technology. ** Final report electronically signed by Dr. Blake Jaimes on 1/8/2020 2:02 PM    Xr Chest Pa Inspiration 1 Vw    Result Date: 1/8/2020  PROCEDURE: XR CHEST PA INSPIRATION 1 VW CLINICAL INFORMATION: lung biopsy . COMPARISON: 1/3/2020 TECHNIQUE: PA upright chest. FINDINGS: There is no pneumothorax identified. Heart size normal. No mediastinal widening. Right upper lobe mass with additional haziness around the nodule secondary to recent biopsy. No pneumothorax status post right lung biopsy **This report has been created using voice recognition software. It may contain minor errors which are inherent in voice recognition technology. ** Final report electronically signed by Dr. Blake Jaimes on 1/8/2020 2:00 PM      Consults     IP CONSULT TO ONCOLOGY  IP CONSULT TO GI  PALLIATIVE CARE

## 2020-01-10 NOTE — PROGRESS NOTES
Spoke with pathology regarding lung biopsy results. They state results should be in this afternoon but could potentially take until Monday.

## 2020-01-10 NOTE — PROGRESS NOTES
[facility]      Patient - Nabor Almeida   MRN -  [id number]   [facility] # - [de-identified]   - 1934      Date of Admission -  1/3/2020  7:51 AM  Date of evaluation -  1/10/2020  Room - 8A--A   Hospital Day - [address], [place] Primary Care Physician - Justyna Chaudhry MD     Problem List      Active Hospital Problems    Diagnosis Date Noted    Pneumothorax of right lung after biopsy [J95.811]     Fall [W19. XXXA]     Head trauma [S09.90XA]     Closed fracture of second thoracic vertebra ([address] [S22.029A]     Mass of left lung [R91.8]     Uncontrolled type 2 diabetes mellitus with hyperglycemia ([address] [E11.65]     History of breast cancer [Z85.3]     Urinary incontinence [R32]     Hyperlipidemia [E78.5]     Pathologic compression fracture of thoracic vertebra ([address] [E57.66FV]     Malignant neoplasm of upper lobe of lung ([address] [C34.10]     Compression fracture of T2 vertebra ([address] [S22.020A]     Intractable pain [R52] 2020    Uncontrolled hypertension [I10] 2016     Reason for Consult    For management of right side Pneumothorax. HPI   History Obtained From: Patient and electronic medical record. Nabor Almeida is a 80 y.o. female  was initially admitted under hospitalist service. Pulmonary medicine was consulted for further management of Pneumothorax on right side noticed after having a CT guided biopsy of right lung mass. The patient is a 80 y.o. 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    She was admitted on 1/3/20 under hospitalist service for evaluation of pain and fall taken place on 19.  During her work up she was found to have a lobulated 2.3 x 3 cm lateral right upper lobe pulmonary mass consistent with primary lung carcinoma versus a 0-6 Units Subcutaneous Nightly    insulin lispro  5 Units Subcutaneous TID     lidocaine  1 patch Transdermal Daily    metFORMIN  500 mg Oral BID     levothyroxine  88 mcg Oral Daily    amitriptyline  25 mg Oral Nightly    pravastatin  20 mg Oral Nightly    doxycycline hyclate  50 mg Oral Daily    calcium elemental  500 mg Oral Daily    docusate sodium  100 mg Oral BID    venlafaxine  75 mg Oral Daily    ramipril  2.5 mg Oral Daily    metoprolol succinate  25 mg Oral Daily    aspirin  81 mg Oral Daily    acetaminophen  650 mg Oral Nightly    sodium chloride flush  10 mL Intravenous 2 times per day     midazolam, acetaminophen, traMADol, sodium chloride flush, magnesium hydroxide, ondansetron, glucose, glucagon (rDNA)  IV Drips/Infusions    Home Medications  Medications Prior to Admission: metoprolol succinate (TOPROL XL) 25 MG extended release tablet, Take 25 mg by mouth daily   desonide (DESOWEN) 0.05 % cream, Apply topically 2 times daily Apply topically 2 times daily.   acetaminophen (TYLENOL) 325 MG tablet, Take 650 mg by mouth as needed   ramipril (ALTACE) 5 MG capsule, Take 2.5 mg by mouth daily   venlafaxine (EFFEXOR) 37.5 MG tablet, Take 75 mg by mouth daily   Multiple Vitamins-Minerals (MULTIVITAMIN PO), Take by mouth daily  Multiple Vitamins-Minerals (PRESERVISION AREDS PO), Take 1 capsule by mouth 2 times daily  levothyroxine (SYNTHROID) 125 MCG tablet, Take 88 mcg by mouth Daily   amitriptyline (ELAVIL) 10 MG tablet, Take 25 mg by mouth nightly   pravastatin (PRAVACHOL) 20 MG tablet, Take 20 mg by mouth daily  doxycycline hyclate (PERIOSTAT) 20 MG tablet, Take 50 mg by mouth daily   calcium carbonate 600 MG TABS tablet, Take 1 tablet by mouth 2 times daily   aspirin 81 MG tablet, Take 81 mg by mouth daily  [DISCONTINUED] docusate sodium (COLACE) 100 MG capsule, Take 100 mg by mouth daily   [DISCONTINUED] metFORMIN (GLUCOPHAGE) 500 MG tablet, Take 1,000 mg by mouth 2 times daily (with meals)   Diet    DIET GENERAL; Low Sodium (2 GM)  Allergies    Patient has no known allergies. Family History          Problem Relation Age of Onset    Cancer Father         brain    Cancer Paternal Grandfather         stomach     Sleep History    Never diagnosed with sleep apnea in the past    Social History     Social History     Socioeconomic History    Marital status:      Spouse name: Not on file    Number of children: Not on file    Years of education: Not on file    Highest education level: Not on file   Occupational History    Not on file   Social Needs    Financial resource strain: Not on file    Food insecurity:     Worry: Not on file     Inability: Not on file    Transportation needs:     Medical: Not on file     Non-medical: Not on file   Tobacco Use    Smoking status: Never Smoker    Smokeless tobacco: Never Used   Substance and Sexual Activity    Alcohol use: No    Drug use: No    Sexual activity: Not on file   Lifestyle    Physical activity:     Days per week: Not on file     Minutes per session: Not on file    Stress: Not on file   Relationships    Social connections:     Talks on phone: Not on file     Gets together: Not on file     Attends Synagogue service: Not on file     Active member of club or organization: Not on file     Attends meetings of clubs or organizations: Not on file     Relationship status: Not on file    Intimate partner violence:     Fear of current or ex partner: Not on file     Emotionally abused: Not on file     Physically abused: Not on file     Forced sexual activity: Not on file   Other Topics Concern    Not on file   Social History Narrative    Not on file         Vitals     height is 5' 3\" (1.6 m) and weight is 173 lb 4.8 oz (78.6 kg). Her oral temperature is 97.5 °F (36.4 °C). Her blood pressure is 146/68 (abnormal) and her pulse is 69. Her respiration is 16 and oxygen saturation is 99%. Body mass index is 30.7 kg/m².     SUPPLEMENTAL O2: O2 Flow Rate (L/min): 10 L/min       I/O        Intake/Output Summary (Last 24 hours) at 1/10/2020 1120  Last data filed at 1/9/2020 1925  Gross per 24 hour   Intake 510 ml   Output --   Net 510 ml     I/O last 3 completed shifts: In: 510 [P.O.:510]  Out: -    Patient Vitals for the past 96 hrs (Last 3 readings):   Weight   01/10/20 0441 173 lb 4.8 oz (78.6 kg)       Exam   Physical Exam   Constitutional: No distress on O2 per NRB. Patient appears moderately built and  moderately nourished. Head: Normocephalic and atraumatic. Mouth/Throat: Oropharynx is clear and moist.  No oral thrush. Eyes: Conjunctivae are normal. Pupils are equal, round. No scleral icterus. Neck: Neck supple. No tracheal deviation present. Cardiovascular: S1 and S2 with no murmur. No peripheral edema  Pulmonary/Chest: Normal effort with bilateral air entry, clear breath sounds. No stridor. No respiratory distress. Patient exhibits no tenderness. Abdominal: Soft. Bowel sounds audible. No distension or tenderness to palp. Musculoskeletal: Moves all extremities;no clubbing/cyanosis  Neurological: Patient is alert and oriented to person, place, and time. Skin: Warm and dry. No bruising or bleeding     Labs  - Old records and notes have been reviewed in [facility]   ABG  No results found for: PH, PO2, PCO2, HCO3, O2SAT  No results found for: IFIO2, MODE, SETTIDVOL, SETPEEP  CBC  Recent Labs     01/09/20  0509   WBC 6.7   RBC 3.99*   HGB 12.1   HCT 37.7   MCV 94.5   MCH 30.3   MCHC 32.1*      MPV 9.4      BMP  Recent Labs     01/09/20  0509      K 4.6      CO2 21*   BUN 20   CREATININE 0.8   GLUCOSE 144*   MG 1.8   CALCIUM 8.9     LFT  No results for input(s): AST, ALT, ALB, BILITOT, ALKPHOS, LIPASE in the last 72 hours. Invalid input(s): AMYLASE  TROP  No results found for: TROPONINT  BNP  No results for input(s): BNP in the last 72 hours. Lactic Acid  No results for input(s): LACTA in the last 72 hours.   INR  No results for input(s): INR, PROTIME in the last 72 hours. PTT  No results for input(s): APTT in the last 72 hours. Glucose  Recent Labs     01/09/20  1759 01/09/20  1957 01/10/20  0859   POCGLU 84 249* 174*     UA No results for input(s): SPECGRAV, PHUR, COLORU, CLARITYU, MUCUS, PROTEINU, BLOODU, RBCUA, WBCUA, BACTERIA, NITRU, GLUCOSEU, BILIRUBINUR, UROBILINOGEN, KETUA, LABCAST, LABCASTTY, AMORPHOS in the last 72 hours. Invalid input(s): CRYSTALS. PFTs   None in Epic. Sleep studies   None in Epic. Cultures    Specimen:  SOFT TISSUE, T2 VERTEBRAL BODY   FINAL DIAGNOSIS:  T2 vertebral body, needle core biopsy:   Bone with evidence of remodeling and marrow fibrosis. Microscopic Examination:  The specimen consists of a needle core biopsy of bone with evidence of  bony remodeling and marrow fibrosis. There is scant hematopoiesis  seen. Malignancy is not identified. Clinical correlation is  recommended. EKG     Echocardiogram   None in Epic. Radiology    CXR  1/10/2020   XR CHEST (2 VW)   Improving right apical pneumothorax. Chest Xray:  Jan 9, 2020   PROCEDURE: XR CHEST 1 VIEW     No significant change in the right apical pneumothorax which is less than 10%. Continued follow-up is recommended. CTA of chest Scans  (See actual reports for details)  Darwin 3, 2020   PROCEDURE: CT CHEST W CONTRAST   Acute minimally displaced fracture of the anterior T2 vertebral body with an age indeterminate mild to moderate compression fracture component. Associated mild prevertebral edema or hemorrhage. 2.3 x 3 cm right upper lobe pulmonary mass consistent with primary lung carcinoma versus metastasis. 8 x 7 mm left upper lobe pulmonary nodule. No acute traumatic abnormality of the chest. No acute pneumonia. Bilateral multilobar atelectasis. 1.5 x 1.3 cm right thyroid nodule, consider nonemergent ultrasound for further evaluation.  Postoperative changes of the left breast.         CT needle biopsy:  Jan

## 2020-01-10 NOTE — TELEPHONE ENCOUNTER
Anupam Espinoza from [address] [place] [address] called to schedule Damian Ruiz for a 1 wk follow up with Nic Vigil. She saw Enrrique Sotelo in the hospital but she wants him to follow with Nic Vigil in the office. She was discharged from [facility] on 1/10/20 to [facility] for therapy. She had a lung biopsy and develooped a pneumothorax afterwards. Nic Vigil has no appointments in the next week. Please advise scheduling for Damian Ruiz with Florentino at [phone].

## 2020-01-15 ENCOUNTER — OFFICE VISIT (OUTPATIENT)
Dept: PULMONOLOGY | Age: 85
End: 2020-01-15
Payer: MEDICARE

## 2020-01-15 VITALS
OXYGEN SATURATION: 98 % | WEIGHT: 174.4 LBS | BODY MASS INDEX: 30.9 KG/M2 | HEIGHT: 63 IN | HEART RATE: 85 BPM | SYSTOLIC BLOOD PRESSURE: 104 MMHG | TEMPERATURE: 99.8 F | DIASTOLIC BLOOD PRESSURE: 66 MMHG

## 2020-01-15 PROCEDURE — 4040F PNEUMOC VAC/ADMIN/RCVD: CPT | Performed by: NURSE PRACTITIONER

## 2020-01-15 PROCEDURE — G8427 DOCREV CUR MEDS BY ELIG CLIN: HCPCS | Performed by: NURSE PRACTITIONER

## 2020-01-15 PROCEDURE — 1123F ACP DISCUSS/DSCN MKR DOCD: CPT | Performed by: NURSE PRACTITIONER

## 2020-01-15 PROCEDURE — 1090F PRES/ABSN URINE INCON ASSESS: CPT | Performed by: NURSE PRACTITIONER

## 2020-01-15 PROCEDURE — 1036F TOBACCO NON-USER: CPT | Performed by: NURSE PRACTITIONER

## 2020-01-15 PROCEDURE — G8400 PT W/DXA NO RESULTS DOC: HCPCS | Performed by: NURSE PRACTITIONER

## 2020-01-15 PROCEDURE — 1111F DSCHRG MED/CURRENT MED MERGE: CPT | Performed by: NURSE PRACTITIONER

## 2020-01-15 PROCEDURE — G8484 FLU IMMUNIZE NO ADMIN: HCPCS | Performed by: NURSE PRACTITIONER

## 2020-01-15 PROCEDURE — G8417 CALC BMI ABV UP PARAM F/U: HCPCS | Performed by: NURSE PRACTITIONER

## 2020-01-15 PROCEDURE — 99214 OFFICE O/P EST MOD 30 MIN: CPT | Performed by: NURSE PRACTITIONER

## 2020-01-15 ASSESSMENT — ENCOUNTER SYMPTOMS
WHEEZING: 0
CHEST TIGHTNESS: 0
STRIDOR: 0
DIARRHEA: 0
NAUSEA: 0
BACK PAIN: 1
SHORTNESS OF BREATH: 0
VOMITING: 0
COUGH: 0

## 2020-01-15 NOTE — PROGRESS NOTES
[facility]    Patient: Bel Scott, 80 y.o.   : 1934  1/15/2020    Pt of Dr. Lakesha Chung   Patient presents with   [address] from Hospital     Follow up from [facility] 01/10/2020        HPI  Damian Ruiz is here for follow up from recent hospitalization at [facility]. Patient presented to [facility] on 1/3/2020 after she sustained a fall at a [employer] party on 19 and hit her head on a patio door. Since the fall she had increasing back and rib pain in the following days. She had no LOC and fall was witnessed. During ER evaluation on 1/3/2020 Patient was found to have abnormal CT imaging during showing pancreatic atrophy with ductal dilation, T2 anterior vertebral body fracture that was minimally displaced left upper lobe lung nodule and right upper lobe lung nodule. Patient PMH is significant for cancer of left breast s/p lumpectomy in 2016 currently following with Dr. Ronald Virk of Oncology, DM, HTN. During hospitalization patient underwent kyphoplasty on 2020 and biopsy of Right upper lobe lung mass on 2020 by Oncology and Pain service that was complicated by Right apical pneumothorax. Pulmonary service was consulted on 2020 to manage post biopsy pneumothorax. Patient was treated with 100% NRB and Right pneumothorax improved on CXR 1/10/2020 patient was in NAD, remained on RA and she was discharged to [facility]. Patient presents today with repeat CXR to follow pneumothorax. Overall she reports no respiratory symptoms ie shortness of breath, cough, chest pain, wheezing, or hemoptysis. She reports her physical activity is limited by pain in her joints.      Nonsmoker  On no supplemental O2    No history of chronic lung disease uses no respiratory medications    Past Medical hx   PMH:  Past Medical History:   Diagnosis Date    Arthritis     Cancer ([address] 2016    LEFT BREAST    Depression     [facility]     Hyperlipidemia  Hypertension     Hypothyroidism     PONV (postoperative nausea and vomiting)     Spinal stenosis      SURGICAL HISTORY:  Past Surgical History:   Procedure Laterality Date    BREAST SURGERY Left     lumpectomy    EYE SURGERY  2006    cataracts    HYSTERECTOMY  1980    KYPHOSIS SURGERY N/A 1/7/2020    KYPHOPLASTY T2 WITH BIOPSY AND POSS OSTEOCOOL performed by Roberto Hutchins MD at [address] Left 11/29/2016    Re-excision deep margins left breast    TONSILLECTOMY      as a child     SOCIAL HISTORY:   Social History     Tobacco Use    Smoking status: Never Smoker    Smokeless tobacco: Never Used   Substance Use Topics    Alcohol use: No    Drug use: No     ALLERGIES:No Known Allergies  FAMILY HISTORY:  Family History   Problem Relation Age of Onset    Cancer Father         brain    Cancer Paternal Grandfather         stomach     CURRENT MEDICATIONS:  Current Outpatient Medications   Medication Sig Dispense Refill    docusate sodium (COLACE, DULCOLAX) 100 MG CAPS Take 100 mg by mouth 2 times daily 60 capsule 0    lidocaine 4 % external patch Place 1 patch onto the skin daily Apply to back 30 patch 0    insulin lispro (HUMALOG) 100 UNIT/ML injection vial Inject 0-12 Units into the skin 3 times daily (with meals) 1 vial 3    metFORMIN (GLUCOPHAGE) 500 MG tablet Take 1 tablet by mouth 2 times daily (with meals) 60 tablet 3    metoprolol succinate (TOPROL XL) 25 MG extended release tablet Take 25 mg by mouth daily       desonide (DESOWEN) 0.05 % cream Apply topically 2 times daily Apply topically 2 times daily.       acetaminophen (TYLENOL) 325 MG tablet Take 650 mg by mouth as needed       ramipril (ALTACE) 5 MG capsule Take 2.5 mg by mouth daily       venlafaxine (EFFEXOR) 37.5 MG tablet Take 75 mg by mouth daily       Multiple Vitamins-Minerals (MULTIVITAMIN PO) Take by mouth daily      Multiple Vitamins-Minerals (PRESERVISION AREDS PO) Take 1 capsule by mouth 2 times daily      levothyroxine (SYNTHROID) 125 MCG tablet Take 100 mcg by mouth Daily       amitriptyline (ELAVIL) 10 MG tablet Take 25 mg by mouth nightly       pravastatin (PRAVACHOL) 20 MG tablet Take 20 mg by mouth daily      doxycycline hyclate (PERIOSTAT) 20 MG tablet Take 50 mg by mouth daily       calcium carbonate 600 MG TABS tablet Take 1 tablet by mouth 2 times daily       aspirin 81 MG tablet Take 81 mg by mouth daily       No current facility-administered medications for this visit. Aleksandra BROOKS   Review of Systems   Constitutional: Negative for chills, fever and unexpected weight change. Respiratory: Negative for cough, chest tightness, shortness of breath, wheezing and stridor. Cardiovascular: Negative for chest pain and leg swelling. Gastrointestinal: Negative for diarrhea, nausea and vomiting. Genitourinary: Negative for dysuria. Musculoskeletal: Positive for arthralgias and back pain. Physical exam   /66 (Site: Right Upper Arm, Position: Sitting, Cuff Size: Medium Adult)   Pulse 85   Temp 99.8 °F (37.7 °C)   Ht 5' 3\" (1.6 m)   Wt 174 lb 6.4 oz (79.1 kg)   SpO2 98% Comment: on room air at rest  BMI 30.89 kg/m²    Wt Readings from Last 3 Encounters:   01/15/20 174 lb 6.4 oz (79.1 kg)   01/10/20 173 lb 4.8 oz (78.6 kg)   10/10/19 176 lb 9.4 oz (80.1 kg)       Physical Exam  Vitals signs and nursing note reviewed. Constitutional:       General: She is not in acute distress. Appearance: She is well-developed. HENT:      Head: Normocephalic and atraumatic. Neck:      Musculoskeletal: Neck supple. Trachea: No tracheal deviation. Cardiovascular:      Rate and Rhythm: Normal rate and regular rhythm. Heart sounds: Normal heart sounds. No murmur. Pulmonary:      Effort: Pulmonary effort is normal. No respiratory distress. Breath sounds: Normal breath sounds. No stridor. No wheezing or rales. Chest:      Chest wall: No tenderness.    Abdominal: General: Bowel sounds are normal. There is no distension. Palpations: Abdomen is soft. Skin:     General: Skin is warm and dry. Capillary Refill: Capillary refill takes less than 2 seconds. Neurological:      Mental Status: She is alert and oriented to person, place, and time. Psychiatric:         Behavior: Behavior normal.         Thought Content: Thought content normal.          Test results   Lung Nodule Screening     [] Qualifies    [x] Does not qualify   [] Declined    [] Completed  Nonsmoker   The USPSTF recommends annual screening for lung cancer with low-dose computed tomography (LDCT) in adults aged 54 to [de-identified] years who have a 30 pack-year smoking history and currently smoke or have quit within the past 15 years. Screeningshould be discontinued once a person has not smoked for 15 years or develops a health problem that substantially limits life expectancy or the ability or willingness to have curative lung surgery. CT CHEST W CONTRAST   1/3/2020   FINDINGS: Lungs: There is a lobulated 2.3 x 3 cm lateral right upper lobe pulmonary mass consistent with primary lung carcinoma versus a metastasis; there is an additional 8 x 7 mm left upper lobe pulmonary nodule (axial image 26). There is also a subtle 4 x 3 mm noncalcified nodule in the lateral right middle lobe (axial image 31). There are a few scattered calcified granulomas. There is bilateral multilobar atelectasis. There is no pneumonia. The trachea and major bronchi are unremarkable. Pleura: No pleural effusion. No pneumothorax. Diaphragm: There is moderate elevation of the right hemidiaphragm. Heart: Heart size is normal. There is no pericardial effusion. Isamar and mediastinum: There is no mass or adenopathy. There are calcified mediastinal and right hilar lymph nodes. There is a small hiatal hernia. Pulmonary vasculature: Unremarkable Thoracic aorta/vascular: No thoracic aortic aneurysm or dissection.  The imaged portions of the brachiocephalic arteries are unremarkable. Imaged upper abdomen: See the accompanying abdomen pelvis CT report. Musculoskeletal system: There is multilevel cervical and thoracic degenerative disc disease and endplate spondylosis. There is a mild to moderate compression fracture of T2 of indeterminate age however there is an acute minimally displaced fracture of the anterior aspect of the vertebral body. This is better seen on the accompanying cervical spine CT. There is associated mild prevertebral edema or hemorrhage at this level. . There is an old mild compression fracture of T7. There is mild thoracic levoscoliosis. Chest/body wall soft tissues: There are postoperative changes of the left breast with multiple surgical clips and an adjacent 1.9 x 1.3 cm scar in the medial left breast.  Thyroid: There is a 1.5 x 1.3 cm right thyroid nodule. Consider nonemergent ultrasound for further evaluation. Impression:  Acute minimally displaced fracture of the anterior T2 vertebral body with an age indeterminate mild to moderate compression fracture component. Associated mild prevertebral edema or hemorrhage. 2.3 x 3 cm right upper lobe pulmonary mass consistent with primary lung carcinoma versus metastasis. 8 x 7 mm left upper lobe pulmonary nodule. No acute traumatic abnormality of the chest. No acute pneumonia. Bilateral multilobar atelectasis. 1.5 x 1.3 cm right thyroid nodule, consider nonemergent ultrasound for further evaluation. Postoperative changes of the left breast.            CT ABDOMEN PELVIS W IV CONTRAST   1/3/2020 see full report for details  No acute traumatic abnormality of the solid or hollow viscera of the abdomen and pelvis. No acute inflammatory or infectious process in the abdomen or pelvis. Colonic diverticulosis without diverticulitis. Severe pancreatic atrophy with marked diffuse pancreatic ductal dilatation. 2 subcentimeter nodular foci in the duct in the pancreatic tail. Neoplasm not excluded. Additional nonemergent findings as detailed above. 1/8/2020  Underwent CT guided biopsy of RUL lung mass with Dr. Katia Cortez       XR CHEST PA INSPIRATION 1 VW   1/8/2020 2:00 PM   No pneumothorax status post right lung biopsy       XR CHEST PA INSPIRATION 1 VW   1/8/2020 2:02 PM   Approximately 5% right apical pneumothorax. XR CHEST (2 VW)   1/9/2020 8:32 AM   Slight increase in size of the right pneumothorax. XR CHEST 1 VIEW   1/9/2020 1:39 PM   No significant change in the right apical pneumothorax which is less than 10%. Continued follow-up is recommended. XR CHEST (2 VW)   1/10/2020   The heart size is normal.  The mediastinum is not widened. There is a small right apical pneumothorax. This has decreased in size when compared to yesterday at the apex previously measuring 16 mm currently measuring 8 mm. Along the lateral margin is even more reduced. No infiltrates. Chronic. Elevated right hemidiaphragm. The pulmonary vascularity is normal. T2 kyphoplasty. Diffuse osteopenia. Impression:  Improving right apical pneumothorax. No images available for review       Assessment      Diagnosis Orders   1. Pneumothorax, post biopsy, right      Resolved   2.  Mass of upper lobe of right lung       Non smoker  No history of chronic lung disease or respiratory complaint  Plan   -Patient CXR reviewed no further pneumothorax per radiology report, no images for review, no clinical indication on exam no hypoxia, shortness of breath or chest pain.  -Biopsy ordered by Pain Service/Oncology Advised patient to keep scheduled follow-up with Pain management service 1/20/2020 and Oncology with Dr. Katerine Esparza on 1/21/2020 for biopsy results, staging, and treatment options  -Advised patient and Daughter-in-laws that no further pulmonary imaging is necessary and she can follow-up with our office as needed advised patient to call office with any changes, questions, or concerns regarding respiratory status    Will see Shila Every Schoffner back as needed if pulmonary symptoms reoccur    Eddy Conner CNP  1/15/2020

## 2020-01-20 ENCOUNTER — OFFICE VISIT (OUTPATIENT)
Dept: PHYSICAL MEDICINE AND REHAB | Age: 85
End: 2020-01-20
Payer: MEDICARE

## 2020-01-20 VITALS
BODY MASS INDEX: 30.9 KG/M2 | WEIGHT: 174.38 LBS | HEIGHT: 63 IN | DIASTOLIC BLOOD PRESSURE: 78 MMHG | SYSTOLIC BLOOD PRESSURE: 136 MMHG

## 2020-01-20 PROCEDURE — 1036F TOBACCO NON-USER: CPT | Performed by: NURSE PRACTITIONER

## 2020-01-20 PROCEDURE — G8427 DOCREV CUR MEDS BY ELIG CLIN: HCPCS | Performed by: NURSE PRACTITIONER

## 2020-01-20 PROCEDURE — G8484 FLU IMMUNIZE NO ADMIN: HCPCS | Performed by: NURSE PRACTITIONER

## 2020-01-20 PROCEDURE — G8400 PT W/DXA NO RESULTS DOC: HCPCS | Performed by: NURSE PRACTITIONER

## 2020-01-20 PROCEDURE — 1111F DSCHRG MED/CURRENT MED MERGE: CPT | Performed by: NURSE PRACTITIONER

## 2020-01-20 PROCEDURE — 4040F PNEUMOC VAC/ADMIN/RCVD: CPT | Performed by: NURSE PRACTITIONER

## 2020-01-20 PROCEDURE — G8417 CALC BMI ABV UP PARAM F/U: HCPCS | Performed by: NURSE PRACTITIONER

## 2020-01-20 PROCEDURE — 1123F ACP DISCUSS/DSCN MKR DOCD: CPT | Performed by: NURSE PRACTITIONER

## 2020-01-20 PROCEDURE — 99214 OFFICE O/P EST MOD 30 MIN: CPT | Performed by: NURSE PRACTITIONER

## 2020-01-20 PROCEDURE — 1090F PRES/ABSN URINE INCON ASSESS: CPT | Performed by: NURSE PRACTITIONER

## 2020-01-20 ASSESSMENT — ENCOUNTER SYMPTOMS: BACK PAIN: 1

## 2020-01-20 NOTE — PROGRESS NOTES
Cancer ([facility] [address] Depression, Diabetes ([facility] [address] Hyperlipidemia, Hypertension, Hypothyroidism, PONV (postoperative nausea and vomiting), and Spinal stenosis. Past Surgical History  The patient  has a past surgical history that includes Hysterectomy (1980); Tonsillectomy; eye surgery (2006); Breast surgery (Left); other surgical history (Left, 11/29/2016); and Kyphosis surgery (N/A, 1/7/2020). Family History  This patient's family history includes Cancer in her father and paternal grandfather. Social History  Chayo العلي  reports that she has never smoked. She has never used smokeless tobacco. She reports that she does not drink alcohol or use drugs. Medications    Current Outpatient Medications:     docusate sodium (COLACE, DULCOLAX) 100 MG CAPS, Take 100 mg by mouth 2 times daily, Disp: 60 capsule, Rfl: 0    lidocaine 4 % external patch, Place 1 patch onto the skin daily Apply to back, Disp: 30 patch, Rfl: 0    insulin lispro (HUMALOG) 100 UNIT/ML injection vial, Inject 0-12 Units into the skin 3 times daily (with meals), Disp: 1 vial, Rfl: 3    metFORMIN (GLUCOPHAGE) 500 MG tablet, Take 1 tablet by mouth 2 times daily (with meals), Disp: 60 tablet, Rfl: 3    metoprolol succinate (TOPROL XL) 25 MG extended release tablet, Take 25 mg by mouth daily , Disp: , Rfl:     aspirin 81 MG tablet, Take 81 mg by mouth daily, Disp: , Rfl:     desonide (DESOWEN) 0.05 % cream, Apply topically 2 times daily Apply topically 2 times daily. , Disp: , Rfl:     acetaminophen (TYLENOL) 325 MG tablet, Take 650 mg by mouth as needed , Disp: , Rfl:     ramipril (ALTACE) 5 MG capsule, Take 2.5 mg by mouth daily , Disp: , Rfl:     venlafaxine (EFFEXOR) 37.5 MG tablet, Take 75 mg by mouth daily , Disp: , Rfl:     Multiple Vitamins-Minerals (MULTIVITAMIN PO), Take by mouth daily, Disp: , Rfl:     Multiple Vitamins-Minerals (PRESERVISION AREDS PO), Take 1 capsule by mouth 2 times daily, Disp: , Rfl:     levothyroxine (SYNTHROID) 125 MCG tablet, Take 100 mcg by mouth Daily , Disp: , Rfl:     amitriptyline (ELAVIL) 10 MG tablet, Take 25 mg by mouth nightly , Disp: , Rfl:     pravastatin (PRAVACHOL) 20 MG tablet, Take 20 mg by mouth daily, Disp: , Rfl:     doxycycline hyclate (PERIOSTAT) 20 MG tablet, Take 50 mg by mouth daily , Disp: , Rfl:     calcium carbonate 600 MG TABS tablet, Take 1 tablet by mouth 2 times daily , Disp: , Rfl:     Subjective:      Review of Systems   Musculoskeletal: Positive for arthralgias, back pain, joint swelling and myalgias. Neurological: Positive for weakness. Negative for numbness. Objective:     Vitals:    01/20/20 1248   BP: 136/78   Site: Right Upper Arm   Position: Sitting   Cuff Size: Medium Adult   Weight: 174 lb 6.1 oz (79.1 kg)   Height: 5' 2.99\" (1.6 m)       Physical Exam  Constitutional:       Appearance: Normal appearance. She is normal weight. HENT:      Head: Normocephalic and atraumatic. Right Ear: External ear normal.      Left Ear: External ear normal.      Nose: Nose normal.      Mouth/Throat:      Mouth: Mucous membranes are moist.      Pharynx: Oropharynx is clear. Eyes:      Extraocular Movements: Extraocular movements intact. Pupils: Pupils are equal, round, and reactive to light. Neck:      Musculoskeletal: Normal range of motion and neck supple. Cardiovascular:      Rate and Rhythm: Normal rate and regular rhythm. Pulses: Normal pulses. Heart sounds: No murmur. Pulmonary:      Breath sounds: Normal breath sounds. Abdominal:      General: Abdomen is flat. Palpations: Abdomen is soft. Musculoskeletal:         General: Tenderness present. Right hip: She exhibits decreased range of motion and tenderness. Left hip: She exhibits decreased range of motion, decreased strength and tenderness. Right knee: She exhibits decreased range of motion. Tenderness found. Left knee: She exhibits decreased range of motion. Tenderness found. Cervical back: She exhibits decreased range of motion. Thoracic back: She exhibits decreased range of motion, tenderness and bony tenderness. Lumbar back: She exhibits decreased range of motion, tenderness and bony tenderness. Back:    Skin:     General: Skin is warm and dry. Capillary Refill: Capillary refill takes less than 2 seconds. Neurological:      Mental Status: She is alert and oriented to person, place, and time. Sensory: Sensation is intact. Motor: Weakness present. Coordination: Coordination abnormal.      Deep Tendon Reflexes:      Reflex Scores:       Tricep reflexes are 2+ on the right side and 2+ on the left side. Bicep reflexes are 2+ on the right side and 2+ on the left side. Brachioradialis reflexes are 2+ on the right side and 2+ on the left side. Patellar reflexes are 2+ on the right side and 2+ on the left side. Achilles reflexes are 2+ on the right side and 2+ on the left side. Comments: 4/5 strength all 4 extremities    Psychiatric:         Mood and Affect: Mood normal.       BRAD test: negative   Yeomans test: negative   Gaenslen test: negative      Assessment:     1. Mass of left lung    2. History of breast cancer    3. Pathological compression fracture of thoracic vertebra with routine healing, subsequent encounter    4. Spondylosis of thoracic region without myelopathy or radiculopathy    5. Age-related osteoporosis with current pathological fracture, initial encounter            Plan:      · OARRS reviewed. Current MED: 0  · Patient was not offered naloxone for home. · Discussed long term side effects of medications, tolerance, dependency and addiction. · Previous UDS reviewed  · UDS preformed today for compliance. · Patient told can not receive any pain medications from any other source. · No evidence of abuse, diversion or aberrant behavior.    Medications and/or procedures to improve function and quality of life- patient understanding with this and that may not be pain free   Discussed with patient about safe storage of medications at home   Discussed possible weaning of medication dosing dependent on treatment/procedure results.  Discussed with patient about risks with procedure including infection, reaction to medication, increased pain, or bleeding.  Procedure and hospital notes reviewed in detail.  Receiving over 90% relief of pain at Kyphoplasty site    Pain lower- discussed L-facet MBB possibly in future. Will follow up in 1 month to allow healing from Kyphoplasty.  Kyphoplasty suture removed- tolerating well with no signs of infection     Continue therapies at [facility]. Prescribed:   No orders of the defined types were placed in this encounter. Return in about 1 month (around 2/20/2020), or if symptoms worsen or fail to improve, for follow up after procedure.          Electronically signed by JAYSON Vieyra CNP on1/20/2020 at 6:05 PM

## 2020-01-21 ENCOUNTER — HOSPITAL ENCOUNTER (OUTPATIENT)
Dept: INFUSION THERAPY | Age: 85
Discharge: HOME | End: 2020-01-21
Payer: COMMERCIAL

## 2020-01-21 ENCOUNTER — OFFICE VISIT (OUTPATIENT)
Dept: ONCOLOGY | Age: 85
End: 2020-01-21
Payer: MEDICARE

## 2020-01-21 VITALS
WEIGHT: 175 LBS | OXYGEN SATURATION: 97 % | HEART RATE: 81 BPM | TEMPERATURE: 97.1 F | RESPIRATION RATE: 18 BRPM | BODY MASS INDEX: 31.01 KG/M2 | DIASTOLIC BLOOD PRESSURE: 74 MMHG | SYSTOLIC BLOOD PRESSURE: 150 MMHG | HEIGHT: 63 IN

## 2020-01-21 PROCEDURE — 1111F DSCHRG MED/CURRENT MED MERGE: CPT | Performed by: INTERNAL MEDICINE

## 2020-01-21 PROCEDURE — G8427 DOCREV CUR MEDS BY ELIG CLIN: HCPCS | Performed by: INTERNAL MEDICINE

## 2020-01-21 PROCEDURE — 1036F TOBACCO NON-USER: CPT | Performed by: INTERNAL MEDICINE

## 2020-01-21 PROCEDURE — 4040F PNEUMOC VAC/ADMIN/RCVD: CPT | Performed by: INTERNAL MEDICINE

## 2020-01-21 PROCEDURE — 1090F PRES/ABSN URINE INCON ASSESS: CPT | Performed by: INTERNAL MEDICINE

## 2020-01-21 PROCEDURE — 1123F ACP DISCUSS/DSCN MKR DOCD: CPT | Performed by: INTERNAL MEDICINE

## 2020-01-21 PROCEDURE — 99214 OFFICE O/P EST MOD 30 MIN: CPT | Performed by: INTERNAL MEDICINE

## 2020-01-21 PROCEDURE — 99211 OFF/OP EST MAY X REQ PHY/QHP: CPT

## 2020-01-21 PROCEDURE — G8400 PT W/DXA NO RESULTS DOC: HCPCS | Performed by: INTERNAL MEDICINE

## 2020-01-21 PROCEDURE — G8417 CALC BMI ABV UP PARAM F/U: HCPCS | Performed by: INTERNAL MEDICINE

## 2020-01-21 PROCEDURE — G8484 FLU IMMUNIZE NO ADMIN: HCPCS | Performed by: INTERNAL MEDICINE

## 2020-01-21 ASSESSMENT — ENCOUNTER SYMPTOMS
VOMITING: 0
COLOR CHANGE: 0
ABDOMINAL DISTENTION: 0
EYE ITCHING: 0
COUGH: 0
SORE THROAT: 0
EYE REDNESS: 0
ABDOMINAL PAIN: 0
CONSTIPATION: 0
RECTAL PAIN: 0
EYE DISCHARGE: 0
BLOOD IN STOOL: 0
FACIAL SWELLING: 0
NAUSEA: 0
VOICE CHANGE: 0
WHEEZING: 0
TROUBLE SWALLOWING: 0
CHEST TIGHTNESS: 0
SHORTNESS OF BREATH: 0
DIARRHEA: 0
BACK PAIN: 0

## 2020-12-03 ENCOUNTER — HOSPITAL ENCOUNTER (OUTPATIENT)
Dept: GENERAL RADIOLOGY | Age: 85
Discharge: HOME | DRG: 054 | End: 2020-12-03
Payer: MEDICARE

## 2020-12-03 ENCOUNTER — HOSPITAL ENCOUNTER (OUTPATIENT)
Age: 85
Discharge: HOME | DRG: 054 | End: 2020-12-03
Payer: MEDICARE

## 2020-12-03 PROCEDURE — 72100 X-RAY EXAM L-S SPINE 2/3 VWS: CPT

## 2020-12-03 PROCEDURE — 73502 X-RAY EXAM HIP UNI 2-3 VIEWS: CPT

## 2020-12-03 PROCEDURE — 73090 X-RAY EXAM OF FOREARM: CPT

## 2020-12-05 ENCOUNTER — HOSPITAL ENCOUNTER (INPATIENT)
Age: 85
LOS: 2 days | Discharge: NURSING, REHAB OR LONG-TERM CARE | DRG: 054 | End: 2020-12-07
Attending: INTERNAL MEDICINE | Admitting: INTERNAL MEDICINE
Payer: MEDICARE

## 2020-12-05 PROBLEM — C50.912 PRIMARY CANCER OF LEFT BREAST WITH METASTASIS TO OTHER SITE (HCC): Status: ACTIVE | Noted: 2020-12-05

## 2020-12-05 LAB — GLUCOSE BLD-MCNC: 241 MG/DL (ref 70–108)

## 2020-12-05 PROCEDURE — 6360000002 HC RX W HCPCS: Performed by: INTERNAL MEDICINE

## 2020-12-05 PROCEDURE — 2580000003 HC RX 258: Performed by: INTERNAL MEDICINE

## 2020-12-05 PROCEDURE — 6370000000 HC RX 637 (ALT 250 FOR IP): Performed by: INTERNAL MEDICINE

## 2020-12-05 PROCEDURE — 2060000000 HC ICU INTERMEDIATE R&B

## 2020-12-05 PROCEDURE — 94760 N-INVAS EAR/PLS OXIMETRY 1: CPT

## 2020-12-05 PROCEDURE — 99223 1ST HOSP IP/OBS HIGH 75: CPT | Performed by: INTERNAL MEDICINE

## 2020-12-05 PROCEDURE — 82948 REAGENT STRIP/BLOOD GLUCOSE: CPT

## 2020-12-05 PROCEDURE — 2700000000 HC OXYGEN THERAPY PER DAY

## 2020-12-05 RX ORDER — SODIUM CHLORIDE 0.9 % (FLUSH) 0.9 %
10 SYRINGE (ML) INJECTION PRN
Status: DISCONTINUED | OUTPATIENT
Start: 2020-12-05 | End: 2020-12-07 | Stop reason: HOSPADM

## 2020-12-05 RX ORDER — ONDANSETRON 2 MG/ML
4 INJECTION INTRAMUSCULAR; INTRAVENOUS EVERY 6 HOURS PRN
Status: DISCONTINUED | OUTPATIENT
Start: 2020-12-05 | End: 2020-12-07 | Stop reason: HOSPADM

## 2020-12-05 RX ORDER — SULFAMETHOXAZOLE AND TRIMETHOPRIM 400; 80 MG/1; MG/1
1 TABLET ORAL 2 TIMES DAILY
Status: ON HOLD | COMMUNITY
Start: 2020-12-02 | End: 2020-12-07 | Stop reason: HOSPADM

## 2020-12-05 RX ORDER — METOPROLOL SUCCINATE 25 MG/1
25 TABLET, EXTENDED RELEASE ORAL DAILY
Status: DISCONTINUED | OUTPATIENT
Start: 2020-12-06 | End: 2020-12-07 | Stop reason: HOSPADM

## 2020-12-05 RX ORDER — ACETAMINOPHEN 325 MG/1
650 TABLET ORAL EVERY 6 HOURS PRN
Status: DISCONTINUED | OUTPATIENT
Start: 2020-12-05 | End: 2020-12-07 | Stop reason: HOSPADM

## 2020-12-05 RX ORDER — DEXAMETHASONE SODIUM PHOSPHATE 4 MG/ML
4 INJECTION, SOLUTION INTRA-ARTICULAR; INTRALESIONAL; INTRAMUSCULAR; INTRAVENOUS; SOFT TISSUE EVERY 6 HOURS
Status: DISCONTINUED | OUTPATIENT
Start: 2020-12-05 | End: 2020-12-05 | Stop reason: ALTCHOICE

## 2020-12-05 RX ORDER — DEXAMETHASONE 4 MG/1
4 TABLET ORAL EVERY 6 HOURS
Status: DISCONTINUED | OUTPATIENT
Start: 2020-12-05 | End: 2020-12-07 | Stop reason: HOSPADM

## 2020-12-05 RX ORDER — ASPIRIN 81 MG/1
81 TABLET ORAL DAILY
Status: DISCONTINUED | OUTPATIENT
Start: 2020-12-06 | End: 2020-12-07 | Stop reason: HOSPADM

## 2020-12-05 RX ORDER — AMITRIPTYLINE HYDROCHLORIDE 25 MG/1
25 TABLET, FILM COATED ORAL NIGHTLY
Status: DISCONTINUED | OUTPATIENT
Start: 2020-12-05 | End: 2020-12-07 | Stop reason: HOSPADM

## 2020-12-05 RX ORDER — DEXTROSE MONOHYDRATE 50 MG/ML
100 INJECTION, SOLUTION INTRAVENOUS PRN
Status: DISCONTINUED | OUTPATIENT
Start: 2020-12-05 | End: 2020-12-07 | Stop reason: HOSPADM

## 2020-12-05 RX ORDER — LIDOCAINE 4 G/G
1 PATCH TOPICAL DAILY
Status: DISCONTINUED | OUTPATIENT
Start: 2020-12-05 | End: 2020-12-07 | Stop reason: HOSPADM

## 2020-12-05 RX ORDER — MORPHINE SULFATE 2 MG/ML
2 INJECTION, SOLUTION INTRAMUSCULAR; INTRAVENOUS
Status: DISCONTINUED | OUTPATIENT
Start: 2020-12-05 | End: 2020-12-07 | Stop reason: HOSPADM

## 2020-12-05 RX ORDER — CALCIUM CARBONATE 200(500)MG
500 TABLET,CHEWABLE ORAL 2 TIMES DAILY
Status: DISCONTINUED | OUTPATIENT
Start: 2020-12-05 | End: 2020-12-07 | Stop reason: HOSPADM

## 2020-12-05 RX ORDER — PROMETHAZINE HYDROCHLORIDE 25 MG/1
12.5 TABLET ORAL EVERY 6 HOURS PRN
Status: DISCONTINUED | OUTPATIENT
Start: 2020-12-05 | End: 2020-12-07 | Stop reason: HOSPADM

## 2020-12-05 RX ORDER — SULFAMETHOXAZOLE AND TRIMETHOPRIM 400; 80 MG/1; MG/1
1 TABLET ORAL 2 TIMES DAILY
Status: DISCONTINUED | OUTPATIENT
Start: 2020-12-05 | End: 2020-12-07 | Stop reason: HOSPADM

## 2020-12-05 RX ORDER — SODIUM CHLORIDE 0.9 % (FLUSH) 0.9 %
10 SYRINGE (ML) INJECTION EVERY 12 HOURS SCHEDULED
Status: DISCONTINUED | OUTPATIENT
Start: 2020-12-05 | End: 2020-12-07 | Stop reason: HOSPADM

## 2020-12-05 RX ORDER — NICOTINE POLACRILEX 4 MG
15 LOZENGE BUCCAL PRN
Status: DISCONTINUED | OUTPATIENT
Start: 2020-12-05 | End: 2020-12-07 | Stop reason: HOSPADM

## 2020-12-05 RX ORDER — DEXTROSE MONOHYDRATE 25 G/50ML
12.5 INJECTION, SOLUTION INTRAVENOUS PRN
Status: DISCONTINUED | OUTPATIENT
Start: 2020-12-05 | End: 2020-12-07 | Stop reason: HOSPADM

## 2020-12-05 RX ORDER — RAMIPRIL 5 MG/1
5 CAPSULE ORAL DAILY
Status: DISCONTINUED | OUTPATIENT
Start: 2020-12-05 | End: 2020-12-07 | Stop reason: HOSPADM

## 2020-12-05 RX ORDER — DOCUSATE SODIUM 100 MG/1
100 CAPSULE, LIQUID FILLED ORAL 2 TIMES DAILY
Status: DISCONTINUED | OUTPATIENT
Start: 2020-12-05 | End: 2020-12-07 | Stop reason: HOSPADM

## 2020-12-05 RX ORDER — LEVOTHYROXINE SODIUM 88 UG/1
88 TABLET ORAL DAILY
Status: DISCONTINUED | OUTPATIENT
Start: 2020-12-06 | End: 2020-12-07 | Stop reason: HOSPADM

## 2020-12-05 RX ORDER — ACETAMINOPHEN 650 MG/1
650 SUPPOSITORY RECTAL EVERY 6 HOURS PRN
Status: DISCONTINUED | OUTPATIENT
Start: 2020-12-05 | End: 2020-12-07 | Stop reason: HOSPADM

## 2020-12-05 RX ADMIN — ANTACID TABLETS 500 MG: 500 TABLET, CHEWABLE ORAL at 20:23

## 2020-12-05 RX ADMIN — DEXAMETHASONE 4 MG: 4 TABLET ORAL at 20:23

## 2020-12-05 RX ADMIN — Medication 10 ML: at 20:24

## 2020-12-05 RX ADMIN — INSULIN LISPRO 2 UNITS: 100 INJECTION, SOLUTION INTRAVENOUS; SUBCUTANEOUS at 20:28

## 2020-12-05 RX ADMIN — DOCUSATE SODIUM 100 MG: 100 CAPSULE, LIQUID FILLED ORAL at 20:23

## 2020-12-05 RX ADMIN — SULFAMETHOXAZOLE AND TRIMETHOPRIM 1 TABLET: 400; 80 TABLET ORAL at 21:57

## 2020-12-05 RX ADMIN — RAMIPRIL 5 MG: 5 CAPSULE ORAL at 20:23

## 2020-12-05 ASSESSMENT — PAIN DESCRIPTION - PAIN TYPE: TYPE: ACUTE PAIN

## 2020-12-05 ASSESSMENT — PAIN DESCRIPTION - LOCATION: LOCATION: BACK

## 2020-12-05 ASSESSMENT — PAIN SCALES - GENERAL
PAINLEVEL_OUTOF10: 8
PAINLEVEL_OUTOF10: 0

## 2020-12-05 ASSESSMENT — PAIN DESCRIPTION - ORIENTATION: ORIENTATION: LOWER

## 2020-12-05 NOTE — H&P
Hospitalist H+P      Patient:  Rachael Arriaga    Unit/Bed:4A-06/006-A  YOB: 1934  MRN: [id number]   Acct: [de-identified]     PCP: Carolee Snider MD  Date of Admission: 12/5/2020        Assessment and Plan:        1. Primary cancer of breast with metastatic disease to other sites (brain and lung) we will place the patient on oral Decadron family request DNR CC and hospice consult  2. Hypertension we will continue home medications  3. Back pain continue Lidoderm patch  4. Diabetes mellitus: We will place the patient on insulin sliding scale      CC: Falling, confusion, hallucinations    HPI: Patient is a 66-year-old female from [address] [place] [address] with Dr. Ashu Decker transferring. Patient has a history of breast cancer treated with lumpectomy and radiation a few years ago. She was a found to have a 5 x 4 cm mass on chest x-ray at an earlier visit and was discussed not to do any further treatment at this time and even discussed follow-up outpatient hospice in a few weeks to determine eligibility, the family has an appointment this Monday with hospice. Patient presented today because family noticed her falling more, confusion, and and hallucinations at times. CT head showed a left parietal mass with edema. CT spine showed a potential lumbar fracture with endplate deformity of L1. She is alert and oriented and appropriate but confused at times. Patient's past medical history includes spinal stenosis hypothyroidism, hypertension, hyperlipidemia, diabetes, depression, breast cancer    ROS (14 point review of systems completed. Pertinent positives noted.  Otherwise ROS is negative) : Confusion, hallucinations, frequent falls    PMH:  Per HPI and       Diagnosis Date    Arthritis     Cancer ([facility] [address] 11/2016    LEFT BREAST    Depression     Diabetes ([facility] [address]     Hyperlipidemia     Hypertension     Hypothyroidism     PONV (postoperative nausea and vomiting)     Spinal stenosis      SHX:        Procedure patch Transdermal Daily    [START ON 12/6/2020] metoprolol succinate  25 mg Oral Daily    ramipril  5 mg Oral Daily    sodium chloride flush  10 mL Intravenous 2 times per day    insulin lispro  0-12 Units Subcutaneous TID     insulin lispro  0-6 Units Subcutaneous Nightly    dexamethasone  4 mg Intravenous Q6H     Prior to Admission medications    Medication Sig Start Date End Date Taking? Authorizing Provider   insulin lispro (HUMALOG) 100 UNIT/ML injection vial Inject 0-12 Units into the skin 3 times daily (with meals) 1/9/20  Yes [place] Rouse MD   metFORMIN (GLUCOPHAGE) 500 MG tablet Take 1 tablet by mouth 2 times daily (with meals) 1/9/20  Yes [place] Rouse MD   metoprolol succinate (TOPROL XL) 25 MG extended release tablet Take 25 mg by mouth daily  10/23/17  Yes Historical Provider, MD   aspirin 81 MG tablet Take 81 mg by mouth daily   Yes Historical Provider, MD   acetaminophen (TYLENOL) 325 MG tablet Take 650 mg by mouth as needed    Yes Historical Provider, MD   ramipril (ALTACE) 2.5 MG capsule Take 5 mg by mouth daily    Yes Historical Provider, MD   venlafaxine (EFFEXOR) 37.5 MG tablet Take 75 mg by mouth daily    Yes Historical Provider, MD   amitriptyline (ELAVIL) 10 MG tablet Take 25 mg by mouth nightly    Yes Historical Provider, MD   pravastatin (PRAVACHOL) 20 MG tablet Take 20 mg by mouth daily   Yes Historical Provider, MD   docusate sodium (COLACE, DULCOLAX) 100 MG CAPS Take 100 mg by mouth 2 times daily 1/9/20   [place] Rouse MD   lidocaine 4 % external patch Place 1 patch onto the skin daily Apply to back 1/10/20   [place] Rouse MD   desonide (DESOWEN) 0.05 % cream Apply topically 2 times daily Apply topically 2 times daily.     Historical Provider, MD   Multiple Vitamins-Minerals (MULTIVITAMIN PO) Take by mouth daily    Historical Provider, MD   Multiple Vitamins-Minerals (PRESERVISION AREDS PO) Take 1 capsule by mouth 2 times daily    Historical Provider, MD   levothyroxine (SYNTHROID) 88 MCG tablet Take 88 mcg by mouth Daily     Historical Provider, MD   doxycycline hyclate (PERIOSTAT) 20 MG tablet Take 50 mg by mouth daily     Historical Provider, MD   calcium carbonate 600 MG TABS tablet Take 1 tablet by mouth 2 times daily     Historical Provider, MD      PHYSICAL EXAM:    BP (!) 158/70   Pulse 83   Temp 99.1 °F (37.3 °C) (Oral)   Resp 18   Wt 184 lb 4.8 oz (83.6 kg)   SpO2 98%   BMI 32.65 kg/m²     General appearance:  No apparent distress, appears stated age and cooperative. HEENT:  Normal cephalic, atraumatic without obvious deformity. Pupils equal, round, and reactive to light. Extra ocular muscles intact. Conjunctivae/corneas clear. Neck: Supple, with full range of motion. no jugular venous distention. Trachea midline. no carotid bruits  Respiratory:  Normal respiratory effort. Clear to auscultation, bilaterally without Rales/Wheezes/Rhonchi. Breath sounds equal bilaterally  Cardiovascular:  Regular rate and rhythm with normal S1/S2 without murmurs, rubs or gallops. PMI non displaced  Abdomen: Soft, non-tender, non-distended with normal bowel sounds. No guarding, rebound. Musculoskeletal:  No clubbing, cyanosis or edema bilaterally. Full range of motion without deformity. Skin: Skin color, texture, turgor normal.  No rashes or lesions, or suspicious lesions. Neurologic:  Neurovascularly intact without any focal sensory/motor deficits. Cranial nerves: II-XII intact, grossly non-focal.  Did not ambulate at this time  Psychiatric:  Alert and oriented, thought content appropriate, normal insight  Capillary Refill: Brisk,< 2 seconds   Peripheral Pulses: +2 palpable, equal bilaterally upper and lower extremities  Lymphatics: no lymphadenopathy    Data: (All radiographs, tracings, PFTs, and imaging are personally viewed and interpreted unless otherwise noted).     Laboratory data was obtained through care everywhere:   UA negative      Sodium 131   Potassium radiation dose to as low   as reasonably achievable.; CT of the pelvis was performed without the   administration of intravenous contrast. Multiplanar reformatted images are   provided for review. Dose modulation, iterative reconstruction, and/or weight   based adjustment of the mA/kV was utilized to reduce the radiation dose to as   low as reasonably achievable. COMPARISON:   None. HISTORY:   Reason for exam: : pain Note: 2 - TECH HISTORY severe mid back pain,   diagnosed with stage 4 breast cancer in January, lung cancer     FINDINGS:   BONES/ALIGNMENT: There is normal alignment of the spine. Acute appearing   superior endplate deformity L1. The vertebral body heights are otherwise   maintained. No osseous destructive lesion is seen. Status post vertebral   augmentation at T2. Diffuse osteopenia. DEGENERATIVE CHANGES: No gross spinal canal stenosis or bony neural foraminal   narrowing of the thoracic spine. SOFT TISSUES: 5.4 x 4.0 cm mass in the right upper lobe with broad base along   the posterolateral costal pleura. 8 mm solid nodule in the left upper lobe   on series 3, image 50. left colonic diverticulosis. IMPRESSION:   1. Acute appearing superior endplate deformity of L1.   2. No acute abnormality of the thoracic spine or bony pelvis. 3. No bony metastases seen. 4. Approximate 5.4 cm mass of the right upper lobe almost certainly   representing the patient's lung cancer. 5. Solid pulmonary nodule of the left upper lobe measuring 8 mm,   indeterminate. Patient Name: Thierry Deshpande MR #: [id number]   YOB: 1934 Gender: F   Service Type: ER Pt. Type: E   Ordering Phys: Chloe Garcia MD Account #: [de-identified]   Admitting Phys: Accession #: [id number]   Family Phys:    Additional CC Phys:       MEDICAL IMAGING REPORT           EXAM: CT HEAD WO CONTRAST     Exam Date: 12/05/2020 Exam Time: 11:25:50     CLINICAL HISTORY: confusion null     [facility] ACQUIRED HISTORY: COMPARISON: No prior study. TECHNIQUE: Standard AP and lateral views of lumbar spine were obtained. 1. Moderate diffuse demineralization of the bones, consistent with osteoporosis. Antegrade spondylolisthesis L4 upon L5, 8 mm. Mild vertebral body spondylosis scattered in the lumbar spine. 2. Moderately severe degenerative facet arthropathy L4-5 and L5-S1. No fracture. Degenerative changes left sacroiliac joint. **This report has been created using voice recognition software. It may contain minor errors which are inherent in voice recognition technology. ** Final report electronically signed by Dr. Eden Gomes on 12/3/2020 11:13 AM    Xr Radius Ulna Left (2 Views)    Result Date: 12/3/2020  Procedure:XR RADIUS ULNA LEFT (2 VIEWS) Clinical information:Left arm pain. Trauma by fall Technique: AP and lateral views of the forearm were obtained. Findings: No fracture or dislocation is seen. There is no foreign body. . Moderate degenerative changes involve the greater multangular/first metacarpal joint and the navicular/multangular joints. There are also marked degenerative changes involving the into films are joints of the thumb. There appears be mild soft tissue swelling overlying the dorsal aspect of the mid forearm. Impression: Soft tissue swelling dorsally. Degenerative changes in the wrist. No acute fracture seen. **This report has been created using voice recognition software. It may contain minor errors which are inherent in voice recognition technology. ** Final report electronically signed by Dr. Eden Gomes on 12/3/2020 12:04 PM    Xr Hip Left (2-3 Views)    Result Date: 12/3/2020  PROCEDURE: XR HIP LEFT (2-3 VIEWS) CLINICAL INFORMATION: Left hip pain COMPARISON: No prior study. TECHNIQUE: AP and frog-leg views of the hip were obtained. FINDINGS: No fracture or dislocation is seen. Normal abduction of the hip is demonstrated.  There is moderate hypertrophic spurring of the greater trochanter, likely related to prior trauma. There are moderate degenerative changes left sacroiliac joint. There are also mild degenerative changes of the pubic symphysis. 1. Hypertrophic spurring of the greater trochanter. Otherwise normal left hip. 2. Degenerative changes pubic symphysis and left sacroiliac joint. **This report has been created using voice recognition software. It may contain minor errors which are inherent in voice recognition technology. ** Final report electronically signed by Dr. Dashawn Dewitt on 12/3/2020 11:11 AM      Electronically signed by Kate Olson DO on 12/5/2020 at 5:41 PM

## 2020-12-05 NOTE — PROGRESS NOTES
Patient admitted to [facility] Room 06 via ambulance and from [address] [facility] upon arrival to the room : back pain  IV normal saline infusing into the forearm left, condition patent and no redness at a rate of gravity. IV site free of s/s of infection or infiltration. Vital signs obtained. Assessment and data collection initiated. Oriented to room. Policies and procedures for 4a explained All questions answered with no further questions at this time. Fall prevention and safety brochure discussed with patient.  2 person skin check completed with Tani Cardoso RN

## 2020-12-05 NOTE — PROGRESS NOTES
Patient is an 80year old from [facility] ED with Dr Meg Miller transferring. Patient with hx of breast Ca, treated with lumpectomy and radiation a few years ago. She was found to have a 5x4 cm mass on chest x-ray at an earlier visit and it was discussed to not do any further treatment at this time, and even discussed follow up outpatient hospice in a few weeks to determine eligibility. She is a full code at this time. Patient presented today because family noticed her falling more, confusion and hallucinations at times. CT head showed a left parietal mass with edema (no steroid given due to no mass effect). CT spine showed a potential lumbar fracture (end plate deformity at L1). She is alert and oriented and appropriate but confused at times. She will need nursing home placement after admission. CXR clear. Urine negative. Patient to go to [facility] dx brain mass under Dr Shelbi Barros.

## 2020-12-06 LAB
GLUCOSE BLD-MCNC: 162 MG/DL (ref 70–108)
GLUCOSE BLD-MCNC: 303 MG/DL (ref 70–108)

## 2020-12-06 PROCEDURE — 2580000003 HC RX 258: Performed by: INTERNAL MEDICINE

## 2020-12-06 PROCEDURE — 6370000000 HC RX 637 (ALT 250 FOR IP): Performed by: INTERNAL MEDICINE

## 2020-12-06 PROCEDURE — 2060000000 HC ICU INTERMEDIATE R&B

## 2020-12-06 PROCEDURE — 99232 SBSQ HOSP IP/OBS MODERATE 35: CPT | Performed by: INTERNAL MEDICINE

## 2020-12-06 PROCEDURE — 82948 REAGENT STRIP/BLOOD GLUCOSE: CPT

## 2020-12-06 PROCEDURE — 6360000002 HC RX W HCPCS: Performed by: INTERNAL MEDICINE

## 2020-12-06 RX ADMIN — AMITRIPTYLINE HYDROCHLORIDE 25 MG: 25 TABLET, FILM COATED ORAL at 20:38

## 2020-12-06 RX ADMIN — Medication 10 ML: at 20:39

## 2020-12-06 RX ADMIN — AMITRIPTYLINE HYDROCHLORIDE 25 MG: 25 TABLET, FILM COATED ORAL at 00:57

## 2020-12-06 RX ADMIN — Medication 10 ML: at 08:27

## 2020-12-06 RX ADMIN — METFORMIN HYDROCHLORIDE 500 MG: 500 TABLET ORAL at 19:06

## 2020-12-06 RX ADMIN — ASPIRIN 81 MG: 81 TABLET, COATED ORAL at 08:26

## 2020-12-06 RX ADMIN — RAMIPRIL 5 MG: 5 CAPSULE ORAL at 08:26

## 2020-12-06 RX ADMIN — ANTACID TABLETS 500 MG: 500 TABLET, CHEWABLE ORAL at 20:38

## 2020-12-06 RX ADMIN — DOCUSATE SODIUM 100 MG: 100 CAPSULE, LIQUID FILLED ORAL at 20:38

## 2020-12-06 RX ADMIN — DEXAMETHASONE 4 MG: 4 TABLET ORAL at 15:24

## 2020-12-06 RX ADMIN — LEVOTHYROXINE SODIUM 88 MCG: 88 TABLET ORAL at 06:13

## 2020-12-06 RX ADMIN — SULFAMETHOXAZOLE AND TRIMETHOPRIM 1 TABLET: 400; 80 TABLET ORAL at 20:38

## 2020-12-06 RX ADMIN — DEXAMETHASONE 4 MG: 4 TABLET ORAL at 20:38

## 2020-12-06 RX ADMIN — METOPROLOL SUCCINATE 25 MG: 25 TABLET, FILM COATED, EXTENDED RELEASE ORAL at 08:27

## 2020-12-06 RX ADMIN — DEXAMETHASONE 4 MG: 4 TABLET ORAL at 06:13

## 2020-12-06 RX ADMIN — DEXAMETHASONE 4 MG: 4 TABLET ORAL at 00:57

## 2020-12-06 RX ADMIN — SULFAMETHOXAZOLE AND TRIMETHOPRIM 1 TABLET: 400; 80 TABLET ORAL at 08:26

## 2020-12-06 RX ADMIN — ANTACID TABLETS 500 MG: 500 TABLET, CHEWABLE ORAL at 08:27

## 2020-12-06 RX ADMIN — DOCUSATE SODIUM 100 MG: 100 CAPSULE, LIQUID FILLED ORAL at 08:26

## 2020-12-06 ASSESSMENT — PAIN SCALES - GENERAL
PAINLEVEL_OUTOF10: 0

## 2020-12-06 NOTE — PROGRESS NOTES
This RN spoke with patient's son Santiago Lou and spouse on the telephone and updated them on patient's status and plan of care.

## 2020-12-06 NOTE — PROGRESS NOTES
Hospitalist Progress Note      Patient:  Balta Robertson    Unit/Bed:4A-06/006-A  YOB: 1934  MRN: [id number]   Acct: [de-identified]   PCP: Maria Teresa Vasquez MD  Date of Admission: 12/5/2020    Assessment/Plan:    1. Metastatic breast cancer: to chest and brain; pt meeting with hospice and is [facility]  12/6/20: pt told 6-8month expectancy 11months ago  2. Back pain with lumbar fx: sent here for possible kyphoplasy,which is an outpatient procedure  12/6/20: pt denies pain, family claims it; PT eval and follow  3. Essential HTN: c/w home meds and monitor  12/6/20: as above  4. DM2: stop SSI and glucose checks as she is [facility] meeting with hospice      Expected discharge date:  12/7    Disposition:    [] Home       [] TCU       [] Rehab       [] Psych       [x] SNF       [] [place]       [] Other-    Chief Complaint: Fall, confusion    Hospital Treatment Course: (Prior to today) \"Patient is a 80-year-old female from [address] [place] [address] with Dr. Vazquez Velasco transferring. Patient has a history of breast cancer treated with lumpectomy and radiation a few years ago. She was a found to have a 5 x 4 cm mass on chest x-ray at an earlier visit and was discussed not to do any further treatment at this time and even discussed follow-up outpatient hospice in a few weeks to determine eligibility, the family has an appointment this Monday with hospice. Patient presented today because family noticed her falling more, confusion, and and hallucinations at times. CT head showed a left parietal mass with edema. CT spine showed a potential lumbar fracture with endplate deformity of L1. She is alert and oriented and appropriate but confused at times.   Patient's past medical history includes spinal stenosis hypothyroidism, hypertension, hyperlipidemia, diabetes, depression, breast cancer\"    12/6/20: await hospice plan and need SW to help with placement     Subjective (past 24 hours): denies back pain (including when sitting up or standing) when seen, family at bedside says she does have it when standing       Past medical history, family history, social history and allergies reviewed again and is unchanged since admission. ROS (12 point review of systems completed. Pertinent positives noted. Otherwise ROS is negative)     Medications:  Reviewed    Infusion Medications    dextrose       Scheduled Medications    metFORMIN  500 mg Oral BID WC    amitriptyline  25 mg Oral Nightly    [Held by provider] aspirin  81 mg Oral Daily    calcium carbonate  500 mg Oral BID    docusate sodium  100 mg Oral BID    levothyroxine  88 mcg Oral Daily    lidocaine  1 patch Transdermal Daily    metoprolol succinate  25 mg Oral Daily    ramipril  5 mg Oral Daily    sodium chloride flush  10 mL Intravenous 2 times per day    dexamethasone  4 mg Oral Q6H    sulfamethoxazole-trimethoprim  1 tablet Oral BID     PRN Meds: sodium chloride flush, acetaminophen **OR** acetaminophen, promethazine **OR** ondansetron, glucose, dextrose, glucagon (rDNA), dextrose, morphine      Intake/Output Summary (Last 24 hours) at 12/6/2020 1731  Last data filed at 12/6/2020 1253  Gross per 24 hour   Intake 460 ml   Output 1250 ml   Net -790 ml       Diet:  DIET GENERAL;    Exam:  BP (!) 118/56   Pulse 73   Temp 97.7 °F (36.5 °C)   Resp 14   Wt 188 lb 14.4 oz (85.7 kg)   SpO2 94%   BMI 33.46 kg/m²   General appearance:  No apparent distress, appears stated age and cooperative. HEENT: Pupils equal, round, and reactive to light. Conjunctivae/corneas clear. Neck: Supple, with full range of motion. No jugular venous distention. Trachea midline. Respiratory:  Normal respiratory effort. Clear to auscultation, bilaterally without Rales/Wheezes/Rhonchi. Cardiovascular: Regular rate and rhythm with normal S1/S2 without murmurs, rubs or gallops.   Abdomen: Soft, non-tender, non-distended with normal bowel sounds. Musculoskeletal: passive and active ROM x 4 extremities. Skin: Skin color, texture, turgor normal.  No rashes or lesions. Neurologic:  Neurovascularly intact without any focal sensory/motor deficits. Cranial nerves: II-XII intact, grossly non-focal.  Psychiatric: Alert and oriented, thought content appropriate, normal insight  Capillary Refill: Brisk,< 3 seconds   Peripheral Pulses: +2 palpable, equal bilaterally     Labs:   No results for input(s): WBC, HGB, HCT, PLT in the last 72 hours. No results for input(s): NA, K, CL, CO2, BUN, CREATININE, CALCIUM, PHOS in the last 72 hours. Invalid input(s): MAGNES  No results for input(s): AST, ALT, BILIDIR, BILITOT, ALKPHOS in the last 72 hours. No results for input(s): INR in the last 72 hours. No results for input(s): Zaida Yohan in the last 72 hours. Microbiology:    Blood culture #1: No results found for: BC    Blood culture #2:No results found for: John Choi    Organism:No results found for: ORG    No results found for: LABGRAM    MRSA culture only:No results found for: [facility]    Urine culture:   Lab Results   Component Value Date    LABURIN No growth-preliminary No growth  01/03/2020       Respiratory culture: No results found for: CULTRESP    Aerobic and Anaerobic :  No results found for: LABAERO  No results found for: LABANAE    Urinalysis:      Lab Results   Component Value Date    NITRU NEGATIVE 01/03/2020    WBCUA 0-2 01/03/2020    BACTERIA NONE SEEN 01/03/2020    RBCUA 0-2 01/03/2020    BLOODU NEGATIVE 01/03/2020    SPECGRAV >1.030 01/03/2020       Radiology:  No orders to display     No results found.         DVT prophylaxis: [] Lovenox                                 [x] SCDs                                 [] SQ Heparin                                 [] Encourage ambulation           [] Already on Anticoagulation     Code Status: DNR-CC    Tele:   [] yes             [x] no    Electronically signed by Charles Harris DO on 12/6/2020 at 5:31 PM

## 2020-12-06 NOTE — PROGRESS NOTES
Presented hospice concepts and philosophies to patients daughters. Patient is 80years old and has a dx of breast cancer. She lives at home alone and her daughters noticed increased confusion. She fell at home and was c/o increased back pain. It was found pt had a lumbar fracture. Her daughters do not think it is safe for her to go home alone any longer and they want to place her in a long term care facility. They are thinking [facility]. Advised family that hospice will follow patient while she is in the hospital but will not admit until she is discharged to [facility] [place]. Family stated understanding.

## 2020-12-06 NOTE — PROGRESS NOTES
Spoke with  Dr Holley Sandhoff during rounds about pt's increased blood glucose. Order received to stop checking pt's glucose as we are focusing on comfort care. Updated as well that last reported BM was Tuesday, small amount of mucousy stool this morning.

## 2020-12-06 NOTE — PROGRESS NOTES
Hospice referral called to [facility].  She will be in contact with the pt's family to set up meeting time

## 2020-12-07 VITALS
WEIGHT: 181.6 LBS | DIASTOLIC BLOOD PRESSURE: 62 MMHG | BODY MASS INDEX: 32.17 KG/M2 | TEMPERATURE: 97.8 F | OXYGEN SATURATION: 98 % | RESPIRATION RATE: 16 BRPM | HEART RATE: 83 BPM | SYSTOLIC BLOOD PRESSURE: 140 MMHG

## 2020-12-07 LAB
GLUCOSE BLD-MCNC: 174 MG/DL (ref 70–108)
GLUCOSE BLD-MCNC: 182 MG/DL (ref 70–108)
SARS-COV-2, NAAT: NOT DETECTED

## 2020-12-07 PROCEDURE — 97530 THERAPEUTIC ACTIVITIES: CPT

## 2020-12-07 PROCEDURE — 2580000003 HC RX 258: Performed by: INTERNAL MEDICINE

## 2020-12-07 PROCEDURE — 99239 HOSP IP/OBS DSCHRG MGMT >30: CPT | Performed by: INTERNAL MEDICINE

## 2020-12-07 PROCEDURE — U0002 COVID-19 LAB TEST NON-CDC: HCPCS

## 2020-12-07 PROCEDURE — 97162 PT EVAL MOD COMPLEX 30 MIN: CPT

## 2020-12-07 PROCEDURE — 6370000000 HC RX 637 (ALT 250 FOR IP): Performed by: INTERNAL MEDICINE

## 2020-12-07 PROCEDURE — 82948 REAGENT STRIP/BLOOD GLUCOSE: CPT

## 2020-12-07 PROCEDURE — 6360000002 HC RX W HCPCS: Performed by: INTERNAL MEDICINE

## 2020-12-07 RX ORDER — DEXAMETHASONE 4 MG/1
4 TABLET ORAL EVERY 6 HOURS
Qty: 12 TABLET | Refills: 0 | DISCHARGE
Start: 2020-12-07 | End: 2020-12-10

## 2020-12-07 RX ADMIN — METOPROLOL SUCCINATE 25 MG: 25 TABLET, FILM COATED, EXTENDED RELEASE ORAL at 08:08

## 2020-12-07 RX ADMIN — DEXAMETHASONE 4 MG: 4 TABLET ORAL at 00:37

## 2020-12-07 RX ADMIN — ANTACID TABLETS 500 MG: 500 TABLET, CHEWABLE ORAL at 08:08

## 2020-12-07 RX ADMIN — DEXAMETHASONE 4 MG: 4 TABLET ORAL at 13:27

## 2020-12-07 RX ADMIN — METFORMIN HYDROCHLORIDE 500 MG: 500 TABLET ORAL at 08:08

## 2020-12-07 RX ADMIN — ACETAMINOPHEN 650 MG: 325 TABLET ORAL at 08:09

## 2020-12-07 RX ADMIN — LEVOTHYROXINE SODIUM 88 MCG: 88 TABLET ORAL at 06:09

## 2020-12-07 RX ADMIN — RAMIPRIL 5 MG: 5 CAPSULE ORAL at 08:08

## 2020-12-07 RX ADMIN — INSULIN LISPRO 2 UNITS: 100 INJECTION, SOLUTION INTRAVENOUS; SUBCUTANEOUS at 11:30

## 2020-12-07 RX ADMIN — Medication 10 ML: at 08:09

## 2020-12-07 RX ADMIN — DEXAMETHASONE 4 MG: 4 TABLET ORAL at 06:09

## 2020-12-07 RX ADMIN — INSULIN LISPRO 2 UNITS: 100 INJECTION, SOLUTION INTRAVENOUS; SUBCUTANEOUS at 16:19

## 2020-12-07 RX ADMIN — SULFAMETHOXAZOLE AND TRIMETHOPRIM 1 TABLET: 400; 80 TABLET ORAL at 08:09

## 2020-12-07 RX ADMIN — DOCUSATE SODIUM 100 MG: 100 CAPSULE, LIQUID FILLED ORAL at 08:09

## 2020-12-07 ASSESSMENT — PAIN SCALES - GENERAL
PAINLEVEL_OUTOF10: 0
PAINLEVEL_OUTOF10: 2
PAINLEVEL_OUTOF10: 0

## 2020-12-07 NOTE — DISCHARGE INSTR - COC
Continuity of Care Form    Patient Name: Rufina Martinez   :  1934  MRN:  [id number]    Admit date:  2020  Discharge date:  2020    Code Status Order: [facility]   Advance Directives:   Advance Care Flowsheet Documentation       Date/Time Healthcare Directive Type of Healthcare Directive Copy in [address] Agent's Name Healthcare Agent's Phone Number    20   Yes, patient has an advance directive for healthcare treatment  Durable power of  for health care  Yes, copy in chart  Healthcare power of   Janneth Barros  --            Admitting Physician:  Rosaura Guerrero DO  PCP: Pop Dewey MD    Discharging Nurse: Ian CAZARES, [facility] Unit/Room#: 4A-06/006-A  Discharging Unit Phone Number: [phone]    Emergency Contact:   Extended Emergency Contact Information  Primary Emergency Contact: [address] [place] [address]  [place] Phone: [phone]  Relation: Other  Secondary Emergency Contact: [address] [facility]  [place] Phone: [phone]  Relation: Other    Past Surgical History:  Past Surgical History:   Procedure Laterality Date    BREAST SURGERY Left     lumpectomy    EYE SURGERY  2006    cataracts   [address] [place] [address] N/A 2020    KYPHOPLASTY T2 WITH BIOPSY AND POSS OSTEOCOOL performed by Britney Shukla MD at [place] [address] Left 2016    Re-excision deep margins left breast    TONSILLECTOMY      as a child       Immunization History:   Immunization History   Administered Date(s) Administered    Influenza Vaccine, unspecified formulation 10/01/2016    Pneumococcal Conjugate 13-valent ([place]) 10/01/2016       Active Problems:  Patient Active Problem List   Diagnosis Code    Carcinoma of left breast upper inner quadrant ([facility] C50.212    Type 2 diabetes mellitus without complication ([address] Q09.4    Uncontrolled hypertension I10  Acquired hypothyroidism E03.9    S/P lumpectomy and sln bx, left breast Z98.890    Intractable pain R52    Malignant neoplasm of upper lobe of lung (HCC) C34.10    Compression fracture of T2 vertebra (HCC) S22.020A    Pathologic compression fracture of thoracic vertebra (HCC) M48.54XA    Pneumothorax, right J93.9    Head trauma S09.90XA    Closed fracture of second thoracic vertebra ([address] S25.12A    Mass of left lung R91.8    Uncontrolled type 2 diabetes mellitus with hyperglycemia (HCC) E11.65    History of breast cancer Z85.3    Urinary incontinence R32    Hyperlipidemia E78.5    Primary cancer of left breast with metastasis to other site [facility]) C50.912       Isolation/Infection:   Isolation            No Isolation          Patient Infection Status       None to display            Nurse Assessment:  Last Vital Signs: BP (!) 140/62   Pulse 83   Temp 97.8 °F (36.6 °C) (Oral)   Resp 16   Wt 181 lb 9.6 oz (82.4 kg)   SpO2 98%   BMI 32.17 kg/m²     Last documented pain score (0-10 scale): Pain Level: 0  Last Weight:   Wt Readings from Last 1 Encounters:   12/07/20 181 lb 9.6 oz (82.4 kg)     Mental Status:  disoriented    IV Access:  - None    Nursing Mobility/ADLs:  Walking   Dependent  Transfer  Dependent  Bathing  Dependent  Dressing  Dependent  Toileting  Dependent  Feeding  Independent  Med Admin  Assisted  Med Delivery   whole    Wound Care Documentation and Therapy:  Wound 12/05/20 Arm Outer;Left (Active)   Wound Etiology Skin Tear 12/07/20 0322   Dressing Status New dressing applied;Clean;Dry; Intact 12/05/20 2043   Wound Cleansed Wound cleanser 12/05/20 2043   Dressing/Treatment Open to air 12/07/20 1133   Wound Assessment Other (Comment) 12/07/20 1133   Drainage Amount None 12/07/20 1133   Drainage Description Sanguinous;Clear 12/06/20 0509   Odor None 12/07/20 1133   Alcira-wound Assessment Blanchable erythema 12/07/20 0322   Number of days: 1        Elimination:  Continence:   · Bowel: No  · Bladder: No  Urinary Catheter: Removal Date 12/6/202   Colostomy/Ileostomy/Ileal Conduit: No       Date of Last BM: 12/6/2020    Intake/Output Summary (Last 24 hours) at 12/7/2020 1324  Last data filed at 12/6/2020 2039  Gross per 24 hour   Intake 10 ml   Output --   Net 10 ml     I/O last 3 completed shifts: In: 310 [P.O.:300; I.V.:10]  Out: -     Safety Concerns:     Sundowners [place], History of Falls (last 30 days) and At Risk for Falls    Impairments/Disabilities:      Vision and Hearing    Nutrition Therapy:  Current Nutrition Therapy:   - Oral Diet:  General    Routes of Feeding: Oral  Liquids: Thin Liquids  Daily Fluid Restriction: no  Last Modified Barium Swallow with Video (Video Swallowing Test): not done    Treatments at the Time of Hospital Discharge:   Respiratory Treatments: none  Oxygen Therapy:  is on oxygen at 1-2 L/min per nasal cannula. Ventilator:    - No ventilator support    Rehab Therapies: Physical Therapy and Occupational Therapy  Weight Bearing Status/Restrictions: No weight bearing restirctions  Other Medical Equipment (for information only, NOT a DME order):  wheelchair and walker  Other Treatments: ***    Patient's personal belongings (please select all that are sent with patient):  None    RN SIGNATURE:  Electronically signed by Obdulio Petit on 12/7/20 at 2:21 PM EST    CASE MANAGEMENT/SOCIAL WORK SECTION    Inpatient Status Date: 12/5/2020    Readmission Risk Assessment Score:  Readmission Risk              Risk of Unplanned Readmission:        12           Discharging to Facility/ Agency   · Name: [facility]  · [address] [place] [address]  · Phone:[phone]  · [address] [place] [address] (if applicable)   · Name:  · Address:  · Dialysis Schedule:  · Phone:  · Fax:    / signature: Electronically signed by Miriam Judd.  SAKINA Esquivel on 12/7/20 at 1:57 PM EST    PHYSICIAN

## 2020-12-07 NOTE — FLOWSHEET NOTE
12/07/20 1525   Encounter Summary   Services provided to: Patient   Referral/Consult From: Rounding   Continue Visiting Yes  (12/7)   Complexity of Encounter Low   Length of Encounter 15 minutes   Spiritual Assessment Completed Yes   Advance Care Planning Yes   Routine   Type Initial   Assessment Calm; Approachable   Intervention Active listening;Nurtured hope;Prayer   Outcome Comfort;Expressed gratitude   Assessment: In my encounter with the 80 yr old patient, while rounding  the unit 4A,  I provided spiritual care to patient through conversation, I also came to assess the patients spiritual needs present and to verify the pts Advance Care planning. The pt was admitted due to cancer of the left breast.     Interventions:  I provided prayer, emotional support and words of comfort. I did verify that the pt does have a Advance Directive. Outcomes: The patient was encouraged and didnt share any further spiritual needs at this time. The pt remains optimistic and hopeful. The pt shared that they were appreciative for the support. Plan:  1. Chaplains will follow-up at a later time for assessment of any spiritual care needs present.         2.   The Chaplains will be available to provide further emotional support per request.

## 2020-12-07 NOTE — PROGRESS NOTES
[facility]  INPATIENT PHYSICAL THERAPY  EVALUATION  [facility] 4A - 4A-06/006-A    Time In: 7026  Time Out: 1503  Timed Code Treatment Minutes: 20 Minutes  Minutes: 32          Date: 2020  Patient Name: Kizzy Ordonez,  Gender:  female        MRN: [id number]  : 1934  (80 y.o.)      Referring Practitioner: Christoph Degroot DO  Diagnosis: primary cancer of left breast metastasis to other site  Additional Pertinent Hx: Per EMR: \"Patient is a 63-year-old female from [address] [place] [address] with Dr. Dylan Thurman transferring. Patient has a history of breast cancer treated with lumpectomy and radiation a few years ago. She was a found to have a 5 x 4 cm mass on chest x-ray at an earlier visit and was discussed not to do any further treatment at this time and even discussed follow-up outpatient hospice in a few weeks to determine eligibility, the family has an appointment this Monday with hospice. Patient presented today because family noticed her falling more, confusion, and and hallucinations at times. CT head showed a left parietal mass with edema. CT spine showed a potential lumbar fracture with endplate deformity of L1. She is alert and oriented and appropriate but confused at times. Patient's past medical history includes spinal stenosis hypothyroidism, hypertension, hyperlipidemia, diabetes, depression, breast cancer\"     Restrictions/Precautions:  Restrictions/Precautions: Fall Risk  Position Activity Restriction  Spinal Precautions: No Bending, No Lifting, No Twisting  Spinal Precautions: log rolling  Other position/activity restrictions: L1 endplate deformity    Subjective:  Chart Reviewed: Yes  Patient assessed for rehabilitation services?: Yes  Family / Caregiver Present: Yes  Subjective: RN approved PT evaluation. Pt in supine upon entry and was agreeable to PT interventions. Pt in bedside chair with all needs in reach. Chair alarm on. RN aware. Dtr present in room.      General:  Overall Orientation Status: Impaired  Orientation Level: Oriented to person, Disoriented to place, Oriented to time, Disoriented to situation  Follows Commands: Within Functional Limits    Vision: Within Functional Limits    Hearing: Exceptions to [facility]  Hearing Exceptions: Hard of hearing/hearing concerns         Pain: back pain, value not rated   Mild grimacing with exercises, none reported throughout gait    Social/Functional History:    Lives With: Alone  Type of Home: House  Home Layout: One level  Home Equipment: 4 wheeled walker          Receives Help From: Family        Ambulation Assistance: Independent  Transfer Assistance: Independent    Active : Yes     Additional Comments: Pt with 1 fall in the past month. Pt using cane and walker at home PTA. OBJECTIVE:  Range of Motion:  Bilateral Lower Extremity: WFL    Strength:  Bilateral Lower Extremity: Impaired - grossly deconditioned   Not formally tested    Balance:  Static Standing Balance: Minimal Assistance, Moderate Assistance  Dynamic Standing Balance: Minimal Assistance, Moderate Assistance    Bed Mobility:  Supine to Sit: Contact Guard Assistance, with head of bed raised, with verbal cues , with increased time for completion    Transfers:  Sit to Stand: Moderate Assistance, with increased time for completion, cues for hand placement, to/from chair with arms  Stand to [address] [facility], with increased time for completion, cues for hand placement, to/from chair with arms    Ambulation:  Moderate Assistance, with cues for safety, with verbal cues , with increased time for completion  Distance: 3  Surface: Level Tile  Device:Rolling Walker  Gait Deviations: Forward Flexed Posture, Slow Jada, Decreased Step Length Bilaterally, Decreased Weight Shift Bilaterally and heavy lean to the left, decreased foot clearance, posterior lean, unsteady    Exercise:  Patient was guided in 1 set(s) 10 reps of exercise to both lower extremities.   Ankle pumps, Quad sets and Heelslides. Exercises were completed for increased independence with functional mobility. Pt reports back pain with glut sets. Functional Outcome Measures: Completed  AM-PAC Inpatient Mobility without Stair Climbing Raw Score : 11  AM-PAC Inpatient without Stair Climbing T-Scale Score : 35.66    ASSESSMENT:  Activity Tolerance:  Patient tolerance of  treatment: good. Pt tolerated short bout of HEP and transfers well this date, no pain verbalized with mobility. Treatment Initiated: Treatment and education initiated within context of evaluation. Evaluation time included review of current medical information, gathering information related to past medical, social and functional history, completion of standardized testing, formal and informal observation of tasks, assessment of data and development of plan of care and goals. Treatment time included skilled education and facilitation of tasks to increase safety and independence with functional mobility for improved independence and quality of life. HEP initiated. Pt encouraged to sit up in bedside chair, discussed the importance of mobility. Pt is pleasantly confused throughout session with decreased insight to deficits. Pt re-oriented throughout session. Assessment:   Body structures, Functions, Activity limitations: Decreased functional mobility , Decreased strength, Decreased safe awareness, Increased pain, Decreased balance, Decreased endurance, Decreased posture         Discharge Recommendations:  Discharge Recommendations: [address]    Patient Education:  PT Education: Goals, PT Role, Plan of Care    Equipment Recommendations:defer to next level of care       Plan:  Times per week: 1-3xGM  Current Treatment Recommendations: Strengthening, Patient/Caregiver Education & Training, Gait Training, Balance Training, Functional Mobility Training, Endurance Training, Transfer Training, Safety Education & Training, Home Exercise Program, Neuromuscular Re-education, Equipment Evaluation, Education, & procurement    Goals:  Patient goals : go home  Short term goals  Time Frame for Short term goals: by discharge  Short term goal 1: bed mobility with supervision A for ease of getting in/out of bed  Short term goal 2: sit <> stand with supervision A for ease of transfers  Short term goal 3: ambulate > 50ft with use of LRAD and contact guard assits for safe mobility in room/home  Long term goals  Time Frame for Long term goals : N/A secondary to short ELOS    Following session, patient left in safe position with all fall risk precautions in place.

## 2020-12-07 NOTE — PROGRESS NOTES
[address] [place] [address]  PHYSICAL THERAPY MISSED TREATMENT NOTE  ACUTE CARE  [facility] 4A              Missed Treatment  Holding PT evaluation at this time. Per case mgmt plan is to have a family meeting with hospice this date. Will follow up tomorrow and determine if pt is appropriate for skilled interventions vs hospice.

## 2020-12-07 NOTE — CARE COORDINATION
12/7/20, 1:58 PM EST    Patient goals/plan/ treatment preferences discussed by  and . Patient goals/plan/ treatment preferences reviewed with patient/ family. Patient/ family verbalize understanding of discharge plan and are in agreement with goal/plan/treatment preferences. Understanding was demonstrated using the teach back method. AVS provided by RN at time of discharge, which includes all necessary medical information pertaining to the patients current course of illness, treatment, post-discharge goals of care, and treatment preferences. Services After Discharge  Services At/After Discharge: Nursing Services, OT, PT, Aide Services, In ambulance([facility]/ ambulance)   IMM Letter  IMM Letter given to Patient/Family/Significant other/Guardian/POA/by[de-identified] staff  IMM Letter date given[de-identified] 12/05/20  Janell Prabhakar has been accepted to [facility]. She will be skilled at the facility under her Medicare benefit. She will be transported by ambulance. Discharge instructions and transport forms are attached to patients blue discharge packet. Spoke with patients daughter Johanny Kohler and she is agreeable to discharge plan.   Tash in admissions at the facility is aware transport is set up for 4:30pm.

## 2020-12-07 NOTE — PROGRESS NOTES
Patient discharging to [facility]. AVS with LAURIE and last dose MAR printed and faxed. Copies placed in transportation packet. IV removed per policy with tip intact. All personal belongings gathered. Report called to facility. Patient waits for transportation.

## 2020-12-07 NOTE — PROGRESS NOTES
Call to daughter to see if questions about hospice care, denied questions. Discussed with her about cost of room and board at [facility] with hospice and difference with skilled. Daughter voiced that she was unaware of this and more than likely will wish for therapies to see if able to improve strength and for medicare to pay for room and board. Then will transition to hospice if patient declining with inability to do therapies or therapy impending quality of days. Updated primary nurse Maia Mayers of conversation with daughter and Nate Bradely.

## 2020-12-07 NOTE — CARE COORDINATION
DISCHARGE/PLANNING EVALUATION  12/7/20, 10:07 AM EST    Reason for Referral: wants hospice. Mental Status: Did not assess, spoke with daughter Aurora Alexander. Decision Making: Family is helping with decision making. Daughter Aurora Alexander is her POA. Family/Social/Home Environment: She was living at home alone. Her daughter-in-law was coming to the home twice a day to give her her medications. She has a very supportive family. Current Services including food security, transportation and housekeeping: Cassidy Evans did not have any current home health. Family was making sure all of her needs have been met. Current Equipment:rolling walker and cane  Payment Source:Medicare  Concerns or Barriers to Discharge: None  Post acute provider list with quality measures, geographic area and applicable managed care information provided. Questions regarding selection process answered: Family has picked their top three choices for ECF. Teach Back Method used with Aria's daughter Aurora Alexander regarding care plan and discharge planning. Patient's daughter Aurora Alexander verbalized understanding of the plan of care and contribute to goal setting. Patient goals, treatment preferences and discharge plan: Aurora Alexander would like a referral made to [facility]. She would like the patient to go there under her Medicare benefit. SW made referral to [facility] at the facility. If they are unable to accept their next choices are [address] and [address]. Electronically signed by SAKINA Valle on 12/7/2020 at 10:07 AM

## 2020-12-07 NOTE — PROGRESS NOTES
CLINICAL PHARMACY: DISCHARGE MED RECONCILIATION/REVIEW    [facility] ([facility]) Select Patient?: No  Total # of Interventions Recommended: 0   -   Total # Interventions Accepted: 0  Intervention Severity:   - Level 1 Intervention Present?: No   - Level 2 #: 0   - Level 3 #: 0   Time Spent (min): 15    Additional Documentation:    Tanner BledsoeD, BCPS  12/7/2020  1:58 PM

## 2020-12-07 NOTE — PROGRESS NOTES
Physician Progress Note      Jaci Lou  CSN #:                  [id number]  :                       1934  ADMIT DATE:       2020 4:51 PM  [address] [place] DATE:  RESPONDING  PROVIDER #:        Yarelis GROVER DO          QUERY TEXT:    Pt admitted with confusion and noted to have brain metastasis. If possible,   please document in the progress notes and discharge summary if you are   evaluating and / or treating any of the following: The medical record reflects the following:  Risk Factors: brain and lung metastasis  Clinical Indicators: AMS, disoriented to time, place, situation  Treatment: Labs, monitoring, hospice meeting  Options provided:  -- Metabolic encephalopathy  -- Toxic encephalopathy  -- Toxic metabolic encephalopathy  -- Delirium due to, Please specify cause  -- Delirium  -- Other - I will add my own diagnosis  -- Disagree - Not applicable / Not valid  -- Disagree - Clinically unable to determine / Unknown  -- Refer to Clinical Documentation Reviewer    PROVIDER RESPONSE TEXT:    Brain metastases with confusion    Query created by: Ramintariq Burgos on 2020 11:55 AM      QUERY TEXT:    Pt admitted with confusion and noted to have brain metastasis. If possible,   please document in progress notes and discharge summary the relationship, if   any, between confusion and brain metastasis.     The medical record reflects the following:  Risk Factors: brain metastasis  Clinical Indicators: AMS, disoriented to time, place, situation, per family   brought in d/t falling more, confusion, and hallucinations at times  Treatment: Supportive, labs, hospice consult  Options provided:  -- Confusion due to brain metastasis  -- Confusion unrelated to brain metastasis  -- Other - I will add my own diagnosis  -- Disagree - Not applicable / Not valid  -- Disagree - Clinically unable to determine / Unknown  -- Refer to Clinical Documentation Reviewer    PROVIDER RESPONSE TEXT:    This patient has confusion due to brain metastasis.     Query created by: Teena Campa on 12/7/2020 11:59 AM      Electronically signed by:  Bryan Brown DO 12/7/2020 1:25 PM

## 2020-12-07 NOTE — PLAN OF CARE
Problem: Pain:  Goal: Pain level will decrease  Description: Pain level will decrease  Outcome: Ongoing  Goal: Control of acute pain  Description: Control of acute pain  Outcome: Ongoing  Goal: Control of chronic pain  Description: Control of chronic pain  Outcome: Ongoing     Problem: Falls - Risk of:  Goal: Will remain free from falls  Description: Will remain free from falls  Outcome: Ongoing  Goal: Absence of physical injury  Description: Absence of physical injury  Outcome: Ongoing     Problem: Skin Integrity:  Goal: Will show no infection signs and symptoms  Description: Will show no infection signs and symptoms  Outcome: Ongoing  Goal: Absence of new skin breakdown  Description: Absence of new skin breakdown  Outcome: Ongoing    Care plan reviewed with patient & family patient & family  verbalized understanding of the plan of care and contributed to goal setting.

## 2021-01-09 ENCOUNTER — HOSPITAL ENCOUNTER (EMERGENCY)
Age: 86
Discharge: HOME | End: 2021-01-09
Attending: EMERGENCY MEDICINE
Payer: MEDICARE

## 2021-01-09 ENCOUNTER — APPOINTMENT (OUTPATIENT)
Dept: CT IMAGING | Age: 86
End: 2021-01-09
Payer: MEDICARE

## 2021-01-09 VITALS
SYSTOLIC BLOOD PRESSURE: 151 MMHG | DIASTOLIC BLOOD PRESSURE: 68 MMHG | HEART RATE: 76 BPM | OXYGEN SATURATION: 96 % | RESPIRATION RATE: 18 BRPM | TEMPERATURE: 97.8 F

## 2021-01-09 DIAGNOSIS — S00.81XA ABRASION OF FACE, INITIAL ENCOUNTER: Primary | ICD-10-CM

## 2021-01-09 PROCEDURE — 70486 CT MAXILLOFACIAL W/O DYE: CPT

## 2021-01-09 PROCEDURE — 99284 EMERGENCY DEPT VISIT MOD MDM: CPT

## 2021-01-09 PROCEDURE — 70450 CT HEAD/BRAIN W/O DYE: CPT

## 2021-01-09 PROCEDURE — 72125 CT NECK SPINE W/O DYE: CPT

## 2021-01-09 NOTE — ED PROVIDER NOTES
[facility] EMERGENCY DEPT      CHIEF COMPLAINT       Chief Complaint   Patient presents with    Fall       Nurses Notes reviewed and I agree except as noted in the HPI. HISTORY OF PRESENT ILLNESS    Ej Jose is a 80 y.o. female who presents complaint of mechanical fall, patient slid off of her bed face first onto the ground. Patient said that she has pain to right side of her face, otherwise denies any new pain, including her neck. Patient had no loss of consciousness. She has known history of metastatic lung cancer. Onset: Acute  Duration: Prior to arrival  Timing: Constant  Location of Pain: Right face  Intesity/severity: Mild  Modifying Factors: Recent fall  Relieved by;  Previous Episodes; Tx Before arrival: None takes tramadol chronically for cancer pain and an old lumbar spine compression fracture. REVIEW OF SYSTEMS      Review of Systems   Constitutional: Negative for fever, chills, diaphoresis and fatigue. HENT: Negative for congestion, drooling, facial swelling and sore throat. Positive for facial abrasion/pain from a fall. Eyes: Negative for photophobia, pain and discharge. Respiratory: Negative for cough, shortness of breath, wheezing and stridor. Positive for history of lung cancer. Cardiovascular: Negative for chest pain, palpitations and leg swelling. Gastrointestinal: Negative for abdominal pain, blood in stool and abdominal distention. Endocrine: Negative for cold intolerance, heat intolerance, polydipsia and polyuria. Genitourinary: Negative for dysuria, urgency, hematuria and difficulty urinating. Musculoskeletal: Negative for gait problem, neck pain and neck stiffness. Skin; No rash, No itching  Neurological: Negative for seizures, weakness and numbness. History of metastatic brain cancer. Psychiatric/Behavioral: Negative for hallucinations, confusion and agitation.      PAST MEDICAL HISTORY    has a past medical history of Arthritis, Cancer ([facility] [address] Depression, Diabetes ([facility] [address] Hyperlipidemia, Hypertension, Hypothyroidism, PONV (postoperative nausea and vomiting), and Spinal stenosis. SURGICAL HISTORY      has a past surgical history that includes Hysterectomy (1980); Tonsillectomy; eye surgery (2006); Breast surgery (Left); other surgical history (Left, 11/29/2016); and Kyphosis surgery (N/A, 1/7/2020). CURRENT MEDICATIONS       Discharge Medication List as of 1/9/2021  3:43 PM      CONTINUE these medications which have NOT CHANGED    Details   docusate sodium (COLACE, DULCOLAX) 100 MG CAPS Take 100 mg by mouth 2 times daily, Disp-60 capsule, R-0Normal      lidocaine 4 % external patch Place 1 patch onto the skin daily Apply to back, Transdermal, DAILY Starting Fri 1/10/2020, Disp-30 patch, R-0, Normal      insulin lispro (HUMALOG) 100 UNIT/ML injection vial Inject 0-12 Units into the skin 3 times daily (with meals), Disp-1 vial, R-3Normal      metFORMIN (GLUCOPHAGE) 500 MG tablet Take 1 tablet by mouth 2 times daily (with meals), Disp-60 tablet, R-3Normal      metoprolol succinate (TOPROL XL) 25 MG extended release tablet Take 25 mg by mouth daily Historical Med      aspirin 81 MG tablet Take 81 mg by mouth dailyHistorical Med      desonide (DESOWEN) 0.05 % cream Apply topically 2 times daily Apply topically 2 times daily. , Topical, 2 TIMES DAILY, Historical Med      acetaminophen (TYLENOL) 325 MG tablet Take 650 mg by mouth as needed Historical Med      ramipril (ALTACE) 2.5 MG capsule Take 5 mg by mouth daily Historical Med      venlafaxine (EFFEXOR) 37.5 MG tablet Take 75 mg by mouth daily Historical Med      Multiple Vitamins-Minerals (MULTIVITAMIN PO) Take by mouth daily      Multiple Vitamins-Minerals (PRESERVISION AREDS PO) Take 1 capsule by mouth 2 times daily      levothyroxine (SYNTHROID) 88 MCG tablet Take 75 mcg by mouth Daily Historical Med      amitriptyline (ELAVIL) 10 MG tablet Take 25 mg by mouth nightly Historical Med      pravastatin (PRAVACHOL) 20 MG tablet Take 20 mg by mouth daily      calcium carbonate 600 MG TABS tablet Take 1 tablet by mouth 2 times daily Historical Med             ALLERGIES     has No Known Allergies. FAMILY HISTORY     She indicated that her mother is . She indicated that her father is . She indicated that the status of her paternal grandfather is unknown.   family history includes Cancer in her father and paternal grandfather. SOCIAL HISTORY      reports that she has never smoked. She has never used smokeless tobacco. She reports that she does not drink alcohol or use drugs. PHYSICAL EXAM     INITIAL VITALS:  oral temperature is 97.8 °F (36.6 °C). Her blood pressure is 151/68 (abnormal) and her pulse is 76. Her respiration is 18 and oxygen saturation is 96%. Physical Exam   Constitutional:  well-developed and well-nourished. HENT: Head: Normocephalic, atraumatic, Bilateral external ears normal, Oropharynx mosit, No oral exudates, Nose normal. Abrasion of her face, mostly about the left eye, right nasal bridge and right cheek. Eyes: PERRL, EOMI, Conjunctiva normal, No discharge. No scleral icterus  Neck: Normal range of motion, No tenderness, Supple  Cardiovascular: Normal rate, regular rhythm, S1 normal and S2 normal.  Exam reveals no gallop. Pulmonary/Chest: Effort normal and breath sounds normal. No accessory muscle usage or stridor. No respiratory distress. no wheezes. has no rales. exhibits no tenderness. Abdominal: Soft. Bowel sounds are increased. Exhibits no distension. There is no tenderness. There is no rebound and no guarding. Extremities: No edema, no tenderness, no cyanosis, no clubbing. Musculoskeletal: Good range of motion in major joints is observed. No major deformities noted. Neurological: Alert and oriented ×3, normal motor function, normal sensory function, no focal deficits. GCS 15  Skin: Skin is warm, dry and intact. No rash noted. No erythema.    Psychiatric: Affect normal, judgment normal, mood normal.  DIFFERENTIAL DIAGNOSIS:       DIAGNOSTIC RESULTS     EKG: All EKG's are interpreted by the Emergency Department Physician who either signs or Co-signs this chart in the absence of a cardiologist.      RADIOLOGY: non-plain film images(s) such as CT, Ultrasound and MRI are read by the radiologist.  Plain radiographic images are visualized and preliminarily interpreted by the emergency physician unless otherwise stated below. LABS:   Labs Reviewed - No data to display    EMERGENCY DEPARTMENT COURSE:   Vitals:    Vitals:    01/09/21 1433 01/09/21 1600   BP: (!) 142/113 (!) 151/68   Pulse: 80 76   Resp: 18 18   Temp: 97.8 °F (36.6 °C)    TempSrc: Oral    SpO2: 99% 96%     Presenting with complaint of facial abrasion status post mechanical fall from a bed. Patient denies any focal pain except for her right face, CT head neck and face are negative. We will discharge patient back to the nursing home. CRITICAL CARE:       CONSULTS:  None    PROCEDURES:  None    FINAL IMPRESSION      1.  Abrasion of face, initial encounter          DISPOSITION/PLAN   Decision To Discharge    PATIENT REFERRED TO:  Margarita Verma MD  [address]  [phone]    Call   As needed      DISCHARGE MEDICATIONS:  Discharge Medication List as of 1/9/2021  3:43 PM          (Please note that portions of this note were completed with a voice recognition program.  Efforts were made to edit the dictations but occasionally words are mis-transcribed.)    Chante Cheung, [facility],   01/09/21 7536

## 2021-01-09 NOTE — ED TRIAGE NOTES
Pt presents to ER via squad from [facility] for a fall around 1330. Pt slid out of bed and fell head first onto the floor. Pt was confused shortly after but never lost consciousness. Denies any pain. Pt is alert to person, place, and somewhat situation but is disoriented to time. Bruising noted to several areas of her face, bleeding controlled.

## 2021-03-02 NOTE — DISCHARGE SUMMARY
Discharge Summary     Patient Identification:  Brittney Esparza  : 1934  MRN: [id number]   Account: [de-identified]     Admit date: 2020  Discharge date: 2020   Attending provider: No att. providers found        Primary care provider: Vilma Riddle MD     Discharge Diagnoses:   Principal Problem:    Primary cancer of left breast with metastasis to other site [facility])  Resolved Problems:    * No resolved hospital problems. *     From prior note: \"Assessment/Plan:     1. Metastatic breast cancer: to chest and brain; pt meeting with hospice and is [facility]  20: pt told 6-8month expectancy 11months ago  2. Back pain with lumbar fx: sent here for possible kyphoplasy,which is an outpatient procedure  20: pt denies pain, family claims it; PT eval and follow  3. Essential HTN: c/w home meds and monitor  20: as above  4. DM2: stop SSI and glucose checks as she is [facility] meeting with hospice        Expected discharge date:       Disposition:      []?  Home                             []? TCU                             []? Rehab                             []? Psych                             [x]? SNF                             []? [facility]                             []? Other-     Chief Complaint: Fall, confusion     Hospital Treatment Course: (Prior to today) \"Patient is a 80-year-old female from [address] with Dr. Rob Bush transferringTyree Payne has a history of breast cancer treated with lumpectomy and radiation a few years ago. Danni Pulido was a found to have a 5 x 4 cm mass on chest x-ray at an earlier visit and was discussed not to do any further treatment at this time and even discussed follow-up outpatient hospice in a few weeks to determine eligibility, the family has an appointment this Monday with hospice.  Patient presented today because family noticed her falling more, confusion, and and hallucinations at times.  CT head showed a left parietal mass with edema.  CT spine showed a potential lumbar fracture with endplate deformity of L1.  She is alert and oriented and appropriate but confused at times.  Patient's past medical history includes spinal stenosis hypothyroidism, hypertension, hyperlipidemia, diabetes, depression, breast cancer\"     12/6/20: await hospice plan and need SW to help with placement      Subjective (past 24 hours): denies back pain (including when sitting up or standing) when seen, family at bedside says she does have it when standing \"            Discharge Medications:   Myke Cooney   Home Medication Instructions FYA:[id number]    Printed on:03/02/21 0743   Medication Information                      acetaminophen (TYLENOL) 325 MG tablet  Take 650 mg by mouth as needed              amitriptyline (ELAVIL) 10 MG tablet  Take 25 mg by mouth nightly              aspirin 81 MG tablet  Take 81 mg by mouth daily             calcium carbonate 600 MG TABS tablet  Take 1 tablet by mouth 2 times daily              desonide (DESOWEN) 0.05 % cream  Apply topically 2 times daily Apply topically 2 times daily.              docusate sodium (COLACE, DULCOLAX) 100 MG CAPS  Take 100 mg by mouth 2 times daily             insulin lispro (HUMALOG) 100 UNIT/ML injection vial  Inject 0-12 Units into the skin 3 times daily (with meals)             levothyroxine (SYNTHROID) 88 MCG tablet  Take 75 mcg by mouth Daily              lidocaine 4 % external patch  Place 1 patch onto the skin daily Apply to back             metFORMIN (GLUCOPHAGE) 500 MG tablet  Take 1 tablet by mouth 2 times daily (with meals)             metoprolol succinate (TOPROL XL) 25 MG extended release tablet  Take 25 mg by mouth daily              Multiple Vitamins-Minerals (MULTIVITAMIN PO)  Take by mouth daily             Multiple Vitamins-Minerals (PRESERVISION AREDS PO)  Take 1 capsule by mouth 2 times daily             pravastatin (PRAVACHOL) 20 MG tablet  Take 20 mg by mouth daily             ramipril (ALTACE) 2.5 MG capsule  Take 5 mg by mouth daily              venlafaxine (EFFEXOR) 37.5 MG tablet  Take 75 mg by mouth daily                  Patient Instructions:    Discharge lab work: Activity: activity as tolerated  Diet: No diet orders on file    Code Status: Prior    Follow-up visits:   [facility] [id number]  [phone]           Procedures:     Consults:   IP CONSULT TO HOSPICE  IP CONSULT TO SOCIAL WORK  IP CONSULT TO SOCIAL WORK    Examination:  Vitals:  Vitals:    12/07/20 0035 12/07/20 0322 12/07/20 0757 12/07/20 1133   BP: (!) 144/68 (!) 141/87 (!) 145/63 (!) 140/62   Pulse: 71 76 72 83   Resp: 16 18 18 16   Temp:  97.6 °F (36.4 °C) 98.2 °F (36.8 °C) 97.8 °F (36.6 °C)   TempSrc:  Oral Oral Oral   SpO2: 96% 97% 98% 98%   Weight:  181 lb 9.6 oz (82.4 kg)       Weight: Weight: 181 lb 9.6 oz (82.4 kg)     24 hour intake/output:No intake or output data in the 24 hours ending 03/02/21 0743    General appearance:  No apparent distress, appears stated age and cooperative. HEENT: Pupils equal, round, and reactive to light. Conjunctivae/corneas clear. Neck: Supple, with full range of motion. No jugular venous distention. Trachea midline. Respiratory:  Normal respiratory effort. Clear to auscultation, bilaterally without Rales/Wheezes/Rhonchi. Cardiovascular: Regular rate and rhythm with normal S1/S2 without murmurs, rubs or gallops. Abdomen: Soft, non-tender, non-distended with normal bowel sounds. Musculoskeletal: passive and active ROM x 4 extremities. Skin: Skin color, texture, turgor normal.  No rashes or lesions. Neurologic:  Neurovascularly intact without any focal sensory/motor deficits.  Cranial nerves: II-XII intact, grossly non-focal.  Psychiatric: Alert and oriented, thought content appropriate, normal insight  Capillary Refill: Brisk,< 3 seconds   Peripheral Pulses: +2 palpable, equal bilaterally   Significant Diagnostics:   Radiology: Ct Head Wo Contrast    Result Date: 1/9/2021  PROCEDURE: NONCONTRAST CT BRAIN CLINICAL INFORMATION: fall, hit head and face bruising to nose and eyes . History left breast cancer. COMPARISON: 1/3/2020 TECHNIQUE: Multiple axial 5 mm images of the brain were obtained without administration of intravenous contrast material. ALL CT SCANS AT THIS FACILITY use dose modulation, iterative reconstruction, and/or weight-based dosing when appropriate to reduce radiation dose to as low as reasonably achievable. FINDINGS: Lateral, third, fourth ventricles: There is mild mass effect upon the superior aspect of the left lateral ventricle posteriorly due to adjacent brain edema. The other ventricles are unremarkable. Parenchyma: There is a relatively large area of diminished attenuation/edema that has developed in the left parietal lobe since prior study in addition to a small area of diminished attenuation in the right cerebellar hemisphere, likely representing edema from metastatic disease Mastoid processes: Well aerated. Normal in appearance. .   Paranasal sinuses/calvarium: Unremarkable     Abnormal foci of edema in the left posterior parietal lobe and in the right cerebral hemisphere, likely representing edema from metastatic disease. . **This report has been created using voice recognition software. It may contain minor errors which are inherent in voice recognition technology. ** Final report electronically signed by Dr. Erickson Both on 1/9/2021 3:22 PM    Ct Facial Bones Wo Contrast    Result Date: 1/9/2021  CT FACIAL BONES WITHOUT CONTRAST ENHANCEMENT: CLINICAL INFORMATION: Trauma fall. Facial bruising. COMPARISON: No prior study. TECHNIQUE: Multiple axial 3 mm images of the facial bones were obtained without administration of intravenous contrast material. Computer generated coronal images of the facial bones were also reconstructed. ALL CT SCANS AT THIS FACILITY use dose modulation, iterative reconstruction, and/or weight-based dosing when appropriate to reduce radiation dose to as low as reasonably achievable. FINDINGS: No facial bone fractures seen. The orbital rims and orbital floors are intact. The paranasal sinuses are well aerated. . There is mild mucosal thickening in both maxillary sinuses, likely chronic. There is mild soft tissue swelling along left side of the nose and anterior to left maxillary sinus. Facial soft tissue swelling left side. No acute facial fracture seen. **This report has been created using voice recognition software. It may contain minor errors which are inherent in voice recognition technology. ** Final report electronically signed by Dr. Julio Shrestha on 1/9/2021 3:23 PM    Ct Cervical Spine Wo Contrast    Result Date: 1/9/2021  PROCEDURE: CT CERVICAL SPINE WO CONTRAST CLINICAL INFORMATION: fall from bed COMPARISON: 1/3/2020 TECHNIQUE: Multiple axial 3 mm images of the entire cervical spine were obtained without the administration of intravenous contrast material.. Computer generated sagittal images of the cervical spine were reconstructed. ALL CT SCANS AT THIS FACILITY use dose modulation, iterative reconstruction, and/or weight-based dosing when appropriate to reduce radiation dose to as low as reasonably achievable. FINDINGS: There is an old mild compression fracture T2 with evidence for prior vertebroplasty. Period is also moderate disc space narrowing C4-5, C5-6, C6-7, with associated degenerative disc disease in addition to mild antegrade spondylolisthesis C7 upon T1, 2.5 mm. No acute fractures seen. Moderate vertebral body spondylosis is present lower cervical spine and there is multilevel moderately severe degenerative facet arthropathy bilaterally. 1. Multifocal degenerative changes, as described. Old compression fracture T2 with prior vertebroplasty. 2. No acute fracture seen. **This report has been created using voice recognition software.   It may contain minor errors which are inherent in voice recognition technology. ** Final report electronically signed by Dr. Dashawn Dewitt on 1/9/2021 3:27 PM      Labs: No results found for this or any previous visit (from the past 72 hour(s)).     Discharge condition: stable  Disposition: Hospice  Time spent on discharge: 35 minutes    Electronically signed by Floridalma Bustamante DO on 3/2/2021 at 7:43 AM

## (undated) DEVICE — GAUZE,SPONGE,8"X4",12PLY,XRAY,STRL,LF: Brand: MEDLINE

## (undated) DEVICE — CHLORAPREP 26ML CLEAR

## (undated) DEVICE — CURETTE A13A SIZE 2 T-TIP: Brand: KYPHON® EXPRESS ™ CURETTE

## (undated) DEVICE — BLANKET WRM W29.9XL79.1IN UP BODY FORC AIR MISTRAL-AIR

## (undated) DEVICE — BLANKET WRM W40.2XL55.9IN IORT LO BODY + MISTRAL AIR

## (undated) DEVICE — KIT KEX152EB-CDS- 15/2 FF WITH CDS: Brand: KYPHPAK® FIRST FRACTURE TRAY

## (undated) DEVICE — PACK PROCEDURE SURG SET UP SRMC

## (undated) DEVICE — SOLUTION IV 1000ML LAC RINGERS PH 6.5 INJ USP VIAFLX PLAS

## (undated) DEVICE — MARKER,SKIN,WI/RULER AND LABELS: Brand: MEDLINE

## (undated) DEVICE — HYPODERMIC SAFETY NEEDLE: Brand: MAGELLAN

## (undated) DEVICE — PAD,O.R.,TABLE,CONV FOAM,2X20X72: Brand: MEDLINE

## (undated) DEVICE — STRIP,CLOSURE,WOUND,MEDI-STRIP,1/2X4: Brand: MEDLINE

## (undated) DEVICE — SHEET, T, LAPAROTOMY, STERILE: Brand: MEDLINE

## (undated) DEVICE — CONTAINER,SPECIMEN,PNEU TUBE,4OZ,OR STRL: Brand: MEDLINE

## (undated) DEVICE — GLOVE SURG SZ 65 THK91MIL LTX FREE SYN POLYISOPRENE

## (undated) DEVICE — DRAPE C ARM W36XL30IN RECTANG BND BG AND TAPE

## (undated) DEVICE — SPONGE GZ W4XL4IN COT 12 PLY TYP VII WVN C FLD DSGN

## (undated) DEVICE — BONE BIOPSY DEVICE F07A TAPERED SIZE 2: Brand: MEDTRONIC REUSABLE INSTRUMENTS

## (undated) DEVICE — JCKSON TBL POSTNER NO HD REST: Brand: MEDLINE INDUSTRIES, INC.

## (undated) DEVICE — GOWN,SIRUS,NON REINFRCD,LARGE,SET IN SL: Brand: MEDLINE

## (undated) DEVICE — Z DUP USE 2257490 ADHESIVE SKIN CLSRE 036ML TPCL 2CTL CNCRLTE HIGH VSCSTY DRMB

## (undated) DEVICE — SYRINGE MED 10ML LUERLOCK TIP W/O SFTY DISP

## (undated) DEVICE — GARMENT,MEDLINE,DVT,INT,CALF,MED, GEN2: Brand: MEDLINE

## (undated) DEVICE — Z INACTIVE USE 2662641 SOLUTION IV 1000ML 140MEQ/L SOD 5MEQ/L K 3MEQ/L MG 27MEQ/L

## (undated) DEVICE — DRESSING TRNSPAR W5XL4.5IN FLM SHT SEMIPERMEABLE WIND

## (undated) DEVICE — GOWN,SIRUS,NONRNF,SETINSLV,XL,20/CS: Brand: MEDLINE

## (undated) DEVICE — PAD,NON-ADHERENT,3X8,STERILE,LF,1/PK: Brand: MEDLINE